# Patient Record
Sex: MALE | Race: WHITE | NOT HISPANIC OR LATINO | Employment: OTHER | URBAN - METROPOLITAN AREA
[De-identification: names, ages, dates, MRNs, and addresses within clinical notes are randomized per-mention and may not be internally consistent; named-entity substitution may affect disease eponyms.]

---

## 2018-07-12 ENCOUNTER — RECORDS - HEALTHEAST (OUTPATIENT)
Dept: LAB | Facility: CLINIC | Age: 73
End: 2018-07-12

## 2018-07-12 LAB
ALBUMIN SERPL-MCNC: 4 G/DL (ref 3.5–5)
ALP SERPL-CCNC: 62 U/L (ref 45–120)
ALT SERPL W P-5'-P-CCNC: 26 U/L (ref 0–45)
ANION GAP SERPL CALCULATED.3IONS-SCNC: 10 MMOL/L (ref 5–18)
AST SERPL W P-5'-P-CCNC: 20 U/L (ref 0–40)
BILIRUB SERPL-MCNC: 0.5 MG/DL (ref 0–1)
BUN SERPL-MCNC: 27 MG/DL (ref 8–28)
CALCIUM SERPL-MCNC: 9.2 MG/DL (ref 8.5–10.5)
CHLORIDE BLD-SCNC: 108 MMOL/L (ref 98–107)
CHOLEST SERPL-MCNC: 205 MG/DL
CO2 SERPL-SCNC: 24 MMOL/L (ref 22–31)
CREAT SERPL-MCNC: 0.85 MG/DL (ref 0.7–1.3)
FASTING STATUS PATIENT QL REPORTED: ABNORMAL
GFR SERPL CREATININE-BSD FRML MDRD: >60 ML/MIN/1.73M2
GLUCOSE BLD-MCNC: 94 MG/DL (ref 70–125)
HDLC SERPL-MCNC: 35 MG/DL
LDLC SERPL CALC-MCNC: 150 MG/DL
POTASSIUM BLD-SCNC: 4.3 MMOL/L (ref 3.5–5)
PROT SERPL-MCNC: 6.8 G/DL (ref 6–8)
PSA SERPL-MCNC: 0.7 NG/ML (ref 0–6.5)
SODIUM SERPL-SCNC: 142 MMOL/L (ref 136–145)
TRIGL SERPL-MCNC: 99 MG/DL

## 2018-07-20 ENCOUNTER — RECORDS - HEALTHEAST (OUTPATIENT)
Dept: ADMINISTRATIVE | Facility: OTHER | Age: 73
End: 2018-07-20

## 2018-07-25 ENCOUNTER — HOSPITAL ENCOUNTER (OUTPATIENT)
Dept: MRI IMAGING | Facility: CLINIC | Age: 73
Discharge: HOME | End: 2018-07-25
Attending: OPHTHALMOLOGY

## 2018-07-25 ENCOUNTER — COMMUNICATION - HEALTHEAST (OUTPATIENT)
Dept: TELEHEALTH | Facility: CLINIC | Age: 73
End: 2018-07-25

## 2018-07-25 DIAGNOSIS — H53.2 DIPLOPIA: ICD-10-CM

## 2020-01-03 ENCOUNTER — RECORDS - HEALTHEAST (OUTPATIENT)
Dept: LAB | Facility: CLINIC | Age: 75
End: 2020-01-03

## 2020-01-03 LAB
ANION GAP SERPL CALCULATED.3IONS-SCNC: 10 MMOL/L (ref 5–18)
BNP SERPL-MCNC: 33 PG/ML (ref 0–76)
BUN SERPL-MCNC: 18 MG/DL (ref 8–28)
CALCIUM SERPL-MCNC: 9.2 MG/DL (ref 8.5–10.5)
CHLORIDE BLD-SCNC: 104 MMOL/L (ref 98–107)
CO2 SERPL-SCNC: 26 MMOL/L (ref 22–31)
CREAT SERPL-MCNC: 0.85 MG/DL (ref 0.7–1.3)
GFR SERPL CREATININE-BSD FRML MDRD: >60 ML/MIN/1.73M2
GLUCOSE BLD-MCNC: 114 MG/DL (ref 70–125)
POTASSIUM BLD-SCNC: 3.6 MMOL/L (ref 3.5–5)
SODIUM SERPL-SCNC: 140 MMOL/L (ref 136–145)

## 2020-01-15 ENCOUNTER — AMBULATORY - HEALTHEAST (OUTPATIENT)
Dept: CARDIOLOGY | Facility: CLINIC | Age: 75
End: 2020-01-15

## 2020-01-15 ENCOUNTER — RECORDS - HEALTHEAST (OUTPATIENT)
Dept: ADMINISTRATIVE | Facility: OTHER | Age: 75
End: 2020-01-15

## 2020-01-15 ENCOUNTER — OFFICE VISIT - HEALTHEAST (OUTPATIENT)
Dept: CARDIOLOGY | Facility: CLINIC | Age: 75
End: 2020-01-15

## 2020-01-15 DIAGNOSIS — R60.9 EDEMA, UNSPECIFIED TYPE: ICD-10-CM

## 2020-01-15 DIAGNOSIS — I10 ESSENTIAL HYPERTENSION: ICD-10-CM

## 2020-01-15 DIAGNOSIS — I35.0 NONRHEUMATIC AORTIC VALVE STENOSIS: ICD-10-CM

## 2020-01-15 RX ORDER — LISINOPRIL 20 MG/1
2 TABLET ORAL DAILY
Status: SHIPPED | COMMUNITY
Start: 2019-11-12 | End: 2022-02-10

## 2020-01-15 RX ORDER — IBUPROFEN 200 MG
600 TABLET ORAL EVERY 6 HOURS PRN
Status: SHIPPED | COMMUNITY
Start: 2020-01-15 | End: 2021-12-08

## 2020-01-15 ASSESSMENT — MIFFLIN-ST. JEOR: SCORE: 1745.51

## 2020-01-16 ENCOUNTER — AMBULATORY - HEALTHEAST (OUTPATIENT)
Dept: CARDIOLOGY | Facility: CLINIC | Age: 75
End: 2020-01-16

## 2020-01-27 ENCOUNTER — COMMUNICATION - HEALTHEAST (OUTPATIENT)
Dept: CARDIOLOGY | Facility: CLINIC | Age: 75
End: 2020-01-27

## 2020-06-25 ENCOUNTER — RECORDS - HEALTHEAST (OUTPATIENT)
Dept: LAB | Facility: CLINIC | Age: 75
End: 2020-06-25

## 2020-06-25 LAB
ALBUMIN SERPL-MCNC: 4.3 G/DL (ref 3.5–5)
ALP SERPL-CCNC: 67 U/L (ref 45–120)
ALT SERPL W P-5'-P-CCNC: 31 U/L (ref 0–45)
ANION GAP SERPL CALCULATED.3IONS-SCNC: 12 MMOL/L (ref 5–18)
AST SERPL W P-5'-P-CCNC: 22 U/L (ref 0–40)
BILIRUB SERPL-MCNC: 0.5 MG/DL (ref 0–1)
BUN SERPL-MCNC: 19 MG/DL (ref 8–28)
CALCIUM SERPL-MCNC: 9.4 MG/DL (ref 8.5–10.5)
CHLORIDE BLD-SCNC: 102 MMOL/L (ref 98–107)
CHOLEST SERPL-MCNC: 251 MG/DL
CO2 SERPL-SCNC: 25 MMOL/L (ref 22–31)
CREAT SERPL-MCNC: 0.97 MG/DL (ref 0.7–1.3)
FASTING STATUS PATIENT QL REPORTED: ABNORMAL
GFR SERPL CREATININE-BSD FRML MDRD: >60 ML/MIN/1.73M2
GLUCOSE BLD-MCNC: 87 MG/DL (ref 70–125)
HDLC SERPL-MCNC: 33 MG/DL
LDLC SERPL CALC-MCNC: 159 MG/DL
POTASSIUM BLD-SCNC: 4.2 MMOL/L (ref 3.5–5)
PROT SERPL-MCNC: 7.3 G/DL (ref 6–8)
PSA SERPL-MCNC: 0.8 NG/ML (ref 0–6.5)
SODIUM SERPL-SCNC: 139 MMOL/L (ref 136–145)
TRIGL SERPL-MCNC: 293 MG/DL

## 2020-08-28 ENCOUNTER — RECORDS - HEALTHEAST (OUTPATIENT)
Dept: LAB | Facility: CLINIC | Age: 75
End: 2020-08-28

## 2020-08-28 LAB — VIT B12 SERPL-MCNC: 372 PG/ML (ref 213–816)

## 2021-04-05 ENCOUNTER — RECORDS - HEALTHEAST (OUTPATIENT)
Dept: LAB | Facility: CLINIC | Age: 76
End: 2021-04-05

## 2021-04-05 LAB
ALBUMIN SERPL-MCNC: 3.9 G/DL (ref 3.5–5)
ALP SERPL-CCNC: 66 U/L (ref 45–120)
ALT SERPL W P-5'-P-CCNC: 30 U/L (ref 0–45)
ANION GAP SERPL CALCULATED.3IONS-SCNC: 11 MMOL/L (ref 5–18)
AST SERPL W P-5'-P-CCNC: 20 U/L (ref 0–40)
BILIRUB SERPL-MCNC: 0.4 MG/DL (ref 0–1)
BUN SERPL-MCNC: 19 MG/DL (ref 8–28)
CALCIUM SERPL-MCNC: 8.9 MG/DL (ref 8.5–10.5)
CHLORIDE BLD-SCNC: 104 MMOL/L (ref 98–107)
CHOLEST SERPL-MCNC: 202 MG/DL
CO2 SERPL-SCNC: 25 MMOL/L (ref 22–31)
CREAT SERPL-MCNC: 0.82 MG/DL (ref 0.7–1.3)
FASTING STATUS PATIENT QL REPORTED: NO
GFR SERPL CREATININE-BSD FRML MDRD: >60 ML/MIN/1.73M2
GLUCOSE BLD-MCNC: 91 MG/DL (ref 70–125)
HDLC SERPL-MCNC: 29 MG/DL
LDLC SERPL CALC-MCNC: 132 MG/DL
POTASSIUM BLD-SCNC: 4.5 MMOL/L (ref 3.5–5)
PROT SERPL-MCNC: 7 G/DL (ref 6–8)
SODIUM SERPL-SCNC: 140 MMOL/L (ref 136–145)
TRIGL SERPL-MCNC: 207 MG/DL

## 2021-05-02 ENCOUNTER — HEALTH MAINTENANCE LETTER (OUTPATIENT)
Age: 76
End: 2021-05-02

## 2021-06-02 ENCOUNTER — RECORDS - HEALTHEAST (OUTPATIENT)
Dept: ADMINISTRATIVE | Facility: CLINIC | Age: 76
End: 2021-06-02

## 2021-06-04 VITALS
HEART RATE: 60 BPM | RESPIRATION RATE: 16 BRPM | WEIGHT: 226 LBS | SYSTOLIC BLOOD PRESSURE: 146 MMHG | HEIGHT: 69 IN | BODY MASS INDEX: 33.47 KG/M2 | DIASTOLIC BLOOD PRESSURE: 76 MMHG

## 2021-06-05 NOTE — TELEPHONE ENCOUNTER
----- Message from Katie Elizabeth MD sent at 1/27/2020  7:41 AM CST -----  Teodoro Maldonado,  Reviewed echocardiogram report.  Suggest mild to perhaps mild-moderate aortic stenosis.  Will require continued intermittent monitoring.  Would simply have patient follow-up with myself in approximately 2 months in part to reassess lower extremity edema. Thanks!  ----- Message -----  From: Emily Magana  Sent: 1/16/2020   3:20 PM CST  To: Katie Elizabeth MD

## 2021-06-05 NOTE — TELEPHONE ENCOUNTER
Attempted calling patient - no answer and no VM. Will attempt at a later time.  Message sent to scheduling to call patient for 2 month f/u.

## 2021-06-10 ENCOUNTER — RECORDS - HEALTHEAST (OUTPATIENT)
Dept: LAB | Facility: CLINIC | Age: 76
End: 2021-06-10

## 2021-06-11 LAB — VZV IGG SER QL IA: POSITIVE

## 2021-06-28 NOTE — PROGRESS NOTES
Progress Notes by Katie Elizabeth MD at 1/15/2020  1:50 PM     Author: Katie Elizabeth MD Service: -- Author Type: Physician    Filed: 1/15/2020  2:12 PM Encounter Date: 1/15/2020 Status: Signed    : Katie Elizabeth MD (Physician)                   Thank you Dr. Flores for asking the [facility] team to participate in the care of your patient, Nick Zaldivar.     Impression and Plan     1.  Systolic murmur.  Systolic murmur suggest aortic stenosis.  This may be significant.  Specifically, murmur is mid to late peaking.  Carotid pulses do seem somewhat delayed.  He had an echocardiogram performed just yesterday at outside facility though results are not available.    Plan to obtain echocardiogram results from yesterday, 14 January 2020.    2.  Lower extremity edema.  This has improved with discontinuation of amlodipine as well as increase in furosemide.  He still has some residual lower extremity edema.  On interview, I get the sense that he could work better at limiting his sodium.  At this time, simply recommended that he continue with the current medical regimen as it now stands and focus on trying to maintain sodium diet of less than 2000 mg.  I also recommended some compressive stockings as well.    Ultimately, further recommendations pending echocardiographic findings.         History of Present Illness    Once again I would like to thank you again for asking me to participate in the care of your patient, Nick Zaldivar.  As you know, but to reiterate for my own records, Nick Zaldivar is a 74 y.o. male with who had noticed some increasing lower extremity edema.  He had followed up with primary provider and had discontinuation of amlodipine and increase in his furosemide.  This he states has helped over the last week after making this change.  Still with some lower extremity edema, however.  He denies chest pain or significant worsening of shortness of breath.  He  "has had a weight gain recently of approximately 12 pounds though this he states may not just be from his swelling.  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  His daughter gave him approximately 100 pounds of candy bars and M&Ms and similar candy items.  He states that he has had some indiscretion in this regard.    Further review of systems is otherwise negative/noncontributory (medical record and 13 point review of systems reviewed as well and pertinent positives noted).         Cardiac Diagnostics     Twelve-lead ECG (personally reviewed) 2020: Sinus rhythm with PACs.  First-degree block.  Heart rate in the 50s.         Physical Examination       /76 (Patient Site: Left Arm, Patient Position: Sitting, Cuff Size: Adult Regular)   Pulse 60   Resp 16   Ht 5' 9\" (1.753 m)   Wt (!) 226 lb (102.5 kg)   BMI 33.37 kg/m          Wt Readings from Last 3 Encounters:   01/15/20 (!) 226 lb (102.5 kg)       The patient is alert and oriented times three. Sclerae are anicteric. Mucosal membranes are moist. Jugular venous pressure is normal. No significant adenopathy/thyromegally appreciated. Lungs are clear with good expansion. On cardiovascular exam, the patient has a regular S1 and S2.  There is a 3/6 systolic murmur heard at right sternal border.  Murmur radiates toward the carotids.  Carotid pulses do seem somewhat delayed.  Abdomen is soft and non-tender. Extremities reveal no clubbing, cyanosis, or edema.       Family History/Social History/Risk Factors   Patient does not smoke.  Father had a history of CVA.  Mother had a history of coronary disease and had bypass surgery.  She  at age 89.  He is retired.  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  In his spare time he enjoys going to the [employer].       Medications  Allergies   Current Outpatient Medications   Medication Sig Dispense " Refill   ? aspirin 81 MG EC tablet Take 81 mg by mouth daily.     ? ibuprofen (ADVIL,MOTRIN) 200 MG tablet Take 600 mg by mouth every 6 (six) hours as needed for pain.     ? lisinopril (PRINIVIL,ZESTRIL) 20 MG tablet Take 2 tablets by mouth daily.       No current facility-administered medications for this visit.       No Known Allergies       Lab Results   Lab Results   Component Value Date     01/03/2020    K 3.6 01/03/2020     01/03/2020    CO2 26 01/03/2020    BUN 18 01/03/2020    CREATININE 0.85 01/03/2020    CALCIUM 9.2 01/03/2020     Lab Results   Component Value Date    CHOL 205 (H) 07/12/2018    TRIG 99 07/12/2018    HDL 35 (L) 07/12/2018    LDLCALC 150 (H) 07/12/2018     Lab Results   Component Value Date    BNP 33 01/03/2020           Medical History  Surgical History   Hypertension  Lower extremity edema    History of pilonidal cyst surgery

## 2021-09-20 ENCOUNTER — TRANSCRIBE ORDERS (OUTPATIENT)
Dept: OTHER | Age: 76
End: 2021-09-20

## 2021-09-20 DIAGNOSIS — I21.3 ST ELEVATION MYOCARDIAL INFARCTION (STEMI), UNSPECIFIED ARTERY (H): Primary | ICD-10-CM

## 2021-09-22 ENCOUNTER — TRANSCRIBE ORDERS (OUTPATIENT)
Dept: OTHER | Age: 76
End: 2021-09-22

## 2021-10-04 ENCOUNTER — TRANSCRIBE ORDERS (OUTPATIENT)
Dept: OTHER | Age: 76
End: 2021-10-04

## 2021-10-05 ENCOUNTER — HOSPITAL ENCOUNTER (OUTPATIENT)
Dept: CARDIAC REHAB | Facility: CLINIC | Age: 76
End: 2021-10-05
Attending: INTERNAL MEDICINE
Payer: MEDICARE

## 2021-10-05 PROCEDURE — 93797 PHYS/QHP OP CAR RHAB WO ECG: CPT

## 2021-10-05 PROCEDURE — 93798 PHYS/QHP OP CAR RHAB W/ECG: CPT

## 2021-10-08 ENCOUNTER — HOSPITAL ENCOUNTER (OUTPATIENT)
Dept: CARDIAC REHAB | Facility: CLINIC | Age: 76
End: 2021-10-08
Attending: INTERNAL MEDICINE
Payer: MEDICARE

## 2021-10-08 PROCEDURE — 93798 PHYS/QHP OP CAR RHAB W/ECG: CPT

## 2021-10-12 ENCOUNTER — HOSPITAL ENCOUNTER (OUTPATIENT)
Dept: CARDIAC REHAB | Facility: CLINIC | Age: 76
End: 2021-10-12
Attending: INTERNAL MEDICINE
Payer: MEDICARE

## 2021-10-12 PROCEDURE — 93798 PHYS/QHP OP CAR RHAB W/ECG: CPT

## 2021-10-15 ENCOUNTER — HOSPITAL ENCOUNTER (OUTPATIENT)
Dept: CARDIAC REHAB | Facility: CLINIC | Age: 76
End: 2021-10-15
Attending: INTERNAL MEDICINE
Payer: MEDICARE

## 2021-10-15 PROCEDURE — 93798 PHYS/QHP OP CAR RHAB W/ECG: CPT

## 2021-10-19 ENCOUNTER — HOSPITAL ENCOUNTER (OUTPATIENT)
Dept: CARDIAC REHAB | Facility: CLINIC | Age: 76
End: 2021-10-19
Attending: INTERNAL MEDICINE
Payer: MEDICARE

## 2021-10-19 PROCEDURE — 93798 PHYS/QHP OP CAR RHAB W/ECG: CPT | Performed by: OCCUPATIONAL THERAPIST

## 2021-10-22 ENCOUNTER — HOSPITAL ENCOUNTER (OUTPATIENT)
Dept: CARDIAC REHAB | Facility: CLINIC | Age: 76
End: 2021-10-22
Attending: INTERNAL MEDICINE
Payer: MEDICARE

## 2021-10-22 PROCEDURE — 93798 PHYS/QHP OP CAR RHAB W/ECG: CPT

## 2021-10-26 ENCOUNTER — HOSPITAL ENCOUNTER (OUTPATIENT)
Dept: CARDIAC REHAB | Facility: CLINIC | Age: 76
End: 2021-10-26
Attending: INTERNAL MEDICINE
Payer: MEDICARE

## 2021-10-26 PROCEDURE — 93798 PHYS/QHP OP CAR RHAB W/ECG: CPT

## 2021-10-29 ENCOUNTER — HOSPITAL ENCOUNTER (OUTPATIENT)
Dept: CARDIAC REHAB | Facility: CLINIC | Age: 76
End: 2021-10-29
Attending: INTERNAL MEDICINE
Payer: MEDICARE

## 2021-10-29 PROCEDURE — 93798 PHYS/QHP OP CAR RHAB W/ECG: CPT

## 2021-11-05 ENCOUNTER — HOSPITAL ENCOUNTER (OUTPATIENT)
Dept: CARDIAC REHAB | Facility: CLINIC | Age: 76
End: 2021-11-05
Attending: INTERNAL MEDICINE
Payer: MEDICARE

## 2021-11-05 PROCEDURE — 93798 PHYS/QHP OP CAR RHAB W/ECG: CPT

## 2021-11-09 ENCOUNTER — HOSPITAL ENCOUNTER (OUTPATIENT)
Dept: CARDIAC REHAB | Facility: CLINIC | Age: 76
End: 2021-11-09
Attending: INTERNAL MEDICINE
Payer: MEDICARE

## 2021-11-09 PROCEDURE — 93798 PHYS/QHP OP CAR RHAB W/ECG: CPT

## 2021-11-12 ENCOUNTER — HOSPITAL ENCOUNTER (OUTPATIENT)
Dept: CARDIAC REHAB | Facility: CLINIC | Age: 76
End: 2021-11-12
Attending: INTERNAL MEDICINE
Payer: MEDICARE

## 2021-11-12 PROCEDURE — 93798 PHYS/QHP OP CAR RHAB W/ECG: CPT

## 2021-11-16 ENCOUNTER — HOSPITAL ENCOUNTER (OUTPATIENT)
Dept: CARDIAC REHAB | Facility: CLINIC | Age: 76
End: 2021-11-16
Attending: INTERNAL MEDICINE
Payer: MEDICARE

## 2021-11-16 PROCEDURE — 93798 PHYS/QHP OP CAR RHAB W/ECG: CPT

## 2021-11-19 ENCOUNTER — HOSPITAL ENCOUNTER (OUTPATIENT)
Dept: CARDIAC REHAB | Facility: CLINIC | Age: 76
End: 2021-11-19
Attending: INTERNAL MEDICINE
Payer: MEDICARE

## 2021-11-19 PROCEDURE — 93798 PHYS/QHP OP CAR RHAB W/ECG: CPT

## 2021-11-23 ENCOUNTER — HOSPITAL ENCOUNTER (OUTPATIENT)
Dept: CARDIAC REHAB | Facility: CLINIC | Age: 76
End: 2021-11-23
Attending: INTERNAL MEDICINE
Payer: MEDICARE

## 2021-11-23 PROCEDURE — 93798 PHYS/QHP OP CAR RHAB W/ECG: CPT

## 2021-11-26 ENCOUNTER — HOSPITAL ENCOUNTER (OUTPATIENT)
Dept: CARDIAC REHAB | Facility: CLINIC | Age: 76
End: 2021-11-26
Attending: INTERNAL MEDICINE
Payer: MEDICARE

## 2021-11-26 PROCEDURE — 93798 PHYS/QHP OP CAR RHAB W/ECG: CPT

## 2021-11-30 ENCOUNTER — HOSPITAL ENCOUNTER (OUTPATIENT)
Dept: CARDIAC REHAB | Facility: CLINIC | Age: 76
End: 2021-11-30
Attending: INTERNAL MEDICINE
Payer: MEDICARE

## 2021-11-30 PROCEDURE — 93798 PHYS/QHP OP CAR RHAB W/ECG: CPT

## 2021-12-03 ENCOUNTER — HOSPITAL ENCOUNTER (OUTPATIENT)
Dept: CARDIAC REHAB | Facility: CLINIC | Age: 76
End: 2021-12-03
Attending: INTERNAL MEDICINE
Payer: MEDICARE

## 2021-12-03 PROCEDURE — 93798 PHYS/QHP OP CAR RHAB W/ECG: CPT

## 2021-12-07 ENCOUNTER — HOSPITAL ENCOUNTER (OUTPATIENT)
Dept: CARDIAC REHAB | Facility: CLINIC | Age: 76
End: 2021-12-07
Attending: INTERNAL MEDICINE
Payer: MEDICARE

## 2021-12-07 PROCEDURE — 93798 PHYS/QHP OP CAR RHAB W/ECG: CPT

## 2021-12-08 ENCOUNTER — OFFICE VISIT (OUTPATIENT)
Dept: CARDIOLOGY | Facility: CLINIC | Age: 76
End: 2021-12-08
Payer: MEDICARE

## 2021-12-08 VITALS
RESPIRATION RATE: 24 BRPM | HEART RATE: 59 BPM | SYSTOLIC BLOOD PRESSURE: 110 MMHG | BODY MASS INDEX: 32.84 KG/M2 | WEIGHT: 221.7 LBS | DIASTOLIC BLOOD PRESSURE: 60 MMHG | HEIGHT: 69 IN

## 2021-12-08 DIAGNOSIS — I25.83 CORONARY ARTERY DISEASE DUE TO LIPID RICH PLAQUE: Primary | ICD-10-CM

## 2021-12-08 DIAGNOSIS — I25.10 CORONARY ARTERY DISEASE DUE TO LIPID RICH PLAQUE: Primary | ICD-10-CM

## 2021-12-08 DIAGNOSIS — I10 PRIMARY HYPERTENSION: ICD-10-CM

## 2021-12-08 DIAGNOSIS — E66.813 CLASS 3 SEVERE OBESITY DUE TO EXCESS CALORIES WITHOUT SERIOUS COMORBIDITY IN ADULT, UNSPECIFIED BMI (H): ICD-10-CM

## 2021-12-08 DIAGNOSIS — I06.0 RHEUMATIC AORTIC STENOSIS: ICD-10-CM

## 2021-12-08 DIAGNOSIS — E78.00 PURE HYPERCHOLESTEROLEMIA: ICD-10-CM

## 2021-12-08 DIAGNOSIS — E66.01 CLASS 3 SEVERE OBESITY DUE TO EXCESS CALORIES WITHOUT SERIOUS COMORBIDITY IN ADULT, UNSPECIFIED BMI (H): ICD-10-CM

## 2021-12-08 PROBLEM — E66.9 OBESITY: Status: ACTIVE | Noted: 2021-12-08

## 2021-12-08 PROCEDURE — 99204 OFFICE O/P NEW MOD 45 MIN: CPT | Performed by: INTERNAL MEDICINE

## 2021-12-08 RX ORDER — ACETAMINOPHEN 325 MG/1
650 TABLET ORAL PRN
Status: ON HOLD | COMMUNITY
Start: 2020-04-23 | End: 2024-01-22

## 2021-12-08 RX ORDER — EPINEPHRINE 0.3 MG/.3ML
0.3 INJECTION SUBCUTANEOUS PRN
COMMUNITY
Start: 2020-04-23 | End: 2022-02-10

## 2021-12-08 RX ORDER — ROSUVASTATIN CALCIUM 40 MG/1
1 TABLET, COATED ORAL DAILY
COMMUNITY
Start: 2021-09-18 | End: 2021-12-09

## 2021-12-08 RX ORDER — FUROSEMIDE 40 MG
40 TABLET ORAL DAILY
Status: ON HOLD | COMMUNITY
Start: 2018-11-01 | End: 2024-01-22

## 2021-12-08 RX ORDER — METOPROLOL TARTRATE 25 MG/1
25 TABLET, FILM COATED ORAL DAILY
COMMUNITY
Start: 2021-09-18 | End: 2021-12-09

## 2021-12-08 RX ORDER — NITROGLYCERIN 0.4 MG/1
TABLET SUBLINGUAL SEE ADMIN INSTRUCTIONS
COMMUNITY
Start: 2021-09-18

## 2021-12-08 ASSESSMENT — MIFFLIN-ST. JEOR: SCORE: 1726

## 2021-12-08 NOTE — PROGRESS NOTES
[facility] Heart Care Office Consult     Assessment:   (I25.10,  I25.83) Coronary artery disease due to lipid rich plaque  (primary encounter diagnosis)  Comment: Angiography September 2021 due to ST segment elevation inferior MI showed normal left main, 50% mid LAD, 50% mid circumflex, and 95% distal right coronary artery involving the PDA posterior lateral branches.  He received drug-coated stent.  Getting along well without symptoms and work on prevention.    (I10) Primary hypertension  Comment: Under good control on lisinopril and metoprolol.    (E78.00) Pure hypercholesterolemia  Comment: Total cholesterol 112 with an LDL 52 which is excellent on rosuvastatin.    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  We will check echo in March 2022, 6 months out from his echo at [facility].    (E66.01) Class 3 severe obesity due to excess calories without serious comorbidity in adult, unspecified BMI (H)  Comment: Work on weight loss.     Plan:   1.  Try to go to furosemide every other day only as needed.  2.  Increase activity at cardiac rehab to about 5 to 7 minutes.  3.  Work on weight loss.  4.  Follow-up echo for aortic stenosis in March 2022.  5.  Follow-up with me in about September 2022, a year out from his heart attack.    History of Present Illness:   Thank you for asking the [facility] Heart Care team to see Nick Zaldivar a 75 year old male  in consultation  to evaluate coronary artery disease.   Patient was in his usual state of health till around September 2021 when he presented to [facility] with chest discomfort.  He was noted have ST segment elevation involving his inferior leads and underwent subsequent angiography finding out 95% lesion involving the PDA posterior lateral branch of the right coronary artery.  He received a drug-coated stents at that time.  He is doing cardiac rehab here at [facility],  lives independently with his wife, walking on the treadmill and the basement at home.  He complains of back discomfort, as well as bilateral foot neuropathy and otherwise no syncope, dizziness, fatigue, chest discomfort, palpitations, significant shortness of breath, PND, orthopnea.    Past Medical History:   Benign essential hypertension  Pure hypercholesterolemia  Coronary artery disease  Lumbar spondylosis  Peripheral neuropathy  Rheumatic aortic stenosis    Past history is negative for cancer, tuberculosis, diabetes mellitus, cerebrovascular accident, chronic kidney disease, peptic ulcer disease, chronic obstructive pulmonary disease, or thyroid disorder.    Past Surgical History:   No past surgical history on file.    Family History:   Family history positive for coronary artery bypass grafting in his mother in her 80s with pacemaker implant.    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 He has never used smokeless tobacco. He reports that he does not use drugs.  He drinks maybe 1 [employer] cream every other month. The primary care physician is Randy Rodriguez.    Meds:   Scheduled Meds:  Current Outpatient Medications   Medication Sig Dispense Refill     acetaminophen (TYLENOL) 325 MG tablet Take 650 mg by mouth as needed       aspirin 81 MG EC tablet [ASPIRIN 81 MG EC TABLET] Take 81 mg by mouth daily.       EPINEPHrine (ANY BX GENERIC EQUIV) 0.3 MG/0.3ML injection 2-pack Inject 0.3 mg into the muscle as needed       furosemide (LASIX) 40 MG tablet Take 40 mg by mouth daily       lisinopril (PRINIVIL,ZESTRIL) 20 MG tablet [LISINOPRIL (PRINIVIL,ZESTRIL) 20 MG TABLET] Take 2 tablets by mouth daily.       metoprolol tartrate (LOPRESSOR) 25 MG tablet Take 25 mg by mouth daily       nitroGLYcerin (NITROSTAT) 0.4 MG sublingual tablet See Admin Instructions        "rosuvastatin (CRESTOR) 40 MG tablet Take 1 tablet by mouth daily       ticagrelor (BRILINTA) 90 MG tablet Take 90 mg by mouth 2 times daily         PRN Meds:.    Allergies:   Patient has no known allergies.    Objective:      Physical Exam  100.6 kg (221 lb 11.2 oz)  1.753 m (5' 9\")  Body mass index is 32.74 kg/m .  /60 (BP Location: Left arm, Patient Position: Sitting, Cuff Size: Adult Large)   Pulse 59   Resp 24   Ht 1.753 m (5' 9\")   Wt 100.6 kg (221 lb 11.2 oz)   BMI 32.74 kg/m      General Appearance:   Alert, cooperative and in no acute distress.   HEENT:  No scleral icterus; the mucous membranes were pink and moist.   Neck: JVP normal. No thyromegaly. No HJR   Chest: The spine was straight. The chest was symmetric.   Lungs:   Respirations unlabored; the lungs are clear to auscultation.   Cardiovascular:   S1 and S2 with 2/6 systolic ejection murmur, clicks or rubs. Brachial, radial, carotid and posterior tibial pulses are intact and symetrical.  No carotid bruits noted   Abdomen:  No organomegaly, masses, bruits, or tenderness. Bowels sounds are present   Extremities: No cyanosis, clubbing, or edema.   Skin: No xanthelasma.   Neurologic: Mood and affect are appropriate.         Lab Reviewed Personally by myself  Lab Results   Component Value Date     04/05/2021    CO2 25 04/05/2021    BUN 19 04/05/2021     Lab Results   Component Value Date    WBC 6.1 03/26/2020    HGB 14.7 03/26/2020    HCT 43.4 03/26/2020    MCV 90 03/26/2020     03/26/2020     Lab Results   Component Value Date    CHOL 202 04/05/2021    TRIG 207 04/05/2021    HDL 29 04/05/2021     Lab Results   Component Value Date    BNP 33 01/03/2020       ECG personally reviewed by myself shows sinus rhythm, within normal limits.     Review of Systems:     Review of Systems:   Enc Vitals  BP: 110/60  Pulse: 59  Resp: 24  Weight: 100.6 kg (221 lb 11.2 oz)  Height: 175.3 cm (5' 9\")                                          "

## 2021-12-08 NOTE — PATIENT INSTRUCTIONS
Mr. Nick Zaldivar,  It certainly was nice to meet you today.  Per our conversation you had a heart attack and got a stent and still have a heart valve issue. This is the reason I am checking the ultrasound of the heart around March.  We will call you the results of these tests.  In the interim continue the cardiac rehab and show them this letter to increase to 5-7 mets as tolerated. Consider the diuretic every other day or only when getting puffy legs.  I will plan on seeing you a year out from the heart attack.  Jas Hope

## 2021-12-08 NOTE — LETTER
12/8/2021    Randy Rodriguez MD  [facility] [address]  [address]  [place] [id number]    RE: Nick Zaldivar       Dear Colleague,     I had the pleasure of seeing Nick Zaldivar in the [facility].    [facility] Care Office Consult     Assessment:   (I25.10,  I25.83) Coronary artery disease due to lipid rich plaque  (primary encounter diagnosis)  Comment: Angiography September 2021 due to ST segment elevation inferior MI showed normal left main, 50% mid LAD, 50% mid circumflex, and 95% distal right coronary artery involving the PDA posterior lateral branches.  He received drug-coated stent.  Getting along well without symptoms and work on prevention.    (I10) Primary hypertension  Comment: Under good control on lisinopril and metoprolol.    (E78.00) Pure hypercholesterolemia  Comment: Total cholesterol 112 with an LDL 52 which is excellent on rosuvastatin.    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  We will check echo in March 2022, 6 months out from his echo at [facility].    (E66.01) Class 3 severe obesity due to excess calories without serious comorbidity in adult, unspecified BMI (H)  Comment: Work on weight loss.     Plan:   1.  Try to go to furosemide every other day only as needed.  2.  Increase activity at cardiac rehab to about 5 to 7 minutes.  3.  Work on weight loss.  4.  Follow-up echo for aortic stenosis in March 2022.  5.  Follow-up with me in about September 2022, a year out from his heart attack.    History of Present Illness:   Thank you for asking the [facility] Care team to see Nick Zaldivar a 75 year old male  in consultation  to evaluate coronary artery disease.   Patient was in his usual state of health till around September 2021 when he presented to [facility] with chest discomfort.  He was noted have ST segment elevation involving his inferior  leads and underwent subsequent angiography finding out 95% lesion involving the PDA posterior lateral branch of the right coronary artery.  He received a drug-coated stents at that time.  He is doing cardiac rehab here at [facility], lives independently with his wife, walking on the treadmill and the basement at home.  He complains of back discomfort, as well as bilateral foot neuropathy and otherwise no syncope, dizziness, fatigue, chest discomfort, palpitations, significant shortness of breath, PND, orthopnea.    Past Medical History:   Benign essential hypertension  Pure hypercholesterolemia  Coronary artery disease  Lumbar spondylosis  Peripheral neuropathy  Rheumatic aortic stenosis    Past history is negative for cancer, tuberculosis, diabetes mellitus, cerebrovascular accident, chronic kidney disease, peptic ulcer disease, chronic obstructive pulmonary disease, or thyroid disorder.    Past Surgical History:   No past surgical history on file.    Family History:   Family history positive for coronary artery bypass grafting in his mother in her 80s with pacemaker implant.    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 He has never used smokeless tobacco. He reports that he does not use drugs.  He drinks maybe 1 [employer] cream every other month. The primary care physician is Randy Rodriguez    Meds:   Scheduled Meds:  Current Outpatient Medications   Medication Sig Dispense Refill     acetaminophen (TYLENOL) 325 MG tablet Take 650 mg by mouth as needed       aspirin 81 MG EC tablet [ASPIRIN 81 MG EC TABLET] Take 81 mg by mouth daily.       EPINEPHrine (ANY BX GENERIC EQUIV) 0.3 MG/0.3ML injection 2-pack Inject 0.3 mg into the muscle as needed       furosemide (LASIX) 40 MG tablet Take 40 mg by mouth daily       lisinopril (PRINIVIL,ZESTRIL) 20 MG tablet  "[LISINOPRIL (PRINIVIL,ZESTRIL) 20 MG TABLET] Take 2 tablets by mouth daily.       metoprolol tartrate (LOPRESSOR) 25 MG tablet Take 25 mg by mouth daily       nitroGLYcerin (NITROSTAT) 0.4 MG sublingual tablet See Admin Instructions       rosuvastatin (CRESTOR) 40 MG tablet Take 1 tablet by mouth daily       ticagrelor (BRILINTA) 90 MG tablet Take 90 mg by mouth 2 times daily         PRN Meds:.    Allergies:   Patient has no known allergies.    Objective:      Physical Exam  100.6 kg (221 lb 11.2 oz)  1.753 m (5' 9\")  Body mass index is 32.74 kg/m .  /60 (BP Location: Left arm, Patient Position: Sitting, Cuff Size: Adult Large)   Pulse 59   Resp 24   Ht 1.753 m (5' 9\")   Wt 100.6 kg (221 lb 11.2 oz)   BMI 32.74 kg/m      General Appearance:   Alert, cooperative and in no acute distress.   HEENT:  No scleral icterus; the mucous membranes were pink and moist.   Neck: JVP normal. No thyromegaly. No HJR   Chest: The spine was straight. The chest was symmetric.   Lungs:   Respirations unlabored; the lungs are clear to auscultation.   Cardiovascular:   S1 and S2 with 2/6 systolic ejection murmur, clicks or rubs. Brachial, radial, carotid and posterior tibial pulses are intact and symetrical.  No carotid bruits noted   Abdomen:  No organomegaly, masses, bruits, or tenderness. Bowels sounds are present   Extremities: No cyanosis, clubbing, or edema.   Skin: No xanthelasma.   Neurologic: Mood and affect are appropriate.         Lab Reviewed Personally by myself  Lab Results   Component Value Date     04/05/2021    CO2 25 04/05/2021    BUN 19 04/05/2021     Lab Results   Component Value Date    WBC 6.1 03/26/2020    HGB 14.7 03/26/2020    HCT 43.4 03/26/2020    MCV 90 03/26/2020     03/26/2020     Lab Results   Component Value Date    CHOL 202 04/05/2021    TRIG 207 04/05/2021    HDL 29 04/05/2021     Lab Results   Component Value Date    BNP 33 01/03/2020       ECG personally reviewed by myself shows " "sinus rhythm, within normal limits.     Review of Systems:     Review of Systems:   Enc Vitals  BP: 110/60  Pulse: 59  Resp: 24  Weight: 100.6 kg (221 lb 11.2 oz)  Height: 175.3 cm (5' 9\")              "

## 2021-12-09 ENCOUNTER — MYC MEDICAL ADVICE (OUTPATIENT)
Dept: CARDIOLOGY | Facility: CLINIC | Age: 76
End: 2021-12-09
Payer: MEDICARE

## 2021-12-09 DIAGNOSIS — I25.83 CORONARY ARTERY DISEASE DUE TO LIPID RICH PLAQUE: ICD-10-CM

## 2021-12-09 DIAGNOSIS — I10 ESSENTIAL HYPERTENSION: ICD-10-CM

## 2021-12-09 DIAGNOSIS — E78.00 PURE HYPERCHOLESTEROLEMIA: Primary | ICD-10-CM

## 2021-12-09 DIAGNOSIS — I25.10 CORONARY ARTERY DISEASE DUE TO LIPID RICH PLAQUE: ICD-10-CM

## 2021-12-09 RX ORDER — ROSUVASTATIN CALCIUM 40 MG/1
40 TABLET, COATED ORAL DAILY
Qty: 90 TABLET | Refills: 3 | Status: SHIPPED | OUTPATIENT
Start: 2021-12-09 | End: 2022-12-08

## 2021-12-09 RX ORDER — METOPROLOL TARTRATE 25 MG/1
25 TABLET, FILM COATED ORAL 2 TIMES DAILY
Qty: 180 TABLET | Refills: 3 | Status: SHIPPED | OUTPATIENT
Start: 2021-12-09 | End: 2022-07-29

## 2021-12-09 NOTE — TELEPHONE ENCOUNTER
Called patient and discussed mychart request. He confirmed that his Metoprolol 25 mg is BID not daily; LBF discussed medications during visit yesterday. Pt is switching care to him. Rx's refilled to his mail order pharmacy and he was updated. He thanks BRIAN. -[facility] [place]

## 2021-12-10 ENCOUNTER — HOSPITAL ENCOUNTER (OUTPATIENT)
Dept: CARDIAC REHAB | Facility: CLINIC | Age: 76
End: 2021-12-10
Attending: INTERNAL MEDICINE
Payer: MEDICARE

## 2021-12-10 PROCEDURE — 93798 PHYS/QHP OP CAR RHAB W/ECG: CPT

## 2021-12-17 ENCOUNTER — HOSPITAL ENCOUNTER (OUTPATIENT)
Dept: CARDIAC REHAB | Facility: CLINIC | Age: 76
End: 2021-12-17
Attending: INTERNAL MEDICINE
Payer: MEDICARE

## 2021-12-17 PROCEDURE — 93798 PHYS/QHP OP CAR RHAB W/ECG: CPT

## 2021-12-21 ENCOUNTER — HOSPITAL ENCOUNTER (OUTPATIENT)
Dept: CARDIAC REHAB | Facility: CLINIC | Age: 76
End: 2021-12-21
Attending: INTERNAL MEDICINE
Payer: MEDICARE

## 2021-12-21 PROCEDURE — 93798 PHYS/QHP OP CAR RHAB W/ECG: CPT

## 2021-12-28 ENCOUNTER — HOSPITAL ENCOUNTER (OUTPATIENT)
Dept: CARDIAC REHAB | Facility: CLINIC | Age: 76
End: 2021-12-28
Attending: INTERNAL MEDICINE
Payer: MEDICARE

## 2021-12-28 PROCEDURE — 93798 PHYS/QHP OP CAR RHAB W/ECG: CPT

## 2021-12-31 ENCOUNTER — HOSPITAL ENCOUNTER (OUTPATIENT)
Dept: CARDIAC REHAB | Facility: CLINIC | Age: 76
End: 2021-12-31
Attending: INTERNAL MEDICINE
Payer: MEDICARE

## 2021-12-31 PROCEDURE — 93798 PHYS/QHP OP CAR RHAB W/ECG: CPT

## 2022-01-04 ENCOUNTER — HOSPITAL ENCOUNTER (OUTPATIENT)
Dept: CARDIAC REHAB | Facility: CLINIC | Age: 77
End: 2022-01-04
Attending: INTERNAL MEDICINE
Payer: MEDICARE

## 2022-01-04 PROCEDURE — 93798 PHYS/QHP OP CAR RHAB W/ECG: CPT

## 2022-01-07 ENCOUNTER — HOSPITAL ENCOUNTER (OUTPATIENT)
Dept: CARDIAC REHAB | Facility: CLINIC | Age: 77
End: 2022-01-07
Attending: INTERNAL MEDICINE
Payer: MEDICARE

## 2022-01-07 PROCEDURE — 93798 PHYS/QHP OP CAR RHAB W/ECG: CPT

## 2022-01-11 ENCOUNTER — HOSPITAL ENCOUNTER (OUTPATIENT)
Dept: CARDIAC REHAB | Facility: CLINIC | Age: 77
End: 2022-01-11
Attending: INTERNAL MEDICINE
Payer: MEDICARE

## 2022-01-11 PROCEDURE — 93798 PHYS/QHP OP CAR RHAB W/ECG: CPT

## 2022-01-14 ENCOUNTER — HOSPITAL ENCOUNTER (OUTPATIENT)
Dept: CARDIAC REHAB | Facility: CLINIC | Age: 77
End: 2022-01-14
Attending: INTERNAL MEDICINE
Payer: MEDICARE

## 2022-01-14 PROCEDURE — 93798 PHYS/QHP OP CAR RHAB W/ECG: CPT

## 2022-01-18 ENCOUNTER — HOSPITAL ENCOUNTER (OUTPATIENT)
Dept: CARDIAC REHAB | Facility: CLINIC | Age: 77
End: 2022-01-18
Attending: INTERNAL MEDICINE
Payer: MEDICARE

## 2022-01-18 PROCEDURE — 93798 PHYS/QHP OP CAR RHAB W/ECG: CPT

## 2022-01-21 ENCOUNTER — HOSPITAL ENCOUNTER (OUTPATIENT)
Dept: CARDIAC REHAB | Facility: CLINIC | Age: 77
End: 2022-01-21
Attending: INTERNAL MEDICINE
Payer: MEDICARE

## 2022-01-21 PROCEDURE — 93798 PHYS/QHP OP CAR RHAB W/ECG: CPT

## 2022-01-21 PROCEDURE — 93797 PHYS/QHP OP CAR RHAB WO ECG: CPT | Mod: 59

## 2022-02-04 ENCOUNTER — LAB REQUISITION (OUTPATIENT)
Dept: LAB | Facility: CLINIC | Age: 77
End: 2022-02-04
Payer: MEDICARE

## 2022-02-04 DIAGNOSIS — R42 DIZZINESS AND GIDDINESS: ICD-10-CM

## 2022-02-04 LAB
ANION GAP SERPL CALCULATED.3IONS-SCNC: 13 MMOL/L (ref 5–18)
BUN SERPL-MCNC: 19 MG/DL (ref 8–28)
CALCIUM SERPL-MCNC: 9.6 MG/DL (ref 8.5–10.5)
CHLORIDE BLD-SCNC: 102 MMOL/L (ref 98–107)
CO2 SERPL-SCNC: 23 MMOL/L (ref 22–31)
CREAT SERPL-MCNC: 1.29 MG/DL (ref 0.7–1.3)
GFR SERPL CREATININE-BSD FRML MDRD: 57 ML/MIN/1.73M2
GLUCOSE BLD-MCNC: 87 MG/DL (ref 70–125)
POTASSIUM BLD-SCNC: 5.1 MMOL/L (ref 3.5–5)
SODIUM SERPL-SCNC: 138 MMOL/L (ref 136–145)
TSH SERPL DL<=0.005 MIU/L-ACNC: 0.99 UIU/ML (ref 0.3–5)

## 2022-02-04 PROCEDURE — 80048 BASIC METABOLIC PNL TOTAL CA: CPT | Mod: ORL | Performed by: FAMILY MEDICINE

## 2022-02-04 PROCEDURE — 84443 ASSAY THYROID STIM HORMONE: CPT | Mod: ORL | Performed by: FAMILY MEDICINE

## 2022-02-08 ENCOUNTER — HOSPITAL ENCOUNTER (OUTPATIENT)
Dept: CARDIOLOGY | Facility: CLINIC | Age: 77
Discharge: HOME | End: 2022-02-08
Attending: INTERNAL MEDICINE | Admitting: INTERNAL MEDICINE
Payer: MEDICARE

## 2022-02-08 DIAGNOSIS — I06.0 RHEUMATIC AORTIC STENOSIS: ICD-10-CM

## 2022-02-08 LAB — LVEF ECHO: NORMAL

## 2022-02-08 PROCEDURE — 255N000002 HC RX 255 OP 636: Performed by: INTERNAL MEDICINE

## 2022-02-08 PROCEDURE — 93306 TTE W/DOPPLER COMPLETE: CPT | Mod: 26 | Performed by: INTERNAL MEDICINE

## 2022-02-08 RX ADMIN — PERFLUTREN 2 ML: 6.52 INJECTION, SUSPENSION INTRAVENOUS at 13:43

## 2022-02-10 ENCOUNTER — OFFICE VISIT (OUTPATIENT)
Dept: CARDIOLOGY | Facility: CLINIC | Age: 77
End: 2022-02-10
Payer: MEDICARE

## 2022-02-10 VITALS
HEIGHT: 70 IN | SYSTOLIC BLOOD PRESSURE: 120 MMHG | WEIGHT: 213.8 LBS | HEART RATE: 56 BPM | BODY MASS INDEX: 30.61 KG/M2 | DIASTOLIC BLOOD PRESSURE: 60 MMHG | RESPIRATION RATE: 16 BRPM

## 2022-02-10 DIAGNOSIS — I06.0 RHEUMATIC AORTIC STENOSIS: ICD-10-CM

## 2022-02-10 DIAGNOSIS — I25.10 CORONARY ARTERY DISEASE DUE TO LIPID RICH PLAQUE: Primary | ICD-10-CM

## 2022-02-10 DIAGNOSIS — E66.813 CLASS 3 SEVERE OBESITY DUE TO EXCESS CALORIES WITHOUT SERIOUS COMORBIDITY IN ADULT, UNSPECIFIED BMI (H): ICD-10-CM

## 2022-02-10 DIAGNOSIS — I10 PRIMARY HYPERTENSION: ICD-10-CM

## 2022-02-10 DIAGNOSIS — E87.5 HYPERKALEMIA: ICD-10-CM

## 2022-02-10 DIAGNOSIS — I25.83 CORONARY ARTERY DISEASE DUE TO LIPID RICH PLAQUE: Primary | ICD-10-CM

## 2022-02-10 DIAGNOSIS — H81.13 BENIGN PAROXYSMAL POSITIONAL VERTIGO DUE TO BILATERAL VESTIBULAR DISORDER: ICD-10-CM

## 2022-02-10 DIAGNOSIS — N18.31 STAGE 3A CHRONIC KIDNEY DISEASE (H): ICD-10-CM

## 2022-02-10 DIAGNOSIS — E66.01 CLASS 3 SEVERE OBESITY DUE TO EXCESS CALORIES WITHOUT SERIOUS COMORBIDITY IN ADULT, UNSPECIFIED BMI (H): ICD-10-CM

## 2022-02-10 DIAGNOSIS — E78.00 PURE HYPERCHOLESTEROLEMIA: ICD-10-CM

## 2022-02-10 PROCEDURE — 99214 OFFICE O/P EST MOD 30 MIN: CPT | Performed by: INTERNAL MEDICINE

## 2022-02-10 RX ORDER — LISINOPRIL 40 MG/1
40 TABLET ORAL DAILY
Status: ON HOLD | COMMUNITY
Start: 2021-12-23 | End: 2024-02-22

## 2022-02-10 ASSESSMENT — MIFFLIN-ST. JEOR: SCORE: 1701.04

## 2022-02-10 NOTE — LETTER
2/10/2022    Shane Duncan MD  [place] [address]  [place] [address]    RE: Nick Zaldivar       Dear Colleague,     I had the pleasure of seeing Nick Zaldivar in the [facility].      [facility]  Heart Care Clinic Follow-up Note    Assessment & Plan        (I25.10,  I25.83) Coronary artery disease due to lipid rich plaque  (primary encounter diagnosis)  Comment: Angiography September 2021 due to ST segment elevation inferior MI showed normal left main, 50% mid LAD, 50% mid circumflex, and 95% distal right coronary artery involving the PDA and posterior lateral branches. He received drug-coated stent.  Getting along well without symptoms, done with cardiac rehab and work on prevention.    (I10) Primary hypertension  Comment: Under good control currently on lisinopril and metoprolol, if anything may be a little bit too low given his lightheadedness.  Given this will take the liberty of cutting metoprolol down to 1200 mg twice a day.    (E78.00) Pure hypercholesterolemia  Comment: Total cholesterol 112 with an LDL of 52 which is excellent on rosuvastatin, continue this indefinitely.    (I06.0) Rheumatic aortic stenosis  Comment: We performed an echo February 2022 showing peak velocity of 2.1 m/s and mean gradient of 11 mmHg.  This is mild, essentially unchanged from echo at [facility] and recheck echo in February 2023.    (E66.01) Class 3 severe obesity due to excess calories without serious comorbidity in adult, unspecified BMI (H)  Comment: Work on weight loss.    (H81.13) Benign paroxysmal positional vertigo due to bilateral vestibular disorder  Comment: Given his vertigo and lightheadedness, will cut the metoprolol in half especially with a sinus bradycardia to only 12 mg twice a day.  In addition, might consider backing off furosemide, I asked him to do this last time I saw him.  Lastly, I suggest he be seen by his primary, I am concerned about a CNS  lesion given that this is been going on for about a week to 2 weeks.  Would hold off on exercising until this is done.    (N18.31) Stage 3a chronic kidney disease (H)  Comment: He questions me about his GFR 57, I defer to primary but suspect this could be due to low blood pressure as well as low heart rate as well as dehydration.  We will stop the metoprolol, get a recheck renal profile, if still down but certainly stop the furosemide.  Defer to primary.    (E87.5) Hyperkalemia  Comment: This could be hemolysis, although it could also be due to lisinopril which was increased from 20 to 40 mg.  Defer to primary, a little hesitant to stop furosemide just yet since this helps lower potassium.  If however potassium still up would not hesitate to discontinue lisinopril.    Plan  1.  Weight loss.  2.  Speak with primary concerning neurological issues, may need head scan.  3.  Cut metoprolol down to 12.5 mg p.o. twice daily given bradycardia as well as low blood pressure.  4.  Repeat renal profile per his primary, if GFR still down would not hesitate to discontinue furosemide.  If potassium still up do not hesitate to adjust lisinopril.  5.  Follow-up with me in September at which point time we will discontinue his ticagrelor.  We will arrange for repeat echo in February 2023.    Subjective  CC: 76-year-old white gentleman here for follow-up visit.  He tells me about 2 weeks or so ago he woke up suddenly at night sat up and had a difficult time reading the clock.  He felt lightheaded and had blurred vision and this lightheadedness persisted for the next week or so.  He states the week following that he felt like he had vertigo, it was like a magnet was pulling him to the left side when he was trying to walk down the hallway.  These are slowly getting better.  In terms of cardiovascular issues, he does have some shortness of breath and very heavy activity.  No chest pains, palpitations, PND, orthopnea, peripheral edema,  "true syncope.    Medications  Current Outpatient Medications   Medication Sig Dispense Refill     acetaminophen (TYLENOL) 325 MG tablet Take 650 mg by mouth as needed       aspirin 81 MG EC tablet [ASPIRIN 81 MG EC TABLET] Take 81 mg by mouth daily.       furosemide (LASIX) 40 MG tablet Take 40 mg by mouth daily       lisinopril (ZESTRIL) 40 MG tablet Take 40 mg by mouth daily       metoprolol tartrate (LOPRESSOR) 25 MG tablet Take 1 tablet (25 mg) by mouth 2 times daily 180 tablet 3     nitroGLYcerin (NITROSTAT) 0.4 MG sublingual tablet See Admin Instructions       rosuvastatin (CRESTOR) 40 MG tablet Take 1 tablet (40 mg) by mouth daily 90 tablet 3     ticagrelor (BRILINTA) 90 MG tablet Take 1 tablet (90 mg) by mouth 2 times daily 180 tablet 3       Objective  /60 (BP Location: Right arm, Patient Position: Sitting, Cuff Size: Adult Large)   Pulse 56   Resp 16   Ht 1.778 m (5' 10\")   Wt 97 kg (213 lb 12.8 oz)   BMI 30.68 kg/m      General Appearance:    Alert, cooperative, no distress, appears stated age   Head:    Normocephalic, without obvious abnormality, atraumatic   Throat:   Lips, mucosa, and tongue normal; teeth and gums normal   Neck:   Supple, symmetrical, trachea midline, no adenopathy;        thyroid:  No enlargement/tenderness/nodules; no carotid    bruit or JVD   Back:     Symmetric, no curvature, ROM normal, no CVA tenderness   Lungs:     Clear to auscultation bilaterally, respirations unlabored   Chest wall:    No tenderness or deformity   Heart:    Regular rate and rhythm, S1 and S2 normal, 2/6 early peaking low pitched crescendo decrescendo systolic ejection murmur, no rub   or gallop   Abdomen:     Soft, non-tender, bowel sounds active all four quadrants,     no masses, no organomegaly   Extremities:   Normal, atraumatic, no cyanosis or edema   Pulses:   2+ and symmetric all extremities   Skin:   Skin color, texture, turgor normal, no rashes or lesions     Results    Lab Results " personally reviewed   Lab Results   Component Value Date    CHOL 202 (H) 04/05/2021    CHOL 251 (H) 06/25/2020     Lab Results   Component Value Date    HDL 29 (L) 04/05/2021    HDL 33 (L) 06/25/2020     No components found for: LDLCALC  Lab Results   Component Value Date    TRIG 207 (H) 04/05/2021    TRIG 293 (H) 06/25/2020     Lab Results   Component Value Date    WBC 6.1 03/26/2020    HGB 14.7 03/26/2020    HCT 43.4 03/26/2020     03/26/2020     Lab Results   Component Value Date    BUN 19 02/04/2022     02/04/2022    CO2 23 02/04/2022       Reviewed electrocardiogram sinus bradycardia with first-degree AV block with no acute changes.  Rare PAC.

## 2022-02-10 NOTE — PROGRESS NOTES
[facility]  Heart Care Clinic Follow-up Note    Assessment & Plan        (I25.10,  I25.83) Coronary artery disease due to lipid rich plaque  (primary encounter diagnosis)  Comment: Angiography September 2021 due to ST segment elevation inferior MI showed normal left main, 50% mid LAD, 50% mid circumflex, and 95% distal right coronary artery involving the PDA and posterior lateral branches. He received drug-coated stent.  Getting along well without symptoms, done with cardiac rehab and work on prevention.    (I10) Primary hypertension  Comment: Under good control currently on lisinopril and metoprolol, if anything may be a little bit too low given his lightheadedness.  Given this will take the liberty of cutting metoprolol down to 1200 mg twice a day.    (E78.00) Pure hypercholesterolemia  Comment: Total cholesterol 112 with an LDL of 52 which is excellent on rosuvastatin, continue this indefinitely.    (I06.0) Rheumatic aortic stenosis  Comment: We performed an echo February 2022 showing peak velocity of 2.1 m/s and mean gradient of 11 mmHg.  This is mild, essentially unchanged from echo at [facility] and recheck echo in February 2023.    (E66.01) Class 3 severe obesity due to excess calories without serious comorbidity in adult, unspecified BMI (H)  Comment: Work on weight loss.    (H81.13) Benign paroxysmal positional vertigo due to bilateral vestibular disorder  Comment: Given his vertigo and lightheadedness, will cut the metoprolol in half especially with a sinus bradycardia to only 12 mg twice a day.  In addition, might consider backing off furosemide, I asked him to do this last time I saw him.  Lastly, I suggest he be seen by his primary, I am concerned about a CNS lesion given that this is been going on for about a week to 2 weeks.  Would hold off on exercising until this is done.    (N18.31) Stage 3a chronic kidney disease (H)  Comment: He questions me about his GFR 57, I defer to primary  but suspect this could be due to low blood pressure as well as low heart rate as well as dehydration.  We will stop the metoprolol, get a recheck renal profile, if still down but certainly stop the furosemide.  Defer to primary.    (E87.5) Hyperkalemia  Comment: This could be hemolysis, although it could also be due to lisinopril which was increased from 20 to 40 mg.  Defer to primary, a little hesitant to stop furosemide just yet since this helps lower potassium.  If however potassium still up would not hesitate to discontinue lisinopril.    Plan  1.  Weight loss.  2.  Speak with primary concerning neurological issues, may need head scan.  3.  Cut metoprolol down to 12.5 mg p.o. twice daily given bradycardia as well as low blood pressure.  4.  Repeat renal profile per his primary, if GFR still down would not hesitate to discontinue furosemide.  If potassium still up do not hesitate to adjust lisinopril.  5.  Follow-up with me in September at which point time we will discontinue his ticagrelor.  We will arrange for repeat echo in February 2023.    Subjective  CC: 76-year-old white gentleman here for follow-up visit.  He tells me about 2 weeks or so ago he woke up suddenly at night sat up and had a difficult time reading the clock.  He felt lightheaded and had blurred vision and this lightheadedness persisted for the next week or so.  He states the week following that he felt like he had vertigo, it was like a magnet was pulling him to the left side when he was trying to walk down the hallway.  These are slowly getting better.  In terms of cardiovascular issues, he does have some shortness of breath and very heavy activity.  No chest pains, palpitations, PND, orthopnea, peripheral edema, true syncope.    Medications  Current Outpatient Medications   Medication Sig Dispense Refill     acetaminophen (TYLENOL) 325 MG tablet Take 650 mg by mouth as needed       aspirin 81 MG EC tablet [ASPIRIN 81 MG EC TABLET] Take 81 mg  "by mouth daily.       furosemide (LASIX) 40 MG tablet Take 40 mg by mouth daily       lisinopril (ZESTRIL) 40 MG tablet Take 40 mg by mouth daily       metoprolol tartrate (LOPRESSOR) 25 MG tablet Take 1 tablet (25 mg) by mouth 2 times daily 180 tablet 3     nitroGLYcerin (NITROSTAT) 0.4 MG sublingual tablet See Admin Instructions       rosuvastatin (CRESTOR) 40 MG tablet Take 1 tablet (40 mg) by mouth daily 90 tablet 3     ticagrelor (BRILINTA) 90 MG tablet Take 1 tablet (90 mg) by mouth 2 times daily 180 tablet 3       Objective  /60 (BP Location: Right arm, Patient Position: Sitting, Cuff Size: Adult Large)   Pulse 56   Resp 16   Ht 1.778 m (5' 10\")   Wt 97 kg (213 lb 12.8 oz)   BMI 30.68 kg/m      General Appearance:    Alert, cooperative, no distress, appears stated age   Head:    Normocephalic, without obvious abnormality, atraumatic   Throat:   Lips, mucosa, and tongue normal; teeth and gums normal   Neck:   Supple, symmetrical, trachea midline, no adenopathy;        thyroid:  No enlargement/tenderness/nodules; no carotid    bruit or JVD   Back:     Symmetric, no curvature, ROM normal, no CVA tenderness   Lungs:     Clear to auscultation bilaterally, respirations unlabored   Chest wall:    No tenderness or deformity   Heart:    Regular rate and rhythm, S1 and S2 normal, 2/6 early peaking low pitched crescendo decrescendo systolic ejection murmur, no rub   or gallop   Abdomen:     Soft, non-tender, bowel sounds active all four quadrants,     no masses, no organomegaly   Extremities:   Normal, atraumatic, no cyanosis or edema   Pulses:   2+ and symmetric all extremities   Skin:   Skin color, texture, turgor normal, no rashes or lesions     Results    Lab Results personally reviewed   Lab Results   Component Value Date    CHOL 202 (H) 04/05/2021    CHOL 251 (H) 06/25/2020     Lab Results   Component Value Date    HDL 29 (L) 04/05/2021    HDL 33 (L) 06/25/2020     No components found for: LDLCALC  Lab " Results   Component Value Date    TRIG 207 (H) 04/05/2021    TRIG 293 (H) 06/25/2020     Lab Results   Component Value Date    WBC 6.1 03/26/2020    HGB 14.7 03/26/2020    HCT 43.4 03/26/2020     03/26/2020     Lab Results   Component Value Date    BUN 19 02/04/2022     02/04/2022    CO2 23 02/04/2022       Reviewed electrocardiogram sinus bradycardia with first-degree AV block with no acute changes.  Rare PAC.

## 2022-02-10 NOTE — PATIENT INSTRUCTIONS
Mr Romero ALISA Zaldivar,  I enjoyed visiting with you again today.  I am glad to hear you are doing well.  Per our conversation cut the METOPROLOL in 1/2 pill twice a day due to low blood pressure, low heart rate and dizzy.  If shortness of breath worsens let us know as can be side effect of TICAGELOR. We will stop this in September.  Given dizzy I would speak with primary and maybe get a brain scan before getting back on treadmill. I suspect due to low heart rate and blood pressure but want to be safe and check out brain.  As for kidney, stay well hydrated and I would probably ask your primary to consider changing the LASIX of LISINOPRIL.   I will plan on seeing you September.  Jas Hope

## 2022-02-22 ENCOUNTER — LAB REQUISITION (OUTPATIENT)
Dept: LAB | Facility: CLINIC | Age: 77
End: 2022-02-22
Payer: MEDICARE

## 2022-02-22 DIAGNOSIS — Z79.899 OTHER LONG TERM (CURRENT) DRUG THERAPY: ICD-10-CM

## 2022-02-22 LAB
ANION GAP SERPL CALCULATED.3IONS-SCNC: 11 MMOL/L (ref 5–18)
BUN SERPL-MCNC: 19 MG/DL (ref 8–28)
CALCIUM SERPL-MCNC: 9.6 MG/DL (ref 8.5–10.5)
CHLORIDE BLD-SCNC: 101 MMOL/L (ref 98–107)
CO2 SERPL-SCNC: 27 MMOL/L (ref 22–31)
CREAT SERPL-MCNC: 0.98 MG/DL (ref 0.7–1.3)
GFR SERPL CREATININE-BSD FRML MDRD: 79 ML/MIN/1.73M2
GLUCOSE BLD-MCNC: 93 MG/DL (ref 70–125)
POTASSIUM BLD-SCNC: 4.8 MMOL/L (ref 3.5–5)
SODIUM SERPL-SCNC: 139 MMOL/L (ref 136–145)

## 2022-02-22 PROCEDURE — 80048 BASIC METABOLIC PNL TOTAL CA: CPT | Mod: ORL | Performed by: FAMILY MEDICINE

## 2022-05-28 ENCOUNTER — HEALTH MAINTENANCE LETTER (OUTPATIENT)
Age: 77
End: 2022-05-28

## 2022-07-29 ENCOUNTER — MYC MEDICAL ADVICE (OUTPATIENT)
Dept: CARDIOLOGY | Facility: CLINIC | Age: 77
End: 2022-07-29

## 2022-07-29 DIAGNOSIS — I10 ESSENTIAL HYPERTENSION: ICD-10-CM

## 2022-07-29 RX ORDER — METOPROLOL TARTRATE 25 MG/1
12.5 TABLET, FILM COATED ORAL 2 TIMES DAILY
Qty: 90 TABLET | Refills: 3 | Status: ON HOLD | OUTPATIENT
Start: 2022-07-29 | End: 2024-02-22

## 2022-07-29 NOTE — TELEPHONE ENCOUNTER
Jas Hope MD Caswell, Mallory J, RN  Can we check with his pharmacy, what is the issue?  New dose of metoprolol should be 12.5 mg p.o. twice daily.   LF                   rx list updated and message back to patient. -[facility]

## 2022-09-21 ENCOUNTER — OFFICE VISIT (OUTPATIENT)
Dept: CARDIOLOGY | Facility: CLINIC | Age: 77
End: 2022-09-21
Attending: INTERNAL MEDICINE
Payer: MEDICARE

## 2022-09-21 VITALS
HEART RATE: 67 BPM | DIASTOLIC BLOOD PRESSURE: 50 MMHG | OXYGEN SATURATION: 96 % | BODY MASS INDEX: 30.71 KG/M2 | RESPIRATION RATE: 16 BRPM | WEIGHT: 214 LBS | SYSTOLIC BLOOD PRESSURE: 98 MMHG

## 2022-09-21 DIAGNOSIS — I25.83 CORONARY ARTERY DISEASE DUE TO LIPID RICH PLAQUE: Primary | ICD-10-CM

## 2022-09-21 DIAGNOSIS — E78.00 PURE HYPERCHOLESTEROLEMIA: ICD-10-CM

## 2022-09-21 DIAGNOSIS — E66.813 CLASS 3 SEVERE OBESITY DUE TO EXCESS CALORIES WITHOUT SERIOUS COMORBIDITY IN ADULT, UNSPECIFIED BMI (H): ICD-10-CM

## 2022-09-21 DIAGNOSIS — I06.0 RHEUMATIC AORTIC STENOSIS: ICD-10-CM

## 2022-09-21 DIAGNOSIS — I10 PRIMARY HYPERTENSION: ICD-10-CM

## 2022-09-21 DIAGNOSIS — I25.10 CORONARY ARTERY DISEASE DUE TO LIPID RICH PLAQUE: Primary | ICD-10-CM

## 2022-09-21 DIAGNOSIS — N18.31 STAGE 3A CHRONIC KIDNEY DISEASE (H): ICD-10-CM

## 2022-09-21 DIAGNOSIS — E66.01 CLASS 3 SEVERE OBESITY DUE TO EXCESS CALORIES WITHOUT SERIOUS COMORBIDITY IN ADULT, UNSPECIFIED BMI (H): ICD-10-CM

## 2022-09-21 PROCEDURE — 99214 OFFICE O/P EST MOD 30 MIN: CPT | Performed by: INTERNAL MEDICINE

## 2022-09-21 NOTE — LETTER
9/21/2022    [facility]  [address] [address]    RE: Nick Zaldivar       Dear Colleague,     I had the pleasure of seeing Nick Zaldivar in the [facility].      [facility]  Heart Care Clinic Follow-up Note    Assessment & Plan        (I25.10,  I25.83) Coronary artery disease due to lipid rich plaque  (primary encounter diagnosis)  Comment: Angiography September 2021 at [facility] due to ST segment elevation inferior MI showed normal left main, 50% mid LAD, 50% mid circumflex, and 95% distal right coronary artery involving the PDA and posterior lateral branches. He received drug-coated stent.  Getting along well without symptoms, done with cardiac rehab and discontinue ticagrelor today.    (E78.00) Pure hypercholesterolemia  Comment: Total cholesterol 112 with an LDL of 52 which is excellent on rosuvastatin which she will continue indefinitely.    (I10) Primary hypertension  Comment: Under good control on lisinopril as well as metoprolol 12.5 mg twice a day.  Plan on discontinue metoprolol in a year.    (I06.0) Rheumatic aortic stenosis  Comment: Echo February 2022 showed peak velocity 2.1 m/s and mean gradient of 11 mmHg, mild, unchanged from [facility] echo.  We will recheck echo March 2023.  (N18.31) Stage 3a chronic kidney disease (H)  Comment: GFR 57, creatinine good at 0.98 with a repeat GFR at 79.    (E66.01) Class 3 severe obesity due to excess calories without serious comorbidity in adult, unspecified BMI (H)  Comment: He will continue to work on weight loss.    Mild shortness of breath-most likely due to ticagrelor, if does not resolve with discontinuation he will let us know.    Plan  1.  Work on weight loss.  2.  Discontinue ticagrelor and then go to aspirin every day.  3.  Repeat echo for aortic stenosis around February or March 2023.  4.  Follow-up with me in September 2023 or sooner if needed.    Subjective  CC: 76-year-old  white gentleman being seen in follow-up.  Still living independently with his wife, still physically active.  Denies any syncope, presyncope, chest pains, palpitations, shortness breath, PND, orthopnea or peripheral edema.    Medications  Current Outpatient Medications   Medication Sig Dispense Refill     acetaminophen (TYLENOL) 325 MG tablet Take 650 mg by mouth as needed       aspirin 81 MG EC tablet [ASPIRIN 81 MG EC TABLET] Take 81 mg by mouth daily.       furosemide (LASIX) 40 MG tablet Take 40 mg by mouth daily       lisinopril (ZESTRIL) 40 MG tablet Take 40 mg by mouth daily       metoprolol tartrate (LOPRESSOR) 25 MG tablet Take 0.5 tablets (12.5 mg) by mouth 2 times daily 90 tablet 3     nitroGLYcerin (NITROSTAT) 0.4 MG sublingual tablet See Admin Instructions       rosuvastatin (CRESTOR) 40 MG tablet Take 1 tablet (40 mg) by mouth daily 90 tablet 3     ticagrelor (BRILINTA) 90 MG tablet Take 1 tablet (90 mg) by mouth 2 times daily 180 tablet 3       Objective  BP 98/50 (BP Location: Right arm, Patient Position: Sitting, Cuff Size: Adult Regular)   Pulse 67   Resp 16   Wt 97.1 kg (214 lb)   SpO2 96%   BMI 30.71 kg/m      General Appearance:    Alert, cooperative, no distress, appears stated age   Head:    Normocephalic, without obvious abnormality, atraumatic   Throat:   Lips, mucosa, and tongue normal; teeth and gums normal   Neck:   Supple, symmetrical, trachea midline, no adenopathy;        thyroid:  No enlargement/tenderness/nodules; no carotid    bruit or JVD   Back:     Symmetric, no curvature, ROM normal, no CVA tenderness   Lungs:     Clear to auscultation bilaterally, respirations unlabored   Chest wall:    No tenderness or deformity   Heart:    Regular rate and rhythm, S1 and S2 normal, 2/6 crescendo decrescendo systolic ejection murmur, early peaking, no  rub   or gallop   Abdomen:     Soft, non-tender, bowel sounds active all four quadrants,     no masses, no organomegaly   Extremities:    Normal, atraumatic, no cyanosis or edema   Pulses:   2+ and symmetric all extremities   Skin:   Skin color, texture, turgor normal, no rashes or lesions     Results    Lab Results personally reviewed   Lab Results   Component Value Date    CHOL 202 (H) 04/05/2021    CHOL 251 (H) 06/25/2020     Lab Results   Component Value Date    HDL 29 (L) 04/05/2021    HDL 33 (L) 06/25/2020     No components found for: LDLCALC  Lab Results   Component Value Date    TRIG 207 (H) 04/05/2021    TRIG 293 (H) 06/25/2020     Lab Results   Component Value Date    WBC 6.1 03/26/2020    HGB 14.7 03/26/2020    HCT 43.4 03/26/2020     03/26/2020     Lab Results   Component Value Date    BUN 19 02/22/2022     02/22/2022    CO2 27 02/22/2022               Thank you for allowing me to participate in the care of your patient.      Sincerely,     VERONIKA HOPE MD     [facility]  cc:   Veronika Hope MD  [address], [address]  [place] [address]

## 2022-09-21 NOTE — PROGRESS NOTES
[facility]  Heart Care Clinic Follow-up Note    Assessment & Plan        (I25.10,  I25.83) Coronary artery disease due to lipid rich plaque  (primary encounter diagnosis)  Comment: Angiography September 2021 at [facility] due to ST segment elevation inferior MI showed normal left main, 50% mid LAD, 50% mid circumflex, and 95% distal right coronary artery involving the PDA and posterior lateral branches. He received drug-coated stent.  Getting along well without symptoms, done with cardiac rehab and discontinue ticagrelor today.    (E78.00) Pure hypercholesterolemia  Comment: Total cholesterol 112 with an LDL of 52 which is excellent on rosuvastatin which she will continue indefinitely.    (I10) Primary hypertension  Comment: Under good control on lisinopril as well as metoprolol 12.5 mg twice a day.  Plan on discontinue metoprolol in a year.    (I06.0) Rheumatic aortic stenosis  Comment: Echo February 2022 showed peak velocity 2.1 m/s and mean gradient of 11 mmHg, mild, unchanged from [facility] echo.  We will recheck echo March 2023.  (N18.31) Stage 3a chronic kidney disease (H)  Comment: GFR 57, creatinine good at 0.98 with a repeat GFR at 79.    (E66.01) Class 3 severe obesity due to excess calories without serious comorbidity in adult, unspecified BMI (H)  Comment: He will continue to work on weight loss.    Mild shortness of breath-most likely due to ticagrelor, if does not resolve with discontinuation he will let us know.    Plan  1.  Work on weight loss.  2.  Discontinue ticagrelor and then go to aspirin every day.  3.  Repeat echo for aortic stenosis around February or March 2023.  4.  Follow-up with me in September 2023 or sooner if needed.    Subjective  CC: 76-year-old white gentleman being seen in follow-up.  Still living independently with his wife, still physically active.  Denies any syncope, presyncope, chest pains, palpitations, shortness breath, PND, orthopnea or peripheral  edema.    Medications  Current Outpatient Medications   Medication Sig Dispense Refill     acetaminophen (TYLENOL) 325 MG tablet Take 650 mg by mouth as needed       aspirin 81 MG EC tablet [ASPIRIN 81 MG EC TABLET] Take 81 mg by mouth daily.       furosemide (LASIX) 40 MG tablet Take 40 mg by mouth daily       lisinopril (ZESTRIL) 40 MG tablet Take 40 mg by mouth daily       metoprolol tartrate (LOPRESSOR) 25 MG tablet Take 0.5 tablets (12.5 mg) by mouth 2 times daily 90 tablet 3     nitroGLYcerin (NITROSTAT) 0.4 MG sublingual tablet See Admin Instructions       rosuvastatin (CRESTOR) 40 MG tablet Take 1 tablet (40 mg) by mouth daily 90 tablet 3     ticagrelor (BRILINTA) 90 MG tablet Take 1 tablet (90 mg) by mouth 2 times daily 180 tablet 3       Objective  BP 98/50 (BP Location: Right arm, Patient Position: Sitting, Cuff Size: Adult Regular)   Pulse 67   Resp 16   Wt 97.1 kg (214 lb)   SpO2 96%   BMI 30.71 kg/m      General Appearance:    Alert, cooperative, no distress, appears stated age   Head:    Normocephalic, without obvious abnormality, atraumatic   Throat:   Lips, mucosa, and tongue normal; teeth and gums normal   Neck:   Supple, symmetrical, trachea midline, no adenopathy;        thyroid:  No enlargement/tenderness/nodules; no carotid    bruit or JVD   Back:     Symmetric, no curvature, ROM normal, no CVA tenderness   Lungs:     Clear to auscultation bilaterally, respirations unlabored   Chest wall:    No tenderness or deformity   Heart:    Regular rate and rhythm, S1 and S2 normal, 2/6 crescendo decrescendo systolic ejection murmur, early peaking, no  rub   or gallop   Abdomen:     Soft, non-tender, bowel sounds active all four quadrants,     no masses, no organomegaly   Extremities:   Normal, atraumatic, no cyanosis or edema   Pulses:   2+ and symmetric all extremities   Skin:   Skin color, texture, turgor normal, no rashes or lesions     Results    Lab Results personally reviewed   Lab Results    Component Value Date    CHOL 202 (H) 04/05/2021    CHOL 251 (H) 06/25/2020     Lab Results   Component Value Date    HDL 29 (L) 04/05/2021    HDL 33 (L) 06/25/2020     No components found for: LDLCALC  Lab Results   Component Value Date    TRIG 207 (H) 04/05/2021    TRIG 293 (H) 06/25/2020     Lab Results   Component Value Date    WBC 6.1 03/26/2020    HGB 14.7 03/26/2020    HCT 43.4 03/26/2020     03/26/2020     Lab Results   Component Value Date    BUN 19 02/22/2022     02/22/2022    CO2 27 02/22/2022

## 2022-09-21 NOTE — PATIENT INSTRUCTIONS
Mr Nick Zaldivar,  I enjoyed visiting with you again today.  I am glad to hear you are doing well.  Per our conversation stop the BRILANTA and then go to the ASPIRIN every day.  I will plan on seeing you 1 year.  I will check the echo of the valve murmur in March.  If shortness of breath does not improve call me at [phone].  Jas Hope

## 2022-09-22 ENCOUNTER — OFFICE VISIT (OUTPATIENT)
Dept: CARDIOLOGY | Facility: CLINIC | Age: 77
End: 2022-09-22
Payer: MEDICARE

## 2022-09-22 DIAGNOSIS — Z00.6 EXAMINATION OF PARTICIPANT IN CLINICAL TRIAL: Primary | ICD-10-CM

## 2022-09-22 LAB — APO A-I SERPL-MCNC: <6 MG/DL

## 2022-09-22 PROCEDURE — 99207 PR NO CHARGE-RESEARCH SERVICE: CPT

## 2022-09-22 PROCEDURE — 36415 COLL VENOUS BLD VENIPUNCTURE: CPT

## 2022-09-22 PROCEDURE — 83695 ASSAY OF LIPOPROTEIN(A): CPT

## 2022-09-22 NOTE — PROGRESS NOTES
[facility]: A multicenter, Cross sectional study to characterize the Distribution of Lipoprotein(a) Levels Among Patients With Documented History of Atherosclerotic Cardiovascular Disease     [facility] Inclusion/Exclusion Criteria    Inclusion Criteria     101 Subject has provided informed consent prior to initiation of any study specific activities/procedures. Yes   102 Age 18 to 85 years. Yes   103 History of ASCVD as demonstrated by either:  MI (presumed type 1)    And/or    b) PCI (with high-risk features) with at least 1 of the following:  Age > 65 years  Diabetes mellitus  History of ischemic stroke  History of peripheral arterial disease  Residual stenosis > 50%  Multivessel PCI (ie, > 2 vessels, including branch arteries) Yes          Exclusion Criteria All must   be No   201 Subjects known to be currently receiving investigational drug in a clinical study that is anticipated to last > 1 year. No   202 Known Lp(a) value < 90 mg/dL (if measured in mass) or < 200 nmol/L (if measured in molar). No   203 Subject has a diagnosis of end-stage renal disease or requires dialysis. No   204 Poorly controlled (glycated hemoglobin [HbA1c] > 10%) diabetes mellitus (type 1 or type 2). No   205 Subject is receiving or has received lipoprotein apheresis to reduce Lp(a) within 3 months prior to enrollment. No   206 Known uncontrolled or recurrent ventricular tachycardia in the past 3 months prior to enrollment. No   207 Known malignancy (except non-melanoma skin cancers, cervical in situ carcinoma, breast ductal carcinoma in situ, or stage 1 prostate carcinoma) within the last 5 years prior to enrollment. No   208 Known history or evidence of clinically significant disease (eg, respiratory, gastrointestinal, or psychiatric disease) or unstable disorder or biomarker that, in the opinion of the investigator(s), would result in life expectancy < 5 years. No   209 Known hemorrhagic stroke. No       Participant has met all  inclusion criteria and no exclusion criteria and is ready to be enrolled in the [facility] disease prevalence study.     Dr. Hope: Agree this patient can move to Enrollment   [x] Yes   [] No    Neeru Hammer RN

## 2022-09-22 NOTE — PROGRESS NOTES
[facility]- A multicenter, Cross sectional study to characterize the Distribution of Lipoprotein(a) Levels Among Patients With Documented History of Atherosclerotic Cardiovascular Disease       Nick Zaldivar was seen in clinic today for the screening visit.    Research nurse met with subject to discuss consent and participation in the above noted study.    The study discussion included the following:     Study purpose    Qualifications for participation    Length of study participation    Study procedures    Risks and side effects    Benefits (if any)    Voluntary nature of participation    Alternatives to participation    Confidentiality of records    Financial considerations     Subject asked questions and agreed that he received answers that satisfied him.    Consent form [Version 2 - [employer] version 19 Jul 2022] signed on 22 Sep 2022 at 11.20 am. Patient verbalized understanding. A signed copy was offered to the subject & forwarded to medical records. No study procedures were done prior to obtaining informed consent.      VIJAYA obtained.    Did Subject meet all inclusion criteria? Yes  Did Subject meet any Exclusion criteria? No    Lab draw performed without issue at 11.33. Research staff will follow up with subject with results.      Neeru Hammer RN   Clinical Trials Office   [phone]      Current Outpatient Medications:      acetaminophen (TYLENOL) 325 MG tablet, Take 650 mg by mouth as needed, Disp: , Rfl:      aspirin 81 MG EC tablet, [ASPIRIN 81 MG EC TABLET] Take 81 mg by mouth daily., Disp: , Rfl:      furosemide (LASIX) 40 MG tablet, Take 40 mg by mouth daily, Disp: , Rfl:      lisinopril (ZESTRIL) 40 MG tablet, Take 40 mg by mouth daily, Disp: , Rfl:      metoprolol tartrate (LOPRESSOR) 25 MG tablet, Take 0.5 tablets (12.5 mg) by mouth 2 times daily, Disp: 90 tablet, Rfl: 3     nitroGLYcerin (NITROSTAT) 0.4 MG sublingual tablet, See Admin Instructions, Disp: , Rfl:      rosuvastatin (CRESTOR) 40  MG tablet, Take 1 tablet (40 mg) by mouth daily, Disp: 90 tablet, Rfl: 3     ticagrelor (BRILINTA) 90 MG tablet, Take 1 tablet (90 mg) by mouth 2 times daily, Disp: 180 tablet, Rfl: 3  No past medical history on file.  No data found.  Vitals: There were no vitals taken for this visit.  BMI= There is no height or weight on file to calculate BMI.  Other  male  76 year old

## 2022-09-22 NOTE — LETTER
9/22/2022    [facility]  [address] [address]    RE: Nick Zaldivar       Dear Colleague,     I had the pleasure of seeing Nick Zaldivar in the [facility].    [place]: A multicenter, Cross sectional study to characterize the Distribution of Lipoprotein(a) Levels Among Patients With Documented History of Atherosclerotic Cardiovascular Disease     [place] Inclusion/Exclusion Criteria    Inclusion Criteria     101 Subject has provided informed consent prior to initiation of any study specific activities/procedures. Yes   102 Age 18 to 85 years. Yes   103 History of ASCVD as demonstrated by either:  a) MI (presumed type 1)    And/or    b) PCI (with high-risk features) with at least 1 of the following:    Age > 65 years    Diabetes mellitus    History of ischemic stroke    History of peripheral arterial disease    Residual stenosis > 50%    Multivessel PCI (ie, > 2 vessels, including branch arteries) Yes          Exclusion Criteria All must   be No   201 Subjects known to be currently receiving investigational drug in a clinical study that is anticipated to last > 1 year. No   202 Known Lp(a) value < 90 mg/dL (if measured in mass) or < 200 nmol/L (if measured in molar). No   203 Subject has a diagnosis of end-stage renal disease or requires dialysis. No   204 Poorly controlled (glycated hemoglobin [HbA1c] > 10%) diabetes mellitus (type 1 or type 2). No   205 Subject is receiving or has received lipoprotein apheresis to reduce Lp(a) within 3 months prior to enrollment. No   206 Known uncontrolled or recurrent ventricular tachycardia in the past 3 months prior to enrollment. No   207 Known malignancy (except non-melanoma skin cancers, cervical in situ carcinoma, breast ductal carcinoma in situ, or stage 1 prostate carcinoma) within the last 5 years prior to enrollment. No   208 Known history or evidence of clinically significant disease (eg, respiratory,  gastrointestinal, or psychiatric disease) or unstable disorder or biomarker that, in the opinion of the investigator(s), would result in life expectancy < 5 years. No   209 Known hemorrhagic stroke. No       Participant has met all inclusion criteria and no exclusion criteria and is ready to be enrolled in the [facility] disease prevalence study.     Dr. Hope: Agree this patient can move to Enrollment   [] Yes   [] No    Neeru Hammer RN          [facility]- A multicenter, Cross sectional study to characterize the Distribution of Lipoprotein(a) Levels Among Patients With Documented History of Atherosclerotic Cardiovascular Disease       Nick Zaldivar was seen in clinic today for the screening visit.    Research nurse met with subject to discuss consent and participation in the above noted study.    The study discussion included the following:     Study purpose    Qualifications for participation    Length of study participation    Study procedures    Risks and side effects    Benefits (if any)    Voluntary nature of participation    Alternatives to participation    Confidentiality of records    Financial considerations     Subject asked questions and agreed that he received answers that satisfied him.    Consent form [Version 2 - [employer] version 19 Jul 2022] signed on 22 Sep 2022 at 11.20 am. Patient verbalized understanding. A signed copy was offered to the subject & forwarded to medical records. No study procedures were done prior to obtaining informed consent.      VIJAYA obtained.    Did Subject meet all inclusion criteria? Yes  Did Subject meet any Exclusion criteria? No    Lab draw performed without issue at 11.33. Research staff will follow up with subject with results.      Neeru Hammer RN   Clinical Trials Office   [phone]      Current Outpatient Medications:      acetaminophen (TYLENOL) 325 MG tablet, Take 650 mg by mouth as needed, Disp: , Rfl:      aspirin 81 MG EC tablet, [ASPIRIN 81 MG EC TABLET]  Take 81 mg by mouth daily., Disp: , Rfl:      furosemide (LASIX) 40 MG tablet, Take 40 mg by mouth daily, Disp: , Rfl:      lisinopril (ZESTRIL) 40 MG tablet, Take 40 mg by mouth daily, Disp: , Rfl:      metoprolol tartrate (LOPRESSOR) 25 MG tablet, Take 0.5 tablets (12.5 mg) by mouth 2 times daily, Disp: 90 tablet, Rfl: 3     nitroGLYcerin (NITROSTAT) 0.4 MG sublingual tablet, See Admin Instructions, Disp: , Rfl:      rosuvastatin (CRESTOR) 40 MG tablet, Take 1 tablet (40 mg) by mouth daily, Disp: 90 tablet, Rfl: 3     ticagrelor (BRILINTA) 90 MG tablet, Take 1 tablet (90 mg) by mouth 2 times daily, Disp: 180 tablet, Rfl: 3  No past medical history on file.  No data found.  Vitals: There were no vitals taken for this visit.  BMI= There is no height or weight on file to calculate BMI.  Other  male  76 year old    Thank you for allowing me to participate in the care of your patient.      Sincerely,     Neeru Hammer RN     [facility]  cc:   No referring provider defined for this encounter.

## 2022-09-23 ENCOUNTER — DOCUMENTATION ONLY (OUTPATIENT)
Dept: CARDIOLOGY | Facility: CLINIC | Age: 77
End: 2022-09-23
Payer: MEDICARE

## 2022-09-23 PROCEDURE — 99207 PR NO CHARGE-RESEARCH SERVICE: CPT

## 2022-09-23 NOTE — PROGRESS NOTES
[place]: A multicenter, Cross sectional study to characterize the Distribution of Lipoprotein(a) Levels Among Patients With Documented History of Atherosclerotic Cardiovascular Disease     Nick Zaldivar was called today to review results of Lp(a) drawn on 22 Sep 2022.    He was informed of the significance of the results as related to upcoming study and their potential participation.    He affirms understanding.    Nick Zaldivar has no further questions at this time.    Neeru Hammer RN    Recent Results (from the past 240 hour(s))   Lipoprotein (a)    Collection Time: 09/22/22 11:33 AM   Result Value Ref Range    Lipoprotein (a) <6 <30 mg/dL

## 2022-10-01 ENCOUNTER — HEALTH MAINTENANCE LETTER (OUTPATIENT)
Age: 77
End: 2022-10-01

## 2022-12-07 DIAGNOSIS — E78.00 PURE HYPERCHOLESTEROLEMIA: ICD-10-CM

## 2022-12-08 RX ORDER — ROSUVASTATIN CALCIUM 40 MG/1
TABLET, COATED ORAL
Qty: 90 TABLET | Refills: 3 | Status: SHIPPED | OUTPATIENT
Start: 2022-12-08 | End: 2023-12-14

## 2023-03-28 ENCOUNTER — HOSPITAL ENCOUNTER (OUTPATIENT)
Dept: CARDIOLOGY | Facility: CLINIC | Age: 78
Discharge: HOME | End: 2023-03-28
Attending: INTERNAL MEDICINE | Admitting: INTERNAL MEDICINE
Payer: MEDICARE

## 2023-03-28 DIAGNOSIS — I06.0 RHEUMATIC AORTIC STENOSIS: ICD-10-CM

## 2023-03-28 LAB — LVEF ECHO: NORMAL

## 2023-03-28 PROCEDURE — 93306 TTE W/DOPPLER COMPLETE: CPT | Mod: 26 | Performed by: INTERNAL MEDICINE

## 2023-03-28 PROCEDURE — 93306 TTE W/DOPPLER COMPLETE: CPT

## 2023-06-04 ENCOUNTER — HEALTH MAINTENANCE LETTER (OUTPATIENT)
Age: 78
End: 2023-06-04

## 2023-12-05 ENCOUNTER — OFFICE VISIT (OUTPATIENT)
Dept: CARDIOLOGY | Facility: CLINIC | Age: 78
End: 2023-12-05
Payer: MEDICARE

## 2023-12-05 VITALS
DIASTOLIC BLOOD PRESSURE: 65 MMHG | SYSTOLIC BLOOD PRESSURE: 130 MMHG | WEIGHT: 229 LBS | OXYGEN SATURATION: 97 % | RESPIRATION RATE: 16 BRPM | BODY MASS INDEX: 32.86 KG/M2 | HEART RATE: 60 BPM

## 2023-12-05 DIAGNOSIS — I25.83 CORONARY ARTERY DISEASE DUE TO LIPID RICH PLAQUE: Primary | ICD-10-CM

## 2023-12-05 DIAGNOSIS — I06.0 RHEUMATIC AORTIC STENOSIS: ICD-10-CM

## 2023-12-05 DIAGNOSIS — E66.09 CLASS 1 OBESITY DUE TO EXCESS CALORIES WITHOUT SERIOUS COMORBIDITY WITH BODY MASS INDEX (BMI) OF 32.0 TO 32.9 IN ADULT: ICD-10-CM

## 2023-12-05 DIAGNOSIS — E66.811 CLASS 1 OBESITY DUE TO EXCESS CALORIES WITHOUT SERIOUS COMORBIDITY WITH BODY MASS INDEX (BMI) OF 32.0 TO 32.9 IN ADULT: ICD-10-CM

## 2023-12-05 DIAGNOSIS — I25.10 CORONARY ARTERY DISEASE DUE TO LIPID RICH PLAQUE: Primary | ICD-10-CM

## 2023-12-05 DIAGNOSIS — E78.00 PURE HYPERCHOLESTEROLEMIA: ICD-10-CM

## 2023-12-05 DIAGNOSIS — H81.13 BENIGN PAROXYSMAL POSITIONAL VERTIGO DUE TO BILATERAL VESTIBULAR DISORDER: ICD-10-CM

## 2023-12-05 DIAGNOSIS — I10 PRIMARY HYPERTENSION: ICD-10-CM

## 2023-12-05 PROBLEM — N18.31 STAGE 3A CHRONIC KIDNEY DISEASE (H): Status: RESOLVED | Noted: 2022-02-10 | Resolved: 2023-12-05

## 2023-12-05 PROCEDURE — 99214 OFFICE O/P EST MOD 30 MIN: CPT | Performed by: INTERNAL MEDICINE

## 2023-12-05 NOTE — PATIENT INSTRUCTIONS
Mr Nick Zaldivar,  I enjoyed visiting with you again today.  I am concerned with the shortness of breath.  Per our conversation I will check the echo looking at the valve.  I will plan on seeing you 6 months.  Jas Hope

## 2023-12-05 NOTE — LETTER
12/5/2023    [facility]  [address] [address]    RE: Nick Zaldivar       Dear Colleague,     I had the pleasure of seeing Nick Zaldivar in the [facility].      [facility]  Heart Care Clinic Follow-up Note    Assessment & Plan        (I25.10,  I25.83) Coronary artery disease due to lipid rich plaque  (primary encounter diagnosis)  Comment: Angiography September 2021 at [facility] due to ST segment elevation inferior MI showed normal left main, 50% mid LAD, 50% mid circumflex, and 95% distal right coronary artery involving the PDA and posterior lateral branches. He received drug-coated stent.  Given increased shortness of breath we will check echo, if aortic valve does not look significantly stenotic we will then proceed with stress testing to evaluate for further ischemia.    (E78.00) Pure hypercholesterolemia  Comment: Total cholesterol 112 with an LDL of 52 which is excellent on rosuvastatin which he will continue indefinitely.     (I10) Primary hypertension  Comment: Under good control on lisinopril as well as metoprolol 12.5 mg twice a day.  Given blood pressure being a little bit elevated at times, little hesitant to hold metoprolol, continue for the time being.    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  Suspect it may be severe and may need to consider TAVR.  February 2022 showed peak velocity 2.1 m/s and mean gradient of 11 mmHg.    (E66.01) Class 3 severe obesity due to excess calories without serious comorbidity in adult, unspecified BMI (H)  Comment: He will continue to work on weight loss.    (H81.13) Benign paroxysmal positional vertigo due to bilateral vestibular disorder  Comment: Sounds orthostatic, will  check echo, if there is not severe aortic stenosis we will then get event monitor.    Plan  1.  Continue work on weight loss.  2.  Check echo, if severe aortic stenosis refer on for TAVR, otherwise we will get event monitor for 1 week as well as stress nuclear.  3.  Follow with me in 6 months or sooner if needed.    Subjective  CC: 77-year-old white gentleman here for yearly follow-up.  Still living independently with his wife, she still smokes but not around him no more than maybe 2 cigarettes a day.  He complains of shortness of breath on activity over the last several months.  He states now maybe every 3 months or so he gets a vague chest discomfort.  He tells me he now has some orthostatic lightheadedness.  There is no real effort related angina, PND, orthopnea or peripheral edema.    Medications  Current Outpatient Medications   Medication Sig Dispense Refill    acetaminophen (TYLENOL) 325 MG tablet Take 650 mg by mouth as needed      aspirin 81 MG EC tablet [ASPIRIN 81 MG EC TABLET] Take 81 mg by mouth daily.      furosemide (LASIX) 40 MG tablet Take 40 mg by mouth daily      lisinopril (ZESTRIL) 40 MG tablet Take 40 mg by mouth daily      metoprolol tartrate (LOPRESSOR) 25 MG tablet Take 0.5 tablets (12.5 mg) by mouth 2 times daily 90 tablet 3    nitroGLYcerin (NITROSTAT) 0.4 MG sublingual tablet See Admin Instructions      rosuvastatin (CRESTOR) 40 MG tablet TAKE 1 TABLET BY MOUTH  DAILY 90 tablet 3       Objective  /65 (BP Location: Left arm, Patient Position: Sitting, Cuff Size: Adult Large)   Pulse 60   Resp 16   Wt 103.9 kg (229 lb)   SpO2 97%   BMI 32.86 kg/m      General Appearance:    Alert, cooperative, no distress, appears stated age   Head:    Normocephalic, without obvious abnormality, atraumatic   Throat:   Lips, mucosa, and tongue normal; teeth and gums normal   Neck:   Supple, symmetrical, trachea midline, no adenopathy;        thyroid:  No enlargement/tenderness/nodules; no  "carotid    bruit or JVD   Back:     Symmetric, no curvature, ROM normal, no CVA tenderness   Lungs:     Clear to auscultation bilaterally, respirations unlabored   Chest wall:    No tenderness or deformity   Heart:    Regular rate and rhythm, S1 and S2 normal, 2-3/6 mid peaking systolic ejection murmur, no rub   or gallop   Abdomen:     Soft, non-tender, bowel sounds active all four quadrants,     no masses, no organomegaly   Extremities:   Normal, atraumatic, no cyanosis or edema   Pulses:   2+ and symmetric all extremities   Skin:   Skin color, texture, turgor normal, no rashes or lesions     Results    Lab Results personally reviewed   Lab Results   Component Value Date    CHOL 202 (H) 04/05/2021    CHOL 251 (H) 06/25/2020     Lab Results   Component Value Date    HDL 29 (L) 04/05/2021    HDL 33 (L) 06/25/2020     No components found for: \"LDLCALC\"  Lab Results   Component Value Date    TRIG 207 (H) 04/05/2021    TRIG 293 (H) 06/25/2020     Lab Results   Component Value Date    WBC 6.1 03/26/2020    HGB 14.7 03/26/2020    HCT 43.4 03/26/2020     03/26/2020     Lab Results   Component Value Date    BUN 19 02/22/2022     02/22/2022    CO2 27 02/22/2022             Thank you for allowing me to participate in the care of your patient.      Sincerely,     VERONIKA TATUM MD     [facility]  cc:   No referring provider defined for this encounter.      "

## 2023-12-05 NOTE — PROGRESS NOTES
[facility]  Heart Care Clinic Follow-up Note    Assessment & Plan        (I25.10,  I25.83) Coronary artery disease due to lipid rich plaque  (primary encounter diagnosis)  Comment: Angiography September 2021 at [facility] due to ST segment elevation inferior MI showed normal left main, 50% mid LAD, 50% mid circumflex, and 95% distal right coronary artery involving the PDA and posterior lateral branches. He received drug-coated stent.  Given increased shortness of breath we will check echo, if aortic valve does not look significantly stenotic we will then proceed with stress testing to evaluate for further ischemia.    (E78.00) Pure hypercholesterolemia  Comment: Total cholesterol 112 with an LDL of 52 which is excellent on rosuvastatin which he will continue indefinitely.     (I10) Primary hypertension  Comment: Under good control on lisinopril as well as metoprolol 12.5 mg twice a day.  Given blood pressure being a little bit elevated at times, little hesitant to hold metoprolol, continue for the time being.    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  Suspect it may be severe and may need to consider TAVR.  February 2022 showed peak velocity 2.1 m/s and mean gradient of 11 mmHg.    (E66.01) Class 3 severe obesity due to excess calories without serious comorbidity in adult, unspecified BMI (H)  Comment: He will continue to work on weight loss.    (H81.13) Benign paroxysmal positional vertigo due to bilateral vestibular disorder  Comment: Sounds orthostatic, will check echo, if there is not severe aortic stenosis we will then get event monitor.    Plan  1.  Continue work on weight loss.  2.  Check echo, if severe aortic stenosis refer on for TAVR, otherwise we will get event  monitor for 1 week as well as stress nuclear.  3.  Follow with me in 6 months or sooner if needed.    Subjective  CC: 77-year-old white gentleman here for yearly follow-up.  Still living independently with his wife, she still smokes but not around him no more than maybe 2 cigarettes a day.  He complains of shortness of breath on activity over the last several months.  He states now maybe every 3 months or so he gets a vague chest discomfort.  He tells me he now has some orthostatic lightheadedness.  There is no real effort related angina, PND, orthopnea or peripheral edema.    Medications  Current Outpatient Medications   Medication Sig Dispense Refill    acetaminophen (TYLENOL) 325 MG tablet Take 650 mg by mouth as needed      aspirin 81 MG EC tablet [ASPIRIN 81 MG EC TABLET] Take 81 mg by mouth daily.      furosemide (LASIX) 40 MG tablet Take 40 mg by mouth daily      lisinopril (ZESTRIL) 40 MG tablet Take 40 mg by mouth daily      metoprolol tartrate (LOPRESSOR) 25 MG tablet Take 0.5 tablets (12.5 mg) by mouth 2 times daily 90 tablet 3    nitroGLYcerin (NITROSTAT) 0.4 MG sublingual tablet See Admin Instructions      rosuvastatin (CRESTOR) 40 MG tablet TAKE 1 TABLET BY MOUTH  DAILY 90 tablet 3       Objective  /65 (BP Location: Left arm, Patient Position: Sitting, Cuff Size: Adult Large)   Pulse 60   Resp 16   Wt 103.9 kg (229 lb)   SpO2 97%   BMI 32.86 kg/m      General Appearance:    Alert, cooperative, no distress, appears stated age   Head:    Normocephalic, without obvious abnormality, atraumatic   Throat:   Lips, mucosa, and tongue normal; teeth and gums normal   Neck:   Supple, symmetrical, trachea midline, no adenopathy;        thyroid:  No enlargement/tenderness/nodules; no carotid    bruit or JVD   Back:     Symmetric, no curvature, ROM normal, no CVA tenderness   Lungs:     Clear to auscultation bilaterally, respirations unlabored   Chest wall:    No tenderness or deformity   Heart:     "Regular rate and rhythm, S1 and S2 normal, 2-3/6 mid peaking systolic ejection murmur, no rub   or gallop   Abdomen:     Soft, non-tender, bowel sounds active all four quadrants,     no masses, no organomegaly   Extremities:   Normal, atraumatic, no cyanosis or edema   Pulses:   2+ and symmetric all extremities   Skin:   Skin color, texture, turgor normal, no rashes or lesions     Results    Lab Results personally reviewed   Lab Results   Component Value Date    CHOL 202 (H) 04/05/2021    CHOL 251 (H) 06/25/2020     Lab Results   Component Value Date    HDL 29 (L) 04/05/2021    HDL 33 (L) 06/25/2020     No components found for: \"LDLCALC\"  Lab Results   Component Value Date    TRIG 207 (H) 04/05/2021    TRIG 293 (H) 06/25/2020     Lab Results   Component Value Date    WBC 6.1 03/26/2020    HGB 14.7 03/26/2020    HCT 43.4 03/26/2020     03/26/2020     Lab Results   Component Value Date    BUN 19 02/22/2022     02/22/2022    CO2 27 02/22/2022         "

## 2023-12-14 DIAGNOSIS — E78.00 PURE HYPERCHOLESTEROLEMIA: ICD-10-CM

## 2023-12-14 RX ORDER — ROSUVASTATIN CALCIUM 40 MG/1
TABLET, COATED ORAL
Qty: 90 TABLET | Refills: 3 | Status: ON HOLD | OUTPATIENT
Start: 2023-12-14 | End: 2024-03-01

## 2023-12-19 ENCOUNTER — HOSPITAL ENCOUNTER (OUTPATIENT)
Dept: CARDIOLOGY | Facility: CLINIC | Age: 78
Discharge: HOME | End: 2023-12-19
Attending: INTERNAL MEDICINE | Admitting: INTERNAL MEDICINE
Payer: MEDICARE

## 2023-12-19 DIAGNOSIS — I06.0 RHEUMATIC AORTIC STENOSIS: ICD-10-CM

## 2023-12-19 LAB — LVEF ECHO: NORMAL

## 2023-12-19 PROCEDURE — 93306 TTE W/DOPPLER COMPLETE: CPT

## 2023-12-19 PROCEDURE — 93306 TTE W/DOPPLER COMPLETE: CPT | Mod: 26 | Performed by: INTERNAL MEDICINE

## 2023-12-20 ENCOUNTER — TELEPHONE (OUTPATIENT)
Dept: CARDIOLOGY | Facility: CLINIC | Age: 78
End: 2023-12-20
Payer: MEDICARE

## 2023-12-20 DIAGNOSIS — I06.0 RHEUMATIC AORTIC STENOSIS: ICD-10-CM

## 2023-12-20 DIAGNOSIS — I10 PRIMARY HYPERTENSION: Primary | ICD-10-CM

## 2023-12-20 NOTE — TELEPHONE ENCOUNTER
----- Message from Lizbeth Blanco, NIRAJ sent at 12/19/2023  2:34 PM CST -----  Jas Hope MD [place], Lizbeth BOYD, RN  Let him know valve numbers are worse and given his sob I would like him to see valve clinic and proceed with angio and TAVR.  LF

## 2023-12-20 NOTE — TELEPHONE ENCOUNTER
Teodoro Iverson,    Please schedule in valve clinic for aortic stenosis per Dr. Hope.    Thank you,  Trang

## 2023-12-20 NOTE — TELEPHONE ENCOUNTER
Called patient and updated on results and recommendations from echo. He is agreeable to valve clinic consult. Order placed for this and routed to [place] to arrange. -[facility]

## 2024-01-05 ENCOUNTER — DOCUMENTATION ONLY (OUTPATIENT)
Dept: CARDIOLOGY | Facility: CLINIC | Age: 79
End: 2024-01-05
Payer: MEDICARE

## 2024-01-05 DIAGNOSIS — I06.0 RHEUMATIC AORTIC STENOSIS: Primary | ICD-10-CM

## 2024-01-05 NOTE — PROGRESS NOTES
"Valve Clinic Prep Worksheet    Patient Name: Nick Zaldivar  : 1945  AGE: 78 year old     Patient Address: [address] [place] [address] BMI: 33   Height (CM):    Ht Readings from Last 1 Encounters:   02/10/22 1.778 m (5' 10\")                                                      Weight (kg):   Wt Readings from Last 1 Encounters:   23 103.9 kg (229 lb)                                                          Contact info/Phone number:  [phone]                                         Referred by: Dr. Hope   Date: 23 Insurance:Payor: MEDICARE / Plan: MEDICARE / Product Type: Medicare /    Patient Care Team: Patient Care Team:  [facility], [facility] as PCP - Jas Simon MD as Assigned Heart and Vascular Provider    Past Medical History   Problem List:  2022: Benign paroxysmal positional vertigo due to bilateral   vestibular disorder  2022: Stage 3a chronic kidney disease (H)  2022: Hyperkalemia  2021: Coronary artery disease due to lipid rich plaque  2021: Primary hypertension  2021: Pure hypercholesterolemia  2021: Rheumatic aortic stenosis  2021: Obesity    Angiography 2021 at [facility] due to ST segment elevation inferior MI showed normal left main, 50% mid LAD, 50% mid circumflex, and 95% distal right coronary artery involving the PDA and posterior lateral branches. He received drug-coated stent.      Transthoracic Echocardiogram    Date (23): M mmHg Peak V: 3.16 m/sec CURT: 1.2 cm  DI: 0.38 SVI: 44.3 ml/m  EF: 60-65%  Ao max P mmHg      Comment on valves: mitral valve has no evidence of mitral stenosis, mild regurgitation. Tricuspid valve has mild regurgitation. Aortic valve has mild calcification and regurgitation with moderate stenosis.    Heart Cath Summary CTA (TAVR) VASU   No results found.  not ordered  N/A   Labs   Creat:   Creatinine   Date Value Ref Range Status   2022 0.98 0.70 - 1.30 mg/dL " "Final       GFR:   GFR Estimate   Date Value Ref Range Status   02/22/2022 79 >60 mL/min/1.73m2 Final     Comment:     Effective December 21, 2021 eGFRcr in adults is calculated using the 2021 CKD-EPI creatinine equation which includes age and gender (Isabelle keith al., [facility], [place], DOI: 10.1056/RUUDbn1486001)   02/04/2022 57 (L) >60 mL/min/1.73m2 Final     Comment:     Effective December 21, 2021 eGFRcr in adults is calculated using the 2021 CKD-EPI creatinine equation which includes age and gender (Isabelle et al., [facility], [place], DOI: 10.1056/WKWGfg5930040)   04/05/2021 >60 >60 mL/min/1.73m2 Final   06/25/2020 >60 >60 mL/min/1.73m2 Final   03/26/2020 >60 >60 mL/min/1.73m2 Final     GFR Estimate If Black   Date Value Ref Range Status   04/05/2021 >60 >60 mL/min/1.73m2 Final   06/25/2020 >60 >60 mL/min/1.73m2 Final   03/26/2020 >60 >60 mL/min/1.73m2 Final       Potassium:   Potassium   Date Value Ref Range Status   02/22/2022 4.8 3.5 - 5.0 mmol/L Final       Sodium:   Sodium   Date Value Ref Range Status   02/22/2022 139 136 - 145 mmol/L Final       WBC:   WBC   Date Value Ref Range Status   03/26/2020 6.1 4.0 - 11.0 thou/uL Final       Hgb:   Hemoglobin   Date Value Ref Range Status   03/26/2020 14.7 14.0 - 18.0 g/dL Final       HCT:   Hematocrit   Date Value Ref Range Status   03/26/2020 43.4 40.0 - 54.0 % Final       Plts:   Platelet Count   Date Value Ref Range Status   03/26/2020 220 140 - 440 thou/uL Final       Albumin:   Lab Results   Component Value Date    ALBUMIN 3.9 04/05/2021       INR: No results found for: \"INR\"     Current Medications: Allergies:    Current Outpatient Medications   Medication    acetaminophen (TYLENOL) 325 MG tablet    aspirin 81 MG EC tablet    furosemide (LASIX) 40 MG tablet    lisinopril (ZESTRIL) 40 MG tablet    metoprolol tartrate (LOPRESSOR) 25 MG tablet    nitroGLYcerin (NITROSTAT) 0.4 MG sublingual tablet    rosuvastatin (CRESTOR) 40 MG tablet     No current facility-administered medications for " this visit.      No Known Allergies

## 2024-01-10 LAB
ABO/RH(D): NORMAL
ANTIBODY SCREEN: NEGATIVE
SPECIMEN EXPIRATION DATE: NORMAL

## 2024-01-11 ENCOUNTER — LAB (OUTPATIENT)
Dept: CARDIOLOGY | Facility: CLINIC | Age: 79
End: 2024-01-11
Payer: MEDICARE

## 2024-01-11 ENCOUNTER — OFFICE VISIT (OUTPATIENT)
Dept: CARDIOLOGY | Facility: CLINIC | Age: 79
End: 2024-01-11
Payer: MEDICARE

## 2024-01-11 ENCOUNTER — TELEPHONE (OUTPATIENT)
Dept: CARDIOLOGY | Facility: CLINIC | Age: 79
End: 2024-01-11

## 2024-01-11 ENCOUNTER — ALLIED HEALTH/NURSE VISIT (OUTPATIENT)
Dept: CARDIOLOGY | Facility: CLINIC | Age: 79
End: 2024-01-11
Payer: MEDICARE

## 2024-01-11 ENCOUNTER — PREP FOR PROCEDURE (OUTPATIENT)
Dept: CARDIOLOGY | Facility: CLINIC | Age: 79
End: 2024-01-11

## 2024-01-11 VITALS
DIASTOLIC BLOOD PRESSURE: 52 MMHG | HEART RATE: 65 BPM | RESPIRATION RATE: 16 BRPM | BODY MASS INDEX: 33.29 KG/M2 | WEIGHT: 232 LBS | SYSTOLIC BLOOD PRESSURE: 114 MMHG

## 2024-01-11 DIAGNOSIS — I06.0 RHEUMATIC AORTIC STENOSIS: Primary | ICD-10-CM

## 2024-01-11 DIAGNOSIS — I06.0 RHEUMATIC AORTIC STENOSIS: ICD-10-CM

## 2024-01-11 DIAGNOSIS — I51.89 OTHER ILL-DEFINED HEART DISEASES: ICD-10-CM

## 2024-01-11 DIAGNOSIS — R06.09 DOE (DYSPNEA ON EXERTION): ICD-10-CM

## 2024-01-11 DIAGNOSIS — I20.9 ANGINA PECTORIS (H): ICD-10-CM

## 2024-01-11 LAB
ANION GAP SERPL CALCULATED.3IONS-SCNC: 8 MMOL/L (ref 7–15)
BUN SERPL-MCNC: 21.4 MG/DL (ref 8–23)
CALCIUM SERPL-MCNC: 9.2 MG/DL (ref 8.8–10.2)
CHLORIDE SERPL-SCNC: 105 MMOL/L (ref 98–107)
CREAT SERPL-MCNC: 0.95 MG/DL (ref 0.67–1.17)
DEPRECATED HCO3 PLAS-SCNC: 26 MMOL/L (ref 22–29)
EGFRCR SERPLBLD CKD-EPI 2021: 81 ML/MIN/1.73M2
ERYTHROCYTE [DISTWIDTH] IN BLOOD BY AUTOMATED COUNT: 12.3 % (ref 10–15)
GLUCOSE SERPL-MCNC: 103 MG/DL (ref 70–99)
HCT VFR BLD AUTO: 45.1 % (ref 40–53)
HGB BLD-MCNC: 15 G/DL (ref 13.3–17.7)
MCH RBC QN AUTO: 29.8 PG (ref 26.5–33)
MCHC RBC AUTO-ENTMCNC: 33.3 G/DL (ref 31.5–36.5)
MCV RBC AUTO: 90 FL (ref 78–100)
PLATELET # BLD AUTO: 282 10E3/UL (ref 150–450)
POTASSIUM SERPL-SCNC: 4 MMOL/L (ref 3.4–5.3)
RBC # BLD AUTO: 5.03 10E6/UL (ref 4.4–5.9)
SODIUM SERPL-SCNC: 139 MMOL/L (ref 135–145)
WBC # BLD AUTO: 6.7 10E3/UL (ref 4–11)

## 2024-01-11 PROCEDURE — 86900 BLOOD TYPING SEROLOGIC ABO: CPT

## 2024-01-11 PROCEDURE — 99205 OFFICE O/P NEW HI 60 MIN: CPT | Performed by: INTERNAL MEDICINE

## 2024-01-11 PROCEDURE — 80048 BASIC METABOLIC PNL TOTAL CA: CPT

## 2024-01-11 PROCEDURE — 86850 RBC ANTIBODY SCREEN: CPT

## 2024-01-11 PROCEDURE — 93000 ELECTROCARDIOGRAM COMPLETE: CPT | Performed by: INTERNAL MEDICINE

## 2024-01-11 PROCEDURE — 86901 BLOOD TYPING SEROLOGIC RH(D): CPT

## 2024-01-11 PROCEDURE — 99207 PR NO CHARGE LOS: CPT

## 2024-01-11 PROCEDURE — 85027 COMPLETE CBC AUTOMATED: CPT

## 2024-01-11 PROCEDURE — 36415 COLL VENOUS BLD VENIPUNCTURE: CPT

## 2024-01-11 RX ORDER — CARVEDILOL 3.12 MG/1
3.12 TABLET ORAL
Status: ON HOLD | COMMUNITY
Start: 2023-10-05 | End: 2024-01-22

## 2024-01-11 RX ORDER — AMLODIPINE BESYLATE 5 MG/1
1 TABLET ORAL DAILY
Status: ON HOLD | COMMUNITY
Start: 2023-10-05 | End: 2024-01-22

## 2024-01-11 RX ORDER — ASPIRIN 81 MG/1
243 TABLET, CHEWABLE ORAL ONCE
Status: CANCELLED | OUTPATIENT
Start: 2024-01-22

## 2024-01-11 RX ORDER — LIDOCAINE 40 MG/G
CREAM TOPICAL
Status: CANCELLED | OUTPATIENT
Start: 2024-01-11

## 2024-01-11 RX ORDER — SODIUM CHLORIDE 9 MG/ML
INJECTION, SOLUTION INTRAVENOUS CONTINUOUS
Status: CANCELLED | OUTPATIENT
Start: 2024-01-22

## 2024-01-11 RX ORDER — ASPIRIN 325 MG
325 TABLET ORAL ONCE
Status: CANCELLED | OUTPATIENT
Start: 2024-01-22 | End: 2024-01-11

## 2024-01-11 RX ORDER — LOSARTAN POTASSIUM 25 MG/1
1 TABLET ORAL DAILY
Status: ON HOLD | COMMUNITY
Start: 2023-10-05 | End: 2024-01-22

## 2024-01-11 RX ORDER — FENTANYL CITRATE 50 UG/ML
25 INJECTION, SOLUTION INTRAMUSCULAR; INTRAVENOUS
Status: CANCELLED | OUTPATIENT
Start: 2024-01-22

## 2024-01-11 NOTE — TELEPHONE ENCOUNTER
Pre-Procedure Angiogram Education     Nick Zaldivar  [address]  [facility] [address]  [phone] (home)     Procedure cardiologist: Dr. Ring   PCP:  [facility], [facility]  H&P completed by:  Dr. Ring 1/11/24   Admit date 1/22/24  Arrival time:  730 AM  Anticoagulation: No  CPAP: No  Previous PCI: YES- 9/17/2021 [facility]- [facility]IFER x 1 to distal RCA into PDA, balloon angioplasty of PDA branch and PLB done at same time   Bypass Grafts: No  Renal Issues: No  Diabetic?: No  Device?: No  Type:  N/A    Angiogram Teaching    Reason for Visit:  Patient seen for pre-procedure education in preparation for: coronary angiogram, right heart catheterization and possible percutaneous coronary intervention     Procedure Prep:  Primary Cardiologist note dated: Dr. Hope 12/5/23   EKG results obtained, dated: to be obtained date of procedure   T&S- completed 1/11/24- see T&S ext form in chart   BMP, CBC- to be obtained date of procedure     Patient Education  Patient states understanding of procedure and risks and agrees to proceed.    Pre-procedure instructions  Patient instructed to be NPO per anesthesia guidelines; no food after 1130 PM the night prior, clear liquids until 530 AM, nothing by mouth after 530 AM (except a few sips of water with meds if not taken already)   Patient instructed to shower the evening before or the morning of the procedure.  Leave all valuables at home (jewlery, rings, watches, large amounts of money).  Patient understands there are two visitors allowed during patients stay.   Patient instructed to arrange for transportation home following procedure from a responsible family member of friend. No driving for at least 24 hours post-procedure.  Patient instructed to have a responsible adult with them for 24 hours post-procedure.  Post-procedure follow up process.  Conscious sedation discussed.    Pre-procedure medication instructions  Patient instructed on antiplatelet  medication.  Continue medications as scheduled up until the date of procedure unless indicated below.   Patient instructed to take 325 mg of Aspirin am of procedure: Yes  Other medication: instructed to only take amlodipine, coreg, losartan, metoprolol and lisinopril a.m. of the procedure.  Instructed patient to hold all other prescription medications, OTC meds, supplements and vitamins the day of procedure.     *PATIENTS RECORDS AVAILABLE IN Psychiatric UNLESS OTHERWISE INDICATED*      Patient Active Problem List   Diagnosis    Coronary artery disease due to lipid rich plaque    Primary hypertension    Pure hypercholesterolemia    Rheumatic aortic stenosis    Obesity    Benign paroxysmal positional vertigo due to bilateral vestibular disorder    Hyperkalemia       Current Outpatient Medications   Medication Sig Dispense Refill    acetaminophen (TYLENOL) 325 MG tablet Take 650 mg by mouth as needed      amLODIPine (NORVASC) 5 MG tablet Take 1 tablet by mouth daily      aspirin 81 MG EC tablet [ASPIRIN 81 MG EC TABLET] Take 81 mg by mouth daily.      carvedilol (COREG) 3.125 MG tablet Take 3.125 mg by mouth      furosemide (LASIX) 40 MG tablet Take 40 mg by mouth daily      lisinopril (ZESTRIL) 40 MG tablet Take 40 mg by mouth daily      losartan (COZAAR) 25 MG tablet Take 1 tablet by mouth daily      metoprolol tartrate (LOPRESSOR) 25 MG tablet Take 0.5 tablets (12.5 mg) by mouth 2 times daily 90 tablet 3    nitroGLYcerin (NITROSTAT) 0.4 MG sublingual tablet See Admin Instructions      rosuvastatin (CRESTOR) 40 MG tablet TAKE 1 TABLET BY MOUTH DAILY 90 tablet 3       Allergies   Allergen Reactions    Bee Venom      Other Reaction(s): Edema       Orders placed. Patient has my contact information if they have any additional questions or concerns.     Mustapha Greene RN BSN  Structural Heart Coordinator   [facility]  [phone]

## 2024-01-11 NOTE — PROGRESS NOTES
[facility]       [facility]   [address], [place] [address]   [email or web address]   OFFICE: [phone]     IMPRESSION:  Moderate aortic valve stenosis (PV 3.2 m/s, MG 23 mmHg, and calculated valve area 1.2 cm ).   Functional Capacity:  NYHA class 2-3, CCS class 2   Coronary artery disease with history of PCI to the distal RCA  Heart failure with preserved ejection fraction  Metabolic syndrome with hypertension and hyperlipidemia  Obesity, with recent 20 to 30 pound weight gain over the past year      SUMMARY OF RECOMMENDATIONS: We had a long discussion with Mr. Zaldivar and his family regarding natural history of progressive aortic valve stenosis and therapeutic options.  Given he is in the moderate range (approaching severe) we will plan for close monitoring with repeat echocardiogram in 6 months.  In the meantime we will plan for coronary angiogram and right heart catheterization given anginal and dyspnea symptoms as above.  He will discuss weight management options and evaluation for obstructive sleep apnea with his PCP.  Will plan to see him back in our clinic in 6 months time.      Dear Dr. Hope    I had the pleasure of seeing Nick Zaldivar today at the [facility] for evaluation for Transcatheter Aortic Valve Replacement for progressive, calcific aortic valve stenosis at the request of Dr. Hope.  Briefly, patient was recently seen in cardiology clinic for evaluation of dyspnea on exertion and underwent an echocardiogram notable for progressive aortic valve stenosis.  He was referred to our clinic for further follow-up.  Today, Mr. Zaldivar reports progressive dyspnea on exertion over the past year.  He has some associated chest tightness, although he attributes this to chronic tendinitis.  He denies orthopnea or PND, but thinks his chronic lower extremity edema is worse recently.   He denies abdominal bloating or early satiety, reports 20 to 30 pound weight gain over the past year.  He has no palpitations, dizziness, lightheadedness, presyncope, syncope, stroke like symptoms, or claudication.  He endorses snoring.    ROS:  A 14 point review of symptoms was reviewed.  No prior hx of rheumatic fever or chest irradiation. There were no additional findings pertinent to this encounter.     Past Medical History: as above     Social History: Denies tobacco use, minimal alcohol use, no illicit drug use    Family History: No early family history of cardiovascular disease or sudden cardiac death.    Allergies: reviewed   Allergies   Allergen Reactions    Bee Venom      Other Reaction(s): Edema       Pertinent Medications:    Current Outpatient Medications   Medication    acetaminophen (TYLENOL) 325 MG tablet    amLODIPine (NORVASC) 5 MG tablet    aspirin 81 MG EC tablet    carvedilol (COREG) 3.125 MG tablet    furosemide (LASIX) 40 MG tablet    lisinopril (ZESTRIL) 40 MG tablet    losartan (COZAAR) 25 MG tablet    metoprolol tartrate (LOPRESSOR) 25 MG tablet    nitroGLYcerin (NITROSTAT) 0.4 MG sublingual tablet    rosuvastatin (CRESTOR) 40 MG tablet     No current facility-administered medications for this visit.       OBJECTIVE:  Physical Examination   /52 (BP Location: Left arm, Patient Position: Sitting, Cuff Size: Adult Large)   Pulse 65   Resp 16   Wt 105.2 kg (232 lb)   BMI 33.29 kg/m     Constitutional: Not in acute distress  Cognitive: Alert, oriented x 3, Appropriate speech  Chest:  Clear bilaterally   Cardiac: regular rhythm, normal S1/S2, mid peaking systolic murmur. JVP mildly elevated.  Abdoment:  Soft, non-tender  Pulses: 2+ femoral, distal pulses.  No carotid bruits  Skin and Integument: 2+ peripheral edema, extremities warm  Neuro:  Grossly normal for motor, sensory.      Studies:     Laboratory performed June 2023 and reviewed by me personally showed: Serum creatinine of  0.99.    Electrocardiogram performed today and reviewed by me personally showed: Sinus with first-degree AV block, PACs    TTE performed December 2023 and reviewed by me personally showed: Normal LV size and function with estimated EF of 60 to 65%, normal RV size and function, mild AI, moderate aortic valve stenosis with PV 3.2 m/s, MG 23 mmHg, and calculated valve area 1.2 cm .  DI 0.38.  SVI 44.3 mL/m .    Coronary angiogram pending.    I sincerely appreciate the opportunity to participate in this patient's care.  Please do not hesitate to contact me if any questions or concerns arise.     Harman Ring MD  Interventional Cardiology

## 2024-01-11 NOTE — LETTER
Dear Hai,   You are scheduled for a Coronary Angiogram with Possible Percutaneous Coronary Intervention with Dr. Ring. The purpose of this procedure is to assess your coronary arteries and identify if there are any blockages in those arteries. During the procedure a small puncture will be made in an artery in your wrist or leg, the doctor will thread a catheter up to your heart and use specialized x-ray equipment to take pictures of the coronary arteries in your heart. You do not need dental clearance prior to this procedure.      Your procedure (angiogram) will be completed at [facility] [address] [place] [address].     Check-in at the [facility] hospital desk. From there you will be directed to [facility] for your procedure.  There is  parking available Monday-Friday from 8am to 6pm at the main entrance.      Your check-in time is 730 AM on Monday 1/22/24. Plan on spending a good portion of the day at the hospital.  You will require a  for this appointment.     In preparation for this procedure:   -Nothing to eat after 1130 PM the night prior. You may have clear liquids freely until 530 AM. After 530 AM you should have nothing by mouth (with the exception of a small sip of water to take your medication if you have not done so already).  -You may bathe or shower as you normally would, either the night before or the morning of the procedure. Please do not apply any creams, lotions, deodorant, or powders to your chest or torso.??   -Please leave your valuables at home (no jewelry, large amounts of cash or credit card).   -Please do not consume caffeine or tobacco for 12 hours prior to your procedure.   -You are allowed up to 2 adult visitors during your stay      Please follow the medication instructions below. It is okay to take medications with a small amount of water on the day of the procedure before coming to the hospital.                         Medication  Instructions:   It is ok for you to take the following medications before your angiogram if you typically take them in the morning: amlodipine (norvasc), coreg (carvedilol), losartan, metoprolol, lisinopril.     Please also take FOUR baby aspirin (81 mg) OR one full strength aspirin (325 mg) just on the morning of your procedure. You will resume your normal dose of one 81 mg aspirin daily after your procedure.     If a stent (hollow tube to help open your artery up) is placed during your procedure you will be started on an antiplatelet medication. This will be prescribed to you to take for 6 months- 1 year following the placement of your stent. It is VERY important you do NOT interrupt this medication unless your cardiologist tells you it is okay. If you are started on this medication it will be clearly communicated to you and written on your discharge instructions.     Other Instructions:   -No driving for 24 hours after your procedure. If you are discharged home on the same day, you will require a responsible family member or friend to drive you home and a responsible adult to stay with you for 24 hours after your procedure.    -Depending on the results of your procedure, your doctor may decide to keep you overnight in the hospital. This is sometimes necessary for monitoring your heart after the procedure.   -If you wear CPAP, please bring it with you to the hospital.   -Be sure to discuss your activity/work restrictions with your doctor before you are discharged from the hospital. Usually, patients have activity restrictions for approximately 3-5days and can resume normal activities following this.     If you have any questions or concerns prior to your angiogram please feel free to reach out to me at the number listed below. I am available during clinic hours (8am-430 PM) Monday- Friday.       Thank you,  Mustapha Greene RN BSN, CCRN- Structural Heart Coordinator   [facility]   Transcatheter Cardiac Valve  Team  [phone]

## 2024-01-11 NOTE — H&P (VIEW-ONLY)
[facility]       [facility] HE[facility]ARE   [address], [place] [address]   [email or web address]   OFFICE: [phone]     IMPRESSION:  Moderate aortic valve stenosis (PV 3.2 m/s, MG 23 mmHg, and calculated valve area 1.2 cm ).   Functional Capacity:  NYHA class 2-3, CCS class 2   Coronary artery disease with history of PCI to the distal RCA  Heart failure with preserved ejection fraction  Metabolic syndrome with hypertension and hyperlipidemia  Obesity, with recent 20 to 30 pound weight gain over the past year      SUMMARY OF RECOMMENDATIONS: We had a long discussion with Mr. Zaldivar and his family regarding natural history of progressive aortic valve stenosis and therapeutic options.  Given he is in the moderate range (approaching severe) we will plan for close monitoring with repeat echocardiogram in 6 months.  In the meantime we will plan for coronary angiogram and right heart catheterization given anginal and dyspnea symptoms as above.  He will discuss weight management options and evaluation for obstructive sleep apnea with his PCP.  Will plan to see him back in our clinic in 6 months time.      Dear Dr. Hope    I had the pleasure of seeing Nick Zaldivar today at the [facility] for evaluation for Transcatheter Aortic Valve Replacement for progressive, calcific aortic valve stenosis at the request of Dr. Hope.  Briefly, patient was recently seen in cardiology clinic for evaluation of dyspnea on exertion and underwent an echocardiogram notable for progressive aortic valve stenosis.  He was referred to our clinic for further follow-up.  Today, Mr. Zaldivar reports progressive dyspnea on exertion over the past year.  He has some associated chest tightness, although he attributes this to chronic tendinitis.  He denies orthopnea or PND, but thinks his chronic lower extremity edema is worse recently.   He denies abdominal bloating or early satiety, reports 20 to 30 pound weight gain over the past year.  He has no palpitations, dizziness, lightheadedness, presyncope, syncope, stroke like symptoms, or claudication.  He endorses snoring.    ROS:  A 14 point review of symptoms was reviewed.  No prior hx of rheumatic fever or chest irradiation. There were no additional findings pertinent to this encounter.     Past Medical History: as above     Social History: Denies tobacco use, minimal alcohol use, no illicit drug use    Family History: No early family history of cardiovascular disease or sudden cardiac death.    Allergies: reviewed   Allergies   Allergen Reactions    Bee Venom      Other Reaction(s): Edema       Pertinent Medications:    Current Outpatient Medications   Medication    acetaminophen (TYLENOL) 325 MG tablet    amLODIPine (NORVASC) 5 MG tablet    aspirin 81 MG EC tablet    carvedilol (COREG) 3.125 MG tablet    furosemide (LASIX) 40 MG tablet    lisinopril (ZESTRIL) 40 MG tablet    losartan (COZAAR) 25 MG tablet    metoprolol tartrate (LOPRESSOR) 25 MG tablet    nitroGLYcerin (NITROSTAT) 0.4 MG sublingual tablet    rosuvastatin (CRESTOR) 40 MG tablet     No current facility-administered medications for this visit.       OBJECTIVE:  Physical Examination   /52 (BP Location: Left arm, Patient Position: Sitting, Cuff Size: Adult Large)   Pulse 65   Resp 16   Wt 105.2 kg (232 lb)   BMI 33.29 kg/m     Constitutional: Not in acute distress  Cognitive: Alert, oriented x 3, Appropriate speech  Chest:  Clear bilaterally   Cardiac: regular rhythm, normal S1/S2, mid peaking systolic murmur. JVP mildly elevated.  Abdoment:  Soft, non-tender  Pulses: 2+ femoral, distal pulses.  No carotid bruits  Skin and Integument: 2+ peripheral edema, extremities warm  Neuro:  Grossly normal for motor, sensory.      Studies:     Laboratory performed June 2023 and reviewed by me personally showed: Serum creatinine of  0.99.    Electrocardiogram performed today and reviewed by me personally showed: Sinus with first-degree AV block, PACs    TTE performed December 2023 and reviewed by me personally showed: Normal LV size and function with estimated EF of 60 to 65%, normal RV size and function, mild AI, moderate aortic valve stenosis with PV 3.2 m/s, MG 23 mmHg, and calculated valve area 1.2 cm .  DI 0.38.  SVI 44.3 mL/m .    Coronary angiogram pending.    I sincerely appreciate the opportunity to participate in this patient's care.  Please do not hesitate to contact me if any questions or concerns arise.     Harman Ring MD  Interventional Cardiology

## 2024-01-11 NOTE — PROGRESS NOTES
Valve Clinic - 1/11/24   Referring provider: Dr. Hope     See valve clinic consult note from Dr. Ring      Summary of visit: Patient seen by his primary cardiologist 12/5/23 with plans to review an updated echocardiogram given increased SOB and known hx of CAD, s/p PCI and known rheumatic aortic valve disease. Patient has been having episodes of light headedness and increased/progressive SOB. Discussion of symptoms and management of disease were discussed with Dr. Ring.     Social: Patient presented to clinic today with his daughter who will be helping bring him back and forth to his angiogram appointment. Daughter lives with parents.       Plan: Patient will be set up for a coronary angiogram +/- PCI with right heart cath. Patient is scheduled for 1/22 (see separate note with angiogram teach info). Patient will also be scheduled for a repeat echocardiogram in about 6 months to monitor progression of his aortic stenosis.     Reviewed aortic stenosis education information with patient and family including s/s to watch for at home that may indicate he is experiencing progression of stenosis. No further questions at this time. Patient has my direct contact information and was encouraged to call with questions or concerns.       Mustapha Greene RN BSN  [facility]  [facility]  Phone: [phone]  Fax: [phone]

## 2024-01-11 NOTE — LETTER
1/11/2024    [facility] [[email or web address]]  [address] [address]    RE: Nick CUELLAR [place]       Dear Colleague,     I had the pleasure of seeing Nick Zaldivar in the [facility].  [facility]   [address], [place] [address]   [email or web address]   OFFICE: [phone]     IMPRESSION:  Moderate aortic valve stenosis (PV 3.2 m/s, MG 23 mmHg, and calculated valve area 1.2 cm ).   Functional Capacity:  NYHA class 2-3, CCS class 2   Coronary artery disease with history of PCI to the distal RCA  Heart failure with preserved ejection fraction  Metabolic syndrome with hypertension and hyperlipidemia  Obesity, with recent 20 to 30 pound weight gain over the past year      SUMMARY OF RECOMMENDATIONS: We had a long discussion with Mr. Zaldivar and his family regarding natural history of progressive aortic valve stenosis and therapeutic options.  Given he is in the moderate range (approaching severe) we will plan for close monitoring with repeat echocardiogram in 6 months.  In the meantime we will plan for coronary angiogram and right heart catheterization given anginal and dyspnea symptoms as above.  He will discuss weight management options and evaluation for obstructive sleep apnea with his PCP.  Will plan to see him back in our clinic in 6 months time.      Dear Dr. Hope    I had the pleasure of seeing Nick Zaldivar today at the [facility] for evaluation for Transcatheter Aortic Valve Replacement for progressive, calcific aortic valve stenosis at the request of Dr. Hope.  Briefly, patient was recently seen in cardiology clinic for evaluation of dyspnea on exertion and underwent an echocardiogram notable for progressive aortic valve stenosis.  He was referred to our clinic for further follow-up.  Today, Mr. Zaldivar reports progressive dyspnea  on exertion over the past year.  He has some associated chest tightness, although he attributes this to chronic tendinitis.  He denies orthopnea or PND, but thinks his chronic lower extremity edema is worse recently.  He denies abdominal bloating or early satiety, reports 20 to 30 pound weight gain over the past year.  He has no palpitations, dizziness, lightheadedness, presyncope, syncope, stroke like symptoms, or claudication.  He endorses snoring.    ROS:  A 14 point review of symptoms was reviewed.  No prior hx of rheumatic fever or chest irradiation. There were no additional findings pertinent to this encounter.     Past Medical History: as above     Social History: Denies tobacco use, minimal alcohol use, no illicit drug use    Family History: No early family history of cardiovascular disease or sudden cardiac death.    Allergies: reviewed   Allergies   Allergen Reactions    Bee Venom      Other Reaction(s): Edema       Pertinent Medications:    Current Outpatient Medications   Medication    acetaminophen (TYLENOL) 325 MG tablet    amLODIPine (NORVASC) 5 MG tablet    aspirin 81 MG EC tablet    carvedilol (COREG) 3.125 MG tablet    furosemide (LASIX) 40 MG tablet    lisinopril (ZESTRIL) 40 MG tablet    losartan (COZAAR) 25 MG tablet    metoprolol tartrate (LOPRESSOR) 25 MG tablet    nitroGLYcerin (NITROSTAT) 0.4 MG sublingual tablet    rosuvastatin (CRESTOR) 40 MG tablet     No current facility-administered medications for this visit.       OBJECTIVE:  Physical Examination   /52 (BP Location: Left arm, Patient Position: Sitting, Cuff Size: Adult Large)   Pulse 65   Resp 16   Wt 105.2 kg (232 lb)   BMI 33.29 kg/m     Constitutional: Not in acute distress  Cognitive: Alert, oriented x 3, Appropriate speech  Chest:  Clear bilaterally   Cardiac: regular rhythm, normal S1/S2, mid peaking systolic murmur. JVP mildly elevated.  Abdoment:  Soft, non-tender  Pulses: 2+ femoral, distal pulses.  No carotid  bruits  Skin and Integument: 2+ peripheral edema, extremities warm  Neuro:  Grossly normal for motor, sensory.      Studies:     Laboratory performed June 2023 and reviewed by me personally showed: Serum creatinine of 0.99.    Electrocardiogram performed today and reviewed by me personally showed: Sinus with first-degree AV block, PACs    TTE performed December 2023 and reviewed by me personally showed: Normal LV size and function with estimated EF of 60 to 65%, normal RV size and function, mild AI, moderate aortic valve stenosis with PV 3.2 m/s, MG 23 mmHg, and calculated valve area 1.2 cm .  DI 0.38.  SVI 44.3 mL/m .    Coronary angiogram pending.    I sincerely appreciate the opportunity to participate in this patient's care.  Please do not hesitate to contact me if any questions or concerns arise.     Harman Ring MD  Interventional Cardiology       [facility]  cc:   Jas Hope MD  [address], [address]  [place] [address]

## 2024-01-15 LAB
ATRIAL RATE - MUSE: 65 BPM
DIASTOLIC BLOOD PRESSURE - MUSE: NORMAL MMHG
INTERPRETATION ECG - MUSE: NORMAL
P AXIS - MUSE: 61 DEGREES
PR INTERVAL - MUSE: 218 MS
QRS DURATION - MUSE: 78 MS
QT - MUSE: 418 MS
QTC - MUSE: 434 MS
R AXIS - MUSE: 2 DEGREES
SYSTOLIC BLOOD PRESSURE - MUSE: NORMAL MMHG
T AXIS - MUSE: 24 DEGREES
VENTRICULAR RATE- MUSE: 65 BPM

## 2024-01-22 ENCOUNTER — HOSPITAL ENCOUNTER (OUTPATIENT)
Facility: HOSPITAL | Age: 79
Discharge: HOME | End: 2024-01-22
Attending: INTERNAL MEDICINE | Admitting: INTERNAL MEDICINE
Payer: MEDICARE

## 2024-01-22 ENCOUNTER — APPOINTMENT (OUTPATIENT)
Dept: CT IMAGING | Facility: HOSPITAL | Age: 79
End: 2024-01-22
Attending: THORACIC SURGERY (CARDIOTHORACIC VASCULAR SURGERY)
Payer: MEDICARE

## 2024-01-22 ENCOUNTER — APPOINTMENT (OUTPATIENT)
Dept: ULTRASOUND IMAGING | Facility: HOSPITAL | Age: 79
End: 2024-01-22
Attending: THORACIC SURGERY (CARDIOTHORACIC VASCULAR SURGERY)
Payer: MEDICARE

## 2024-01-22 ENCOUNTER — TELEPHONE (OUTPATIENT)
Dept: CARDIOLOGY | Facility: CLINIC | Age: 79
End: 2024-01-22

## 2024-01-22 VITALS
HEIGHT: 70 IN | TEMPERATURE: 98.3 F | HEART RATE: 52 BPM | DIASTOLIC BLOOD PRESSURE: 70 MMHG | SYSTOLIC BLOOD PRESSURE: 145 MMHG | OXYGEN SATURATION: 96 % | RESPIRATION RATE: 16 BRPM | WEIGHT: 232 LBS | BODY MASS INDEX: 33.21 KG/M2

## 2024-01-22 DIAGNOSIS — I25.10 CORONARY ARTERY DISEASE DUE TO LIPID RICH PLAQUE: Primary | ICD-10-CM

## 2024-01-22 DIAGNOSIS — I25.83 CORONARY ARTERY DISEASE DUE TO LIPID RICH PLAQUE: Primary | ICD-10-CM

## 2024-01-22 DIAGNOSIS — E87.79 OTHER HYPERVOLEMIA: ICD-10-CM

## 2024-01-22 DIAGNOSIS — R06.09 DOE (DYSPNEA ON EXERTION): ICD-10-CM

## 2024-01-22 DIAGNOSIS — I51.89 OTHER ILL-DEFINED HEART DISEASES: ICD-10-CM

## 2024-01-22 DIAGNOSIS — I06.0 RHEUMATIC AORTIC STENOSIS: ICD-10-CM

## 2024-01-22 DIAGNOSIS — I20.9 ANGINA PECTORIS (H): ICD-10-CM

## 2024-01-22 PROBLEM — Z98.890 STATUS POST CORONARY ANGIOGRAM: Status: ACTIVE | Noted: 2024-01-22

## 2024-01-22 LAB
ACT BLD: 190 SECONDS (ref 74–150)
ACT BLD: 201 SECONDS (ref 74–150)
ACT BLD: 282 SECONDS (ref 74–150)
ANION GAP SERPL CALCULATED.3IONS-SCNC: 9 MMOL/L (ref 7–15)
ATRIAL RATE - MUSE: 67 BPM
BASE EXCESS BLDV CALC-SCNC: 1.5 MMOL/L (ref -3–3)
BASE EXCESS BLDV CALC-SCNC: 2.8 MMOL/L (ref -3–3)
BUN SERPL-MCNC: 20.1 MG/DL (ref 8–23)
CALCIUM SERPL-MCNC: 8.8 MG/DL (ref 8.8–10.2)
CHLORIDE SERPL-SCNC: 108 MMOL/L (ref 98–107)
CREAT SERPL-MCNC: 0.96 MG/DL (ref 0.67–1.17)
DEPRECATED HCO3 PLAS-SCNC: 26 MMOL/L (ref 22–29)
DIASTOLIC BLOOD PRESSURE - MUSE: NORMAL MMHG
EGFRCR SERPLBLD CKD-EPI 2021: 80 ML/MIN/1.73M2
ERYTHROCYTE [DISTWIDTH] IN BLOOD BY AUTOMATED COUNT: 12.1 % (ref 10–15)
GLUCOSE SERPL-MCNC: 101 MG/DL (ref 70–99)
HCO3 BLDV-SCNC: 25 MMOL/L (ref 21–28)
HCO3 BLDV-SCNC: 26 MMOL/L (ref 21–28)
HCT VFR BLD AUTO: 42.2 % (ref 40–53)
HGB BLD-MCNC: 13.9 G/DL (ref 13.3–17.7)
INTERPRETATION ECG - MUSE: NORMAL
MCH RBC QN AUTO: 29.8 PG (ref 26.5–33)
MCHC RBC AUTO-ENTMCNC: 32.9 G/DL (ref 31.5–36.5)
MCV RBC AUTO: 91 FL (ref 78–100)
OXYHGB MFR BLDV: 68 % (ref 70–75)
OXYHGB MFR BLDV: 91 % (ref 70–75)
P AXIS - MUSE: 37 DEGREES
PCO2 BLDV: 43 MM HG (ref 40–50)
PCO2 BLDV: 48 MM HG (ref 40–50)
PH BLDV: 7.38 [PH] (ref 7.32–7.43)
PH BLDV: 7.39 [PH] (ref 7.32–7.43)
PLATELET # BLD AUTO: 242 10E3/UL (ref 150–450)
PO2 BLDV: 35 MM HG (ref 25–47)
PO2 BLDV: 60 MM HG (ref 25–47)
POTASSIUM SERPL-SCNC: 4 MMOL/L (ref 3.4–5.3)
PR INTERVAL - MUSE: 220 MS
QRS DURATION - MUSE: 70 MS
QT - MUSE: 420 MS
QTC - MUSE: 443 MS
R AXIS - MUSE: 4 DEGREES
RBC # BLD AUTO: 4.66 10E6/UL (ref 4.4–5.9)
SAO2 % BLDV: 69 % (ref 70–75)
SAO2 % BLDV: 92 % (ref 70–75)
SODIUM SERPL-SCNC: 143 MMOL/L (ref 135–145)
SYSTOLIC BLOOD PRESSURE - MUSE: NORMAL MMHG
T AXIS - MUSE: 14 DEGREES
VENTRICULAR RATE- MUSE: 67 BPM
WBC # BLD AUTO: 6.5 10E3/UL (ref 4–11)

## 2024-01-22 PROCEDURE — 85014 HEMATOCRIT: CPT | Performed by: INTERNAL MEDICINE

## 2024-01-22 PROCEDURE — 93456 R HRT CORONARY ARTERY ANGIO: CPT | Mod: 26 | Performed by: INTERNAL MEDICINE

## 2024-01-22 PROCEDURE — C1887 CATHETER, GUIDING: HCPCS | Performed by: INTERNAL MEDICINE

## 2024-01-22 PROCEDURE — 255N000002 HC RX 255 OP 636: Performed by: INTERNAL MEDICINE

## 2024-01-22 PROCEDURE — 250N000009 HC RX 250: Performed by: INTERNAL MEDICINE

## 2024-01-22 PROCEDURE — 93880 EXTRACRANIAL BILAT STUDY: CPT

## 2024-01-22 PROCEDURE — 80048 BASIC METABOLIC PNL TOTAL CA: CPT | Performed by: INTERNAL MEDICINE

## 2024-01-22 PROCEDURE — 85347 COAGULATION TIME ACTIVATED: CPT

## 2024-01-22 PROCEDURE — 99152 MOD SED SAME PHYS/QHP 5/>YRS: CPT | Performed by: INTERNAL MEDICINE

## 2024-01-22 PROCEDURE — C1769 GUIDE WIRE: HCPCS | Performed by: INTERNAL MEDICINE

## 2024-01-22 PROCEDURE — 82805 BLOOD GASES W/O2 SATURATION: CPT

## 2024-01-22 PROCEDURE — 71250 CT THORAX DX C-: CPT | Mod: MG

## 2024-01-22 PROCEDURE — 36415 COLL VENOUS BLD VENIPUNCTURE: CPT | Performed by: INTERNAL MEDICINE

## 2024-01-22 PROCEDURE — 999N000054 HC STATISTIC EKG NON-CHARGEABLE

## 2024-01-22 PROCEDURE — 93456 R HRT CORONARY ARTERY ANGIO: CPT | Performed by: INTERNAL MEDICINE

## 2024-01-22 PROCEDURE — 250N000013 HC RX MED GY IP 250 OP 250 PS 637: Performed by: NURSE PRACTITIONER

## 2024-01-22 PROCEDURE — 258N000003 HC RX IP 258 OP 636: Performed by: INTERNAL MEDICINE

## 2024-01-22 PROCEDURE — 272N000001 HC OR GENERAL SUPPLY STERILE: Performed by: INTERNAL MEDICINE

## 2024-01-22 PROCEDURE — 250N000011 HC RX IP 250 OP 636: Performed by: INTERNAL MEDICINE

## 2024-01-22 PROCEDURE — C1894 INTRO/SHEATH, NON-LASER: HCPCS | Performed by: INTERNAL MEDICINE

## 2024-01-22 PROCEDURE — 93005 ELECTROCARDIOGRAM TRACING: CPT

## 2024-01-22 PROCEDURE — 93010 ELECTROCARDIOGRAM REPORT: CPT | Mod: HOP | Performed by: INTERNAL MEDICINE

## 2024-01-22 RX ORDER — FENTANYL CITRATE 50 UG/ML
25 INJECTION, SOLUTION INTRAMUSCULAR; INTRAVENOUS
Status: DISCONTINUED | OUTPATIENT
Start: 2024-01-22 | End: 2024-01-22 | Stop reason: HOSPADM

## 2024-01-22 RX ORDER — LIDOCAINE 40 MG/G
CREAM TOPICAL
Status: DISCONTINUED | OUTPATIENT
Start: 2024-01-22 | End: 2024-01-22 | Stop reason: HOSPADM

## 2024-01-22 RX ORDER — OXYCODONE HYDROCHLORIDE 5 MG/1
10 TABLET ORAL EVERY 4 HOURS PRN
Status: DISCONTINUED | OUTPATIENT
Start: 2024-01-22 | End: 2024-01-22 | Stop reason: HOSPADM

## 2024-01-22 RX ORDER — NALOXONE HYDROCHLORIDE 0.4 MG/ML
0.2 INJECTION, SOLUTION INTRAMUSCULAR; INTRAVENOUS; SUBCUTANEOUS
Status: DISCONTINUED | OUTPATIENT
Start: 2024-01-22 | End: 2024-01-22 | Stop reason: HOSPADM

## 2024-01-22 RX ORDER — IODIXANOL 320 MG/ML
INJECTION, SOLUTION INTRAVASCULAR
Status: DISCONTINUED | OUTPATIENT
Start: 2024-01-22 | End: 2024-01-22 | Stop reason: HOSPADM

## 2024-01-22 RX ORDER — ASPIRIN 325 MG
325 TABLET ORAL ONCE
Status: COMPLETED | OUTPATIENT
Start: 2024-01-22 | End: 2024-01-22

## 2024-01-22 RX ORDER — HEPARIN SODIUM 1000 [USP'U]/ML
INJECTION, SOLUTION INTRAVENOUS; SUBCUTANEOUS
Status: DISCONTINUED | OUTPATIENT
Start: 2024-01-22 | End: 2024-01-22 | Stop reason: HOSPADM

## 2024-01-22 RX ORDER — ASPIRIN 81 MG/1
243 TABLET, CHEWABLE ORAL ONCE
Status: COMPLETED | OUTPATIENT
Start: 2024-01-22 | End: 2024-01-22

## 2024-01-22 RX ORDER — DIAZEPAM 5 MG
5 TABLET ORAL ONCE
Status: COMPLETED | OUTPATIENT
Start: 2024-01-22 | End: 2024-01-22

## 2024-01-22 RX ORDER — SODIUM CHLORIDE 9 MG/ML
INJECTION, SOLUTION INTRAVENOUS CONTINUOUS
Status: DISCONTINUED | OUTPATIENT
Start: 2024-01-22 | End: 2024-01-22 | Stop reason: HOSPADM

## 2024-01-22 RX ORDER — FUROSEMIDE 20 MG
40 TABLET ORAL 2 TIMES DAILY
Qty: 60 TABLET | Refills: 11 | Status: ON HOLD | OUTPATIENT
Start: 2024-01-22 | End: 2024-03-01

## 2024-01-22 RX ORDER — FUROSEMIDE 20 MG
40 TABLET ORAL
Status: DISCONTINUED | OUTPATIENT
Start: 2024-01-22 | End: 2024-01-22

## 2024-01-22 RX ORDER — FENTANYL CITRATE 50 UG/ML
INJECTION, SOLUTION INTRAMUSCULAR; INTRAVENOUS
Status: DISCONTINUED | OUTPATIENT
Start: 2024-01-22 | End: 2024-01-22 | Stop reason: HOSPADM

## 2024-01-22 RX ORDER — FLUMAZENIL 0.1 MG/ML
0.2 INJECTION, SOLUTION INTRAVENOUS
Status: DISCONTINUED | OUTPATIENT
Start: 2024-01-22 | End: 2024-01-22 | Stop reason: HOSPADM

## 2024-01-22 RX ORDER — NALOXONE HYDROCHLORIDE 0.4 MG/ML
0.4 INJECTION, SOLUTION INTRAMUSCULAR; INTRAVENOUS; SUBCUTANEOUS
Status: DISCONTINUED | OUTPATIENT
Start: 2024-01-22 | End: 2024-01-22 | Stop reason: HOSPADM

## 2024-01-22 RX ORDER — ACETAMINOPHEN 325 MG/1
650 TABLET ORAL EVERY 4 HOURS PRN
Status: DISCONTINUED | OUTPATIENT
Start: 2024-01-22 | End: 2024-01-22 | Stop reason: HOSPADM

## 2024-01-22 RX ORDER — OXYCODONE HYDROCHLORIDE 5 MG/1
5 TABLET ORAL EVERY 4 HOURS PRN
Status: DISCONTINUED | OUTPATIENT
Start: 2024-01-22 | End: 2024-01-22 | Stop reason: HOSPADM

## 2024-01-22 RX ORDER — ISOSORBIDE MONONITRATE 30 MG/1
30 TABLET, EXTENDED RELEASE ORAL DAILY
Qty: 30 TABLET | Refills: 3 | Status: ON HOLD | OUTPATIENT
Start: 2024-01-22 | End: 2024-02-22

## 2024-01-22 RX ORDER — ATROPINE SULFATE 0.1 MG/ML
0.5 INJECTION INTRAVENOUS
Status: DISCONTINUED | OUTPATIENT
Start: 2024-01-22 | End: 2024-01-22 | Stop reason: HOSPADM

## 2024-01-22 RX ADMIN — SODIUM CHLORIDE: 9 INJECTION, SOLUTION INTRAVENOUS at 08:42

## 2024-01-22 RX ADMIN — DIAZEPAM 5 MG: 5 TABLET ORAL at 09:46

## 2024-01-22 ASSESSMENT — ACTIVITIES OF DAILY LIVING (ADL)
ADLS_ACUITY_SCORE: 35

## 2024-01-22 NOTE — PROGRESS NOTES
D/I/A: Patient is tolerating liquids and foods, ambulating, urinating, puncture sites are stable (no bleeding or hematoma) and patient has a .   A+O x 4 and making needs known.  CCL access sites C/D/I with no bleeding or hematoma.  CMS intact.  VSSA.   IV access removed.    Education completed and outlined in AVS.  Medication changes reviewed with patient. Questions answered prior to discharge. Belongings returned to patient at discharge  P: Discharged to self care.  Patient to follow up as per discharge instruction

## 2024-01-22 NOTE — Clinical Note
LCA Cine(s)  injected and visualized utilizing power injector system. Patient: Catina Abbasi    YOB: 1963      Patient Active Problem List    Diagnosis Date Noted    Type 2 diabetes mellitus with complication ([address] 54/34/5917     Priority: High     Overview Note:           Coronary artery disease involving native coronary artery of native heart with angina pectoris ([address] 02/15/2016     Priority: High     Overview Note:          Overview:   Overview:          S/P CABG x 4 02/05/2016     Priority: High    Essential hypertension 10/21/2015     Priority: High    Mixed hyperlipidemia 02/15/2016     Priority: Medium    Morbid obesity due to excess calories ([address] 02/15/2016     Priority: Low    Long term (current) use of antithrombotics/antiplatelets 46/29/8548    Pure hypercholesterolemia, unspecified 03/15/2019    NAFLD (nonalcoholic fatty liver disease) 07/12/2017     Overview Note:     MRI 5/2/17    Overview:   Overview:   MRI 5/2/17       Past Medical History:   Diagnosis Date    Coronary artery disease involving native coronary artery of native heart with angina pectoris ([address] 2/15/2016    Diabetes mellitus ([address]     Hypertension     Mixed hyperlipidemia 2/15/2016    Morbid obesity due to excess calories ([address] 2/15/2016    NAFLD (nonalcoholic fatty liver disease) 7/12/2017    S/P CABG x 4 2/5/2016     Past Surgical History:   Procedure Laterality Date    ARTERIAL BYPASS SURGRY  01/12/2016     Social History     Socioeconomic History    Marital status: Single     Spouse name: Not on file    Number of children: Not on file    Years of education: Not on file    Highest education level: Not on file   Occupational History    Not on file   Social Needs    Financial resource strain: Not on file    Food insecurity     Worry: Not on file     Inability: Not on file    Transportation needs     Medical: Not on file     Non-medical: Not on file   Tobacco Use    Smoking status: Former Smoker     Packs/day: 3.00 Years: 5.00     Pack years: 15.00     Types: Cigarettes     Last attempt to quit: 4/15/1986     Years since quittin.6    Smokeless tobacco: Never Used   Substance and Sexual Activity    Alcohol use:  Yes     Alcohol/week: 0.0 standard drinks    Drug use: Not on file    Sexual activity: Yes     Partners: Female   Lifestyle    Physical activity     Days per week: Not on file     Minutes per session: Not on file    Stress: Not on file   Relationships    Social connections     Talks on phone: Not on file     Gets together: Not on file     Attends [employer] service: Not on file     Active member of club or organization: Not on file     Attends meetings of clubs or organizations: Not on file     Relationship status: Not on file    Intimate partner violence     Fear of current or ex partner: Not on file     Emotionally abused: Not on file     Physically abused: Not on file     Forced sexual activity: Not on file   Other Topics Concern    Not on file   Social History Narrative    Not on file     Family History   Problem Relation Age of Onset    Heart Disease Mother     Cancer Father         Pancreatic     Heart Disease Maternal Grandmother     Heart Disease Paternal Grandfather      Current Outpatient Medications on File Prior to Visit   Medication Sig Dispense Refill    clopidogrel (PLAVIX) 75 MG tablet take 1 tablet by mouth once daily 90 tablet 1    lisinopril-hydroCHLOROthiazide (PRINZIDE;ZESTORETIC) 10-12.5 MG per tablet Take 1 tablet by mouth daily 90 tablet 1    atorvastatin (LIPITOR) 40 MG tablet Take 1 tablet by mouth daily 90 tablet 1    empagliflozin (JARDIANCE) 10 MG tablet Take 1 tablet by mouth daily 30 tablet 3    insulin lispro protamine & lispro (HUMALOG MIX 75/25 KWIKPEN) (75-25) 100 UNIT per ML SUPN injection pen 20 units before breakfast, 25 units before dinner (Patient taking differently: 25 units before breakfast, 25 units before dinner) 5 pen 3     MG capsule take 1 capsule by mouth once daily if needed for constipation 90 capsule 1    Semaglutide, 1 MG/DOSE, (OZEMPIC, 1 MG/DOSE,) 2 MG/1.5ML SOPN Inject 1 mg into the skin once a week 4 pen 3    metFORMIN (GLUCOPHAGE XR) 500 MG extended release tablet Take 2 tablets by mouth 2 times daily 360 tablet 1    ondansetron (ZOFRAN ODT) 4 MG disintegrating tablet Take 1 tablet by mouth every 8 hours as needed for Nausea 20 tablet 0    aspirin EC 81 MG EC tablet Take 1 tablet by mouth daily 90 tablet 1    Cyanocobalamin (VITAMIN B 12 PO) Take by mouth      Ascorbic Acid (VITAMIN C) 250 MG tablet Take 250 mg by mouth daily      VITAMIN D PO Take by mouth      GARLIC OIL PO Take by mouth      APPLE CIDER VINEGAR PO Take by mouth      Omega-3 Fatty Acids (FISH OIL PO) Take by mouth      Multiple Vitamin (MULTI-DAY PO) Take by mouth      NONFORMULARY Beet powder, celery powder, green powder      metoprolol succinate (TOPROL XL) 50 MG extended release tablet Take 1 tablet by mouth 2 times daily 180 tablet 1    Handicap Placard MISC by Does not apply route Exp: 1 each 0    Continuous Blood Gluc  (DEXCOM G6 ) RAY 1 Device by Does not apply route continuous 1 Device 0    Continuous Blood Gluc Sensor (DEXCOM G6 SENSOR) MISC 1 Device by Does not apply route continuous 3 each 11    Continuous Blood Gluc Transmit (DEXCOM G6 TRANSMITTER) MISC 1 Device by Does not apply route continuous 1 each 3    dapagliflozin (FARXIGA) 10 MG tablet Take 1 tablet by mouth daily 30 tablet 03    blood glucose monitor strips 1 strip by Other route 4 times daily (before meals and nightly) 150 strip 3    blood glucose monitor kit and supplies 1 kit by Other route 4 times daily (before meals and nightly) 1 kit 0    Lancets MISC 1 each by Does not apply route 4 times daily (before meals and nightly) 150 each 3    Insulin Pen Needle (NOVOFINE) 32G X 6 MM MISC 1 Device by Does not apply route 2 times daily (with meals) 300 each 3     No current facility-administered medications on file prior to visit. No Known Allergies    Chief Complaint   Patient presents with    Diabetes     TELEHEALTH EVALUATION -- Audio/Visual (During JFPJR-94 public health emergency)    Due to COVID 19 outbreak, patient's office visit was converted to a virtual visit. Patient was contacted and agreed to proceed with a virtual visit via [employer]. me  The risks and benefits of converting to a virtual visit were discussed in light of the current infectious disease epidemic. Patient also understood that insurance coverage and co-pays are up to their individual insurance plans. Pursuant to the emergency declaration under the [facility] [address] waiver authority and the [employer] Act, this Virtual  Visit was conducted, with patient's consent, to reduce the patient's risk of exposure to COVID-19 and provide continuity of care for an established patient. Services were provided through a video discussion virtually to substitute for in-person clinic visit. HPI:  Treatment Adherence:   Medication compliance:  Most of the time  Diet compliance: tries to make smart choices  Current exercise: walking 2-3 times a week    Diabetes Mellitus Type 2: Current symptoms/problems include increased thirst.    Home blood sugar records:  Fasting this morning 151  Any episodes of hypoglycemia? no  Tobacco history: He  reports that he quit smoking about 34 years ago. His smoking use included cigarettes. He has a 15.00 pack-year smoking history. He has never used smokeless tobacco.   Daily Aspirin? Yes  Have you had yourannual diabetic retinal (eye) exam? yes    Hypertension:  Home blood pressure monitoring: No.  Heis adherent to a low sodium diet. Patient denies chest pain and shortness of breath.   Antihypertensive medication side effects: no medication side effects noted and nomedication side effects noted. Use of agents associated with hypertension: none. Hyperlipidemia:  No new myalgias or GI upset on atorvastatin (Lipitor). Lab Results   Component Value Date    LABA1C 11.8 (H) 09/18/2020    LABA1C 14 06/04/2020    LABA1C 14.0 02/24/2020     Lab Results   Component Value Date    LABMICR <1.20 06/04/2020    CREATININE 0.91 09/18/2020     Lab Results   Component Value Date    ALT 29 09/18/2020    AST 26 09/18/2020     Lab Results   Component Value Date    CHOL 118 02/27/2019    TRIG 195 (A) 02/27/2019    HDL 32 (A) 02/27/2019    LDLCALC 47 02/27/2019        Diabetes and Hypertension Visit Information    BP Readings from Last 3 Encounters:   09/01/20 112/79   07/10/20 102/64   06/04/20 112/70          Have you seen any other physician or provider since your last visit? yes  Have you had any other diagnostic tests since your last visit? yes  Have you been seen in the emergency room and/or had an admission to a hospital since we last saw you? yes    Patient Care Team:  Idris Angel MD as PCP - General (Family Medicine)  Idris Angel MD as PCP - [facility] Empaneled Provider  Ravin Pappas MD (Urology)  Tiesha Lerma MD as Consulting Physician (Endocrinology)  Monroe Campbell MD as Consulting Physician (Cardiology)  Katerin Trevino, PhD (Psychology)           Health Maintenance   Topic Date Due    HIV screen  08/31/1977    Hepatitis B vaccine (1 of 3 - Risk 3-dose series) 08/31/1981    Shingles Vaccine (1 of 2) 08/31/2012    Diabetic retinal exam  12/01/2016    Lipid screen  02/27/2020    A1C test (Diabetic or Prediabetic)  12/18/2020    Diabetic foot exam  02/24/2021    Diabetic microalbuminuria test  06/04/2021    Annual Wellness Visit (AWV)  06/05/2021    Potassium monitoring  09/18/2021    Creatinine monitoring  09/18/2021    Colon cancer screen colonoscopy  10/27/2026    DTaP/Tdap/Td vaccine (3 - Td) 05/14/2029    Flu vaccine  Completed    Hepatitis C screen  Completed    Hepatitis A vaccine  Aged Out    Hib vaccine  Aged Out    Meningococcal (ACWY) vaccine  Aged Out    Pneumococcal 0-64 years Vaccine  Aged Out       CAD/CABG  S/P CABG  + CP, no SOB, no orthopnea/PND/LE edema  Following with cards - switching to Dr. Abi Lucas at [address] insurance - will need new cardiologist      NAFLD  Liver enzymes were up blood work 2/24/17  He is working on losing weight and changing his diet  MRI 5/2/17: FOCAL AREA OF FATTY SPARING IN THE MEDIAL LEFT HEPATIC LOBE NEAR THE FALCIFORM LIGAMENT CONSISTENT WITH A HEPATIC PSEUDO-LESION. OTHERWISE NEGATIVE MRI OF THE ABDOMEN. Adjustment disorder with depressed mood  Lost his mother   Had not had insurance for a while and had to stop all his meds  When he stopped his insulin - he lost a lot of weight   Now does not want to get back on insulin - but sugars are VERY high  Still has good days and bad days  Would like to try something not one with ED    2-3 weeks prior to covid sharp increase in cases pt was very sick and curious if he had covid     Review of Systems   Constitutional: Negative for activity change, appetite change and chills. HENT: Negative for congestion, dental problem and drooling. Eyes: Negative for pain, discharge and itching. Respiratory: Negative for apnea, choking and chest tightness. Physical Exam    Nursing note reviewed. Constitutional:       General: no acute distress. Appearance: Normal appearance. normal weight. not ill-appearing, toxic-appearing or diaphoretic. HENT:      Head: Normocephalic and atraumatic. Right Ear: External ear normal.      Left Ear: External ear normal.      Nose: Nose normal.   Eyes:      General: No scleral icterus. Right eye: No discharge. Left eye: No discharge. Extraocular Movements: Extraocular movements intact. Conjunctiva/sclera: Conjunctivae normal.   Neck:      Musculoskeletal: Normal range of motion.    Pulmonary: Date:   12/8/2021     Scheduling Instructions:      CDC does not recommend using antibody testing to diagnose acute infection. It is recommended to use a direct viral detection test to diagnose acute infection. (COVID-19 [facility] [id number])            Antibody tests are not 100% accurate, and some false-positive results or false-negative results may occur. A positive result may not ensure immunity from reinfection. Per CDC, positive or negative results do not confirm whether a patient is able to spread the virus that causes COVID-19     Order Specific Question:   Symptomatic for COVID-19 as defined by CDC? Answer:   Yes     Order Specific Question:   Date of Symptom Onset     Answer:   4/1/2020     Order Specific Question:   Hospitalized for COVID-19? Answer:   No     Order Specific Question:   Admitted to ICU for COVID-19? Answer:   No     Order Specific Question:   Employed in healthcare setting? Answer:   No     Order Specific Question:   Resident in a congregate (group) care setting? Answer:   No     Order Specific Question:   Pregnant: Answer:   No     Order Specific Question:   Previously tested for COVID-19? Answer:   No      I personally reviewed all recent labs and imaging pertaining to conditions mentioned above in assessment/plan in [facility] and care everywhere, discussed results with patient    Diabetes Counseling   Patient was counseled regarding disease risks and adopting healthy behaviors. Patient was provided education materials to assist with self management. Patient was provided log (or received log during previous visit) to record blood pressure, food intake and/or blood sugar. Patient was instructed to keep log up-to-date and to always bring log to all office visits. Reviewed the following:  - Morbidity from diabetes involves both macrovascular (atherosclerosis) and microvascular disease (retinopathy, nephropathy, and neuropathy).      - Monitoring recommendations for patients with diabetes were discussed  1. Smoking cessation counseling (Every visit)   2. Blood pressure (Every visit) Goal <140/80   3. Dilated eye examination (Annually, more than annually if significant retinopathy)   4. Foot examination (Annually, every visit if peripheral vascular disease or neuropathy)   5. Laboratory studies:    - Fasting serum lipid profile (Annually)  cholesterol (goal LDL less than 100  mg/dL    - A1C (Every three to six months) Goal <7 percent   - Urinary albumin-to-creatinine ratio (Annually, protein excretion and serum  creatinine should also be monitored if persistent albuminuria is present)    - Serum creatinine, initially as indicated  6. ASA (75 to 162 mg/day) in patients with or at high risk for cardiovascular disease  7. Vaccinations:   - Pneumococcus, One time Patients over age 72 need a second dose if vaccine  was received ? 5 years previously and age was <65 at time of vaccination    - Influenza, Annually    - Hepatitis B, They should have had the three dose series   8. Education, self management review Annually      Return in about 6 months (around 6/8/2021) for AWV.

## 2024-01-22 NOTE — TELEPHONE ENCOUNTER
----- Message from Dionne Greene RN sent at 1/22/2024 11:04 AM CST -----  Regarding: surgery consult per Dr. Jhonathan Valerio Ladies,  Just wanted to let you know this patient is over in [facility] post-cath and Dr. Ring would like patient to be scheduled for a CT surgery consultation for 3 vessel CAD. He also mentioned he'd like us to discuss at our upcoming valve conference with the surgeons which I will take care of.     Thanks,  Mustapha Greene RN BSN  Structural Heart Coordinator   [facility]  [phone]

## 2024-01-22 NOTE — PLAN OF CARE
Goal Outcome Evaluation:         Pt admitted for Right Heart Cath, CA/P.PCI for evaluation of his aortic stenosis. Pt prepped and ready for procedure.      Neris Mace RN

## 2024-01-22 NOTE — DISCHARGE INSTRUCTIONS

## 2024-01-22 NOTE — INTERVAL H&P NOTE
"I have reviewed the surgical (or preoperative) H&P that is linked to this encounter, and examined the patient. There are no significant changes    Clinical Conditions Present on Arrival:  Clinically Significant Risk Factors Present on Admission                 # Drug Induced Platelet Defect: home medication list includes an antiplatelet medication  # Obesity: Estimated body mass index is 33.29 kg/m  as calculated from the following:    Height as of this encounter: 1.778 m (5' 10\").    Weight as of this encounter: 105.2 kg (232 lb).       "

## 2024-01-22 NOTE — PROGRESS NOTES
Message received from provider Dr. Ring. Patient has 3 vessel CAD, provider recommends patient be evaluated by CT surgery group and be discussed at upcoming valve conference. Patient added to valve conference list for next week. Msg sent to CT surgery scheduler/RN to notify of incoming consultation and arrange follow up appt with one of the surgeons.     Dionne Greene RN on 1/22/2024 at 11:04 AM

## 2024-01-22 NOTE — PRE-PROCEDURE
GENERAL PRE-PROCEDURE:   Procedure:  Coronary angiogram, possible percutaneous coronary intervention, right heart catheterization  Date/Time:  1/22/2024 8:28 AM    Written consent obtained?: Yes    Risks and benefits: Risks, benefits and alternatives were discussed    Consent given by:  Patient  Patient states understanding of procedure being performed: Yes    Patient's understanding of procedure matches consent: Yes    Procedure consent matches procedure scheduled: Yes    Expected level of sedation:  Moderate  Appropriately NPO:  Yes  ASA Class:  3 (Rheumatic aortic stenosis, HTN, hypercholesterolemia, class I obesity; 33.29 kg/m , vertigo)  Mallampati  :  Grade 3- soft palate visible, posterior pharyngeal wall not visible  Lungs:  Lungs clear with good breath sounds bilaterally  Heart:  Systolic murmur and other (comment)  Heart exam comment:  Distant S1, S2  History & Physical reviewed:  History and physical reviewed and updates made (see comment)  H&P Comments:  Clinically Significant Risk Factors Present on Admission    Cardiovascular : Rheumatic aortic stenosis, HTN, hypercholesterolemia, class I obesity; 33.29 kg/m     Fluid & Electrolyte Disorders : Not present on admission    Gastroenterology : Not present on admission    Hematology/Oncology : Not present on admission    Nephrology : Not present on admission    Neurology : Vertigo      Pulmonology : Not present on admission    Systemic : Not present on admission  Statement of review:  I have reviewed the lab findings, diagnostic data, medications, and the plan for sedation      Sedation and Procedure Attestation:    I attest that Nick Zaldivar underwent a pre-sedation assessment and is an appropriate candidate to undergo the planned procedure and planned sedation. Risks, benefits, and alternatives were discussed. The patient/representative was given an opportunity to ask questions and seems to understand. Informed consent was obtained for the procedure,  including sedation.    The patient was re-evaluated immediately prior to sedation administration.    Harman Ring MD  Interventional Cardiology

## 2024-01-24 ENCOUNTER — PREP FOR PROCEDURE (OUTPATIENT)
Dept: ADMINISTRATIVE | Facility: CLINIC | Age: 79
End: 2024-01-24

## 2024-01-24 ENCOUNTER — OFFICE VISIT (OUTPATIENT)
Dept: CARDIOLOGY | Facility: CLINIC | Age: 79
End: 2024-01-24
Attending: NURSE PRACTITIONER
Payer: MEDICARE

## 2024-01-24 VITALS
RESPIRATION RATE: 16 BRPM | WEIGHT: 235 LBS | SYSTOLIC BLOOD PRESSURE: 124 MMHG | DIASTOLIC BLOOD PRESSURE: 52 MMHG | HEART RATE: 72 BPM | HEIGHT: 70 IN | BODY MASS INDEX: 33.64 KG/M2

## 2024-01-24 DIAGNOSIS — I25.10 CORONARY ARTERY DISEASE DUE TO LIPID RICH PLAQUE: Primary | ICD-10-CM

## 2024-01-24 DIAGNOSIS — I25.83 CORONARY ARTERY DISEASE DUE TO LIPID RICH PLAQUE: Primary | ICD-10-CM

## 2024-01-24 DIAGNOSIS — I25.83 CORONARY ARTERY DISEASE DUE TO LIPID RICH PLAQUE: ICD-10-CM

## 2024-01-24 DIAGNOSIS — I25.10 CORONARY ARTERY DISEASE DUE TO LIPID RICH PLAQUE: ICD-10-CM

## 2024-01-24 PROCEDURE — 99203 OFFICE O/P NEW LOW 30 MIN: CPT | Performed by: THORACIC SURGERY (CARDIOTHORACIC VASCULAR SURGERY)

## 2024-01-24 NOTE — PATIENT INSTRUCTIONS
You were seen today in the [facility]    Surgery will be scheduled on Wednesday, February 14 and Pre-admission testing will be on Tuesday, February 6 at 8:00 AM.    Please call NIRAJ Lopez surgery coordinator with any questions.  Thank you.    Jessica Koroma RNCC  Cardiovascular Surgery  Phone [phone]  Fax [phone]

## 2024-01-24 NOTE — LETTER
2024    [facility]  [address] [facility] [address]    RE: Nick Carrascoer       Dear Colleague,     I had the pleasure of seeing Nick Zaldivar in the [facility].  ASKED BY REFERRING PHYSICIAN: Dr. Ring to evaluate this patient for coronary artery bypass grafting and aortic valve replacement.    CHIEF COMPLAINT: Dyspnea on exertion.    HPI: Nick is a 78 year old male who presents with dyspnea on exertion.  He has gained 20 to 30 pounds over the course of the last year.  He has moderate aortic stenosis with a mean gradient of 23 mmHg and a calculated valve area of 1.2 cm squared.  He has heart failure with preserved LV ejection fraction. He was found to have triple vessel coronary artery disease.    PAST MEDICAL HISTORY:  Past Medical History:   Diagnosis Date    Aortic stenosis     Coronary artery disease     Hypertension        PAST SURGICAL HISTORY:  Past Surgical History:   Procedure Laterality Date    CV CORONARY ANGIOGRAM N/A 2024    Procedure: Coronary Angiogram;  Surgeon: Harman Ring MD;  Location: [facility] CATH LAB CV    CV RIGHT HEART CATH MEASUREMENTS RECORDED N/A 2024    Procedure: Right Heart Catheterization;  Surgeon: Harman Ring MD;  Location: [facility] CATH LAB CV       FAMILY HISTORY:   No family history on file.    SOCIAL HISTORY:  Social History     Socioeconomic History    Marital status:      Spouse name: Not on file    Number of children: Not on file    Years of education: Not on file    Highest education level: Not on file   Occupational History    Not on file   Tobacco Use    Smoking status: Former     Types: Cigarettes     Quit date: 1978     Years since quittin.0    Smokeless tobacco: Never   Substance and Sexual Activity    Alcohol use: Not Currently    Drug use: Never    Sexual activity: Not on file   Other Topics Concern    Not on file   Social History Narrative    Not on file     Social  Determinants of Health     Financial Resource Strain: Not on file   Food Insecurity: Not on file   Transportation Needs: Not on file   Physical Activity: Not on file   Stress: Not on file   Social Connections: Not on file   Interpersonal Safety: Not on file   Housing Stability: Not on file        ALLERGIES:   Allergies   Allergen Reactions    Bee Venom      Other Reaction(s): Edema       CURRENT MEDICATIONS:   Prescription Medications as of 1/24/2024         Rx Number Disp Refills Start End Last Dispensed Date Next Fill Date Owning Pharmacy    aspirin 81 MG EC tablet  -- -- 1/15/2020 --       Sig: [ASPIRIN 81 MG EC TABLET] Take 81 mg by mouth daily.    Class: Historical    Route: Oral    furosemide (LASIX) 20 MG tablet  60 tablet 11 1/22/2024 --   [facility] [address] [place] [address]    Sig: Take 2 tablets (40 mg) by mouth 2 times daily    Class: E-Prescribe    Notes to Pharmacy: Take 40mg by mouth every morning and 40mg by mouth every afternoon at 2:00PM    Route: Oral    isosorbide mononitrate (IMDUR) 30 MG 24 hr tablet  30 tablet 3 1/22/2024 --   [facility] [address] [place] [address]    Sig: Take 1 tablet (30 mg) by mouth daily    Class: E-Prescribe    Route: Oral    lisinopril (ZESTRIL) 40 MG tablet  -- -- 12/23/2021 --   [id number] [place] [id number] [facility]  AT [facility]    Sig: Take 40 mg by mouth daily    Class: Historical    Route: Oral    metoprolol tartrate (LOPRESSOR) 25 MG tablet  90 tablet 3 7/29/2022 --   [facility] - [place] [address] [address] [facility]    Sig: Take 0.5 tablets (12.5 mg) by mouth 2 times daily    Class: E-Prescribe    Route: Oral    nitroGLYcerin (NITROSTAT) 0.4 MG sublingual tablet  -- -- 9/18/2021 --   [facility] - [place] [place] [facility] [address] [facility] [address] [facility]    Sig: See Admin Instructions    Class: Historical    rosuvastatin (CRESTOR) 40 MG tablet   "90 tablet 3 12/14/2023 --   [facility] - [place], [place] - [address]    Sig: TAKE 1 TABLET BY MOUTH DAILY    Class: E-Prescribe    Notes to Pharmacy: Please send a replace/new response with 90-Day Supply if appropriate to maximize member benefit. Requesting 1 year supply.            REVIEW OF SYSTEMS:   Gen: denies frequent headaches, double/blurry vision, insomnia, fatigue, unexplained weight loss/gain. No previous anesthesia reactions.  CV: CARRILLO, denies chest pain,denies palpitations, peripheral edema.   Pulm: denies shortness of breath, asthma or wheezing  GI/: denies liver or kidney problems, blood in stool or BRBPR, difficulty urinating  Endo: denies thyroid problems or Diabetes  Heme/Onc: denies bleeding or clotting disorders, no family problems with bleeding/clotting diorders  MS: no weakness, tremors or gait instability  Neuro: denies depression, memory problems, no dysesthesias, no previous strokes, no migraines, no dysphagia  Skin: No petechiae, purpura or rash.    PHYSICAL EXAMINATION:   /52 (BP Location: Left arm, Patient Position: Sitting, Cuff Size: Adult Large)   Pulse 72   Resp 16   Ht 1.778 m (5' 10\")   Wt 106.6 kg (235 lb)   BMI 33.72 kg/m    General: alert and oriented x 3, pleasant, no acute distress  CV: S1 S2, grade II/VI systolic ejection fraction, no rubs or gallops, regular rate and rhythm, no peripheral edema, no carotid or abdominal bruits, pulses in upper and lower extremities palpable  Pulm: bilateral breath sounds, clear to auscultation, easy work of breathing  GI: (+) bowel sounds, soft non-tender and non-distended  : voiding without problems  MS: moves all extremities x 4,  5+/5+ equal strength bilaterally  Neuro: pupils equal round and reactive to light, cranial nerves, II-XII grossly intact, no gross neurologic deficits noted    LABS:  BMP RESULTS:  Lab Results   Component Value Date     01/22/2024    POTASSIUM 4.0 01/22/2024    POTASSIUM 4.8 " "02/22/2022    CHLORIDE 108 (H) 01/22/2024    CHLORIDE 101 02/22/2022    CO2 26 01/22/2024    CO2 27 02/22/2022    ANIONGAP 9 01/22/2024    ANIONGAP 11 02/22/2022     (H) 01/22/2024    GLC 93 02/22/2022    BUN 20.1 01/22/2024    BUN 19 02/22/2022    CR 0.96 01/22/2024    GFRESTIMATED 80 01/22/2024    GFRESTIMATED >60 04/05/2021    GFRESTBLACK >60 04/05/2021    BUBBA 8.8 01/22/2024        CBC RESULTS:  Lab Results   Component Value Date    WBC 6.5 01/22/2024    RBC 4.66 01/22/2024    HGB 13.9 01/22/2024    HCT 42.2 01/22/2024    MCV 91 01/22/2024    MCH 29.8 01/22/2024    MCHC 32.9 01/22/2024    RDW 12.1 01/22/2024     01/22/2024       No results found for: \"INR\"  No results found for: \"PTT\"  No results found for: \"UA\"  No results found for: \"A1C\"    PROCEDURES/IMAGING:  Chest X-Ray: Heart size is WNL. Some atelectasis left lung base  Angio: Triple vessel coronary artery disease  Echo: Moderate AS  CT: Ascending aorta is 4.2 cm. Scattered aortic atherosclerosis  MRI: Not done  Carotid US: Mild plaque bilaterally     ASSESSMENT/PLAN:   Nick Santosis a 78 year old male who presents with dyspnea on exertion and was found to have moderate aortic stenosis and triple vessel coronary artery disease.  We plan to perform a multi-vessel coronary artery bypass grafting procedure and will also replace his aortic valve.  For conduit we will use the left internal mamary artery and reversed saphenous vein graft.  The patient and his daughter understand that the risks for these procedures include: bleeding, infection, stroke, sternal dehiscence, myocardial infarction, arrhythmias, the need for a pacemaker, prolonged ventilation, pneumonia, liver/renal failure, aortic dissection, and an operative mortality of 2 to 4 percent.  He accepts these risks and surgery will be scheduled in early February.    1. Complete outpatient work-up  2. AVR and CABG x 3 February 8, 2024.       Approximately 30 minutes were spent with the patient " in clinic at this visit.    CC  Patient Care Team:  Clinic, [facility] as PCP - General  Jas Hope MD as Assigned Heart and Vascular Provider  SARAHI TELLEZ    Thank you for allowing me to participate in the care of your patient.      Sincerely,     Carmelina Flanagan MD   [facility]  cc:   Sarahi Tellez, CNP  [address] [place] [address]

## 2024-01-24 NOTE — PROGRESS NOTES
ASKED BY REFERRING PHYSICIAN: Dr. Ring to evaluate this patient for coronary artery bypass grafting and aortic valve replacement.    CHIEF COMPLAINT: Dyspnea on exertion.    HPI: Nick is a 78 year old male who presents with dyspnea on exertion.  He has gained 20 to 30 pounds over the course of the last year.  He has moderate aortic stenosis with a mean gradient of 23 mmHg and a calculated valve area of 1.2 cm squared.  He has heart failure with preserved LV ejection fraction. He was found to have triple vessel coronary artery disease.    PAST MEDICAL HISTORY:  Past Medical History:   Diagnosis Date    Aortic stenosis     Coronary artery disease     Hypertension        PAST SURGICAL HISTORY:  Past Surgical History:   Procedure Laterality Date    CV CORONARY ANGIOGRAM N/A 2024    Procedure: Coronary Angiogram;  Surgeon: Harman Ring MD;  Location: [facility] CATH LAB CV    CV RIGHT HEART CATH MEASUREMENTS RECORDED N/A 2024    Procedure: Right Heart Catheterization;  Surgeon: Harman Ring MD;  Location: [facility] CATH LAB CV       FAMILY HISTORY:   No family history on file.    SOCIAL HISTORY:  Social History     Socioeconomic History    Marital status:      Spouse name: Not on file    Number of children: Not on file    Years of education: Not on file    Highest education level: Not on file   Occupational History    Not on file   Tobacco Use    Smoking status: Former     Types: Cigarettes     Quit date: 1978     Years since quittin.0    Smokeless tobacco: Never   Substance and Sexual Activity    Alcohol use: Not Currently    Drug use: Never    Sexual activity: Not on file   Other Topics Concern    Not on file   Social History Narrative    Not on file     Social Determinants of Health     Financial Resource Strain: Not on file   Food Insecurity: Not on file   Transportation Needs: Not on file   Physical Activity: Not on file   Stress: Not on file   Social Connections: Not on file    Interpersonal Safety: Not on file   Housing Stability: Not on file        ALLERGIES:   Allergies   Allergen Reactions    Bee Venom      Other Reaction(s): Edema       CURRENT MEDICATIONS:   Prescription Medications as of 1/24/2024         Rx Number Disp Refills Start End Last Dispensed Date Next Fill Date Owning Pharmacy    aspirin 81 MG EC tablet  -- -- 1/15/2020 --       Sig: [ASPIRIN 81 MG EC TABLET] Take 81 mg by mouth daily.    Class: Historical    Route: Oral    furosemide (LASIX) 20 MG tablet  60 tablet 11 1/22/2024 --   [facility] [address] [place] [address]    Sig: Take 2 tablets (40 mg) by mouth 2 times daily    Class: E-Prescribe    Notes to Pharmacy: Take 40mg by mouth every morning and 40mg by mouth every afternoon at 2:00PM    Route: Oral    isosorbide mononitrate (IMDUR) 30 MG 24 hr tablet  30 tablet 3 1/22/2024 --   [facility] [address] [place] [address]    Sig: Take 1 tablet (30 mg) by mouth daily    Class: E-Prescribe    Route: Oral    lisinopril (ZESTRIL) 40 MG tablet  -- -- 12/23/2021 --   [id number] [place] [address] [address] AT [facility]    Sig: Take 40 mg by mouth daily    Class: Historical    Route: Oral    metoprolol tartrate (LOPRESSOR) 25 MG tablet  90 tablet 3 7/29/2022 --   [facility] - [place], [place] - [address]    Sig: Take 0.5 tablets (12.5 mg) by mouth 2 times daily    Class: E-Prescribe    Route: Oral    nitroGLYcerin (NITROSTAT) 0.4 MG sublingual tablet  -- -- 9/18/2021 --   [facility] - [place], [place] - [address]    Sig: See Admin Instructions    Class: Historical    rosuvastatin (CRESTOR) 40 MG tablet  90 tablet 3 12/14/2023 --   [facility] - [address]    Sig: TAKE 1 TABLET BY MOUTH DAILY    Class: E-Prescribe    Notes to Pharmacy: Please send a replace/new response with 90-Day  "Supply if appropriate to maximize member benefit. Requesting 1 year supply.            REVIEW OF SYSTEMS:   Gen: denies frequent headaches, double/blurry vision, insomnia, fatigue, unexplained weight loss/gain. No previous anesthesia reactions.  CV: CARRILLO, denies chest pain,denies palpitations, peripheral edema.   Pulm: denies shortness of breath, asthma or wheezing  GI/: denies liver or kidney problems, blood in stool or BRBPR, difficulty urinating  Endo: denies thyroid problems or Diabetes  Heme/Onc: denies bleeding or clotting disorders, no family problems with bleeding/clotting diorders  MS: no weakness, tremors or gait instability  Neuro: denies depression, memory problems, no dysesthesias, no previous strokes, no migraines, no dysphagia  Skin: No petechiae, purpura or rash.    PHYSICAL EXAMINATION:   /52 (BP Location: Left arm, Patient Position: Sitting, Cuff Size: Adult Large)   Pulse 72   Resp 16   Ht 1.778 m (5' 10\")   Wt 106.6 kg (235 lb)   BMI 33.72 kg/m    General: alert and oriented x 3, pleasant, no acute distress  CV: S1 S2, grade II/VI systolic ejection fraction, no rubs or gallops, regular rate and rhythm, no peripheral edema, no carotid or abdominal bruits, pulses in upper and lower extremities palpable  Pulm: bilateral breath sounds, clear to auscultation, easy work of breathing  GI: (+) bowel sounds, soft non-tender and non-distended  : voiding without problems  MS: moves all extremities x 4,  5+/5+ equal strength bilaterally  Neuro: pupils equal round and reactive to light, cranial nerves, II-XII grossly intact, no gross neurologic deficits noted    LABS:  BMP RESULTS:  Lab Results   Component Value Date     01/22/2024    POTASSIUM 4.0 01/22/2024    POTASSIUM 4.8 02/22/2022    CHLORIDE 108 (H) 01/22/2024    CHLORIDE 101 02/22/2022    CO2 26 01/22/2024    CO2 27 02/22/2022    ANIONGAP 9 01/22/2024    ANIONGAP 11 02/22/2022     (H) 01/22/2024    GLC 93 02/22/2022    BUN " "20.1 01/22/2024    BUN 19 02/22/2022    CR 0.96 01/22/2024    GFRESTIMATED 80 01/22/2024    GFRESTIMATED >60 04/05/2021    GFRESTBLACK >60 04/05/2021    BUBBA 8.8 01/22/2024        CBC RESULTS:  Lab Results   Component Value Date    WBC 6.5 01/22/2024    RBC 4.66 01/22/2024    HGB 13.9 01/22/2024    HCT 42.2 01/22/2024    MCV 91 01/22/2024    MCH 29.8 01/22/2024    MCHC 32.9 01/22/2024    RDW 12.1 01/22/2024     01/22/2024       No results found for: \"INR\"  No results found for: \"PTT\"  No results found for: \"UA\"  No results found for: \"A1C\"    PROCEDURES/IMAGING:  Chest X-Ray: Heart size is WNL. Some atelectasis left lung base  Angio: Triple vessel coronary artery disease  Echo: Moderate AS  CT: Ascending aorta is 4.2 cm. Scattered aortic atherosclerosis  MRI: Not done  Carotid US: Mild plaque bilaterally     ASSESSMENT/PLAN:   Nick Santosis a 78 year old male who presents with dyspnea on exertion and was found to have moderate aortic stenosis and triple vessel coronary artery disease.  We plan to perform a multi-vessel coronary artery bypass grafting procedure and will also replace his aortic valve.  For conduit we will use the left internal mamary artery and reversed saphenous vein graft.  The patient and his daughter understand that the risks for these procedures include: bleeding, infection, stroke, sternal dehiscence, myocardial infarction, arrhythmias, the need for a pacemaker, prolonged ventilation, pneumonia, liver/renal failure, aortic dissection, and an operative mortality of 2 to 4 percent.  He accepts these risks and surgery will be scheduled in early February.    1. Complete outpatient work-up  2. AVR and CABG x 3 February 8, 2024.       Approximately 30 minutes were spent with the patient in clinic at this visit.    CC  Patient Care Team:  [facility], [facility] as PCP - Jas Simon MD as Assigned Heart and Vascular Provider  SARAHI TELLEZ"

## 2024-01-25 DIAGNOSIS — I06.0 RHEUMATIC AORTIC STENOSIS: ICD-10-CM

## 2024-01-25 DIAGNOSIS — I99.8 OTHER DISORDER OF CIRCULATORY SYSTEM: ICD-10-CM

## 2024-01-25 DIAGNOSIS — R79.89 OTHER SPECIFIED ABNORMAL FINDINGS OF BLOOD CHEMISTRY: ICD-10-CM

## 2024-01-25 DIAGNOSIS — I25.10 CORONARY ARTERY DISEASE DUE TO LIPID RICH PLAQUE: Primary | ICD-10-CM

## 2024-01-25 DIAGNOSIS — I25.83 CORONARY ARTERY DISEASE DUE TO LIPID RICH PLAQUE: Primary | ICD-10-CM

## 2024-01-25 RX ORDER — CHLORHEXIDINE GLUCONATE ORAL RINSE 1.2 MG/ML
10 SOLUTION DENTAL ONCE
Status: CANCELLED | OUTPATIENT
Start: 2024-02-14 | End: 2024-01-25

## 2024-01-25 RX ORDER — ASPIRIN 81 MG/1
81 TABLET, CHEWABLE ORAL
Status: CANCELLED | OUTPATIENT
Start: 2024-02-14

## 2024-01-25 RX ORDER — ASPIRIN 81 MG/1
162 TABLET, CHEWABLE ORAL
Status: CANCELLED | OUTPATIENT
Start: 2024-02-14

## 2024-01-25 RX ORDER — PHENYLEPHRINE HCL IN 0.9% NACL 50MG/250ML
.1-6 PLASTIC BAG, INJECTION (ML) INTRAVENOUS CONTINUOUS
Status: CANCELLED | OUTPATIENT
Start: 2024-02-14

## 2024-01-25 RX ORDER — DEXMEDETOMIDINE HYDROCHLORIDE 4 UG/ML
.2-1.2 INJECTION, SOLUTION INTRAVENOUS CONTINUOUS
Status: CANCELLED | OUTPATIENT
Start: 2024-02-14

## 2024-01-25 RX ORDER — SODIUM CHLORIDE, SODIUM GLUCONATE, SODIUM ACETATE, POTASSIUM CHLORIDE AND MAGNESIUM CHLORIDE 526; 502; 368; 37; 30 MG/100ML; MG/100ML; MG/100ML; MG/100ML; MG/100ML
1000 INJECTION, SOLUTION INTRAVENOUS
Status: CANCELLED | OUTPATIENT
Start: 2024-02-14 | End: 2024-02-14

## 2024-01-25 RX ORDER — CEFAZOLIN SODIUM/WATER 2 G/20 ML
2 SYRINGE (ML) INTRAVENOUS
Status: CANCELLED | OUTPATIENT
Start: 2024-02-14

## 2024-01-25 RX ORDER — LIDOCAINE 40 MG/G
CREAM TOPICAL
Status: CANCELLED | OUTPATIENT
Start: 2024-01-25

## 2024-01-25 RX ORDER — CEFAZOLIN SODIUM/WATER 2 G/20 ML
2 SYRINGE (ML) INTRAVENOUS SEE ADMIN INSTRUCTIONS
Status: CANCELLED | OUTPATIENT
Start: 2024-02-14

## 2024-01-29 ENCOUNTER — DOCUMENTATION ONLY (OUTPATIENT)
Dept: OTHER | Facility: CLINIC | Age: 79
End: 2024-01-29
Payer: MEDICARE

## 2024-02-06 ENCOUNTER — HOSPITAL ENCOUNTER (OUTPATIENT)
Dept: SURGERY | Facility: HOSPITAL | Age: 79
Discharge: HOME | End: 2024-02-06
Attending: THORACIC SURGERY (CARDIOTHORACIC VASCULAR SURGERY) | Admitting: THORACIC SURGERY (CARDIOTHORACIC VASCULAR SURGERY)
Payer: MEDICARE

## 2024-02-06 ENCOUNTER — ANESTHESIA EVENT (OUTPATIENT)
Dept: SURGERY | Facility: HOSPITAL | Age: 79
DRG: 236 | End: 2024-02-06
Payer: MEDICARE

## 2024-02-06 VITALS
SYSTOLIC BLOOD PRESSURE: 116 MMHG | OXYGEN SATURATION: 96 % | HEIGHT: 70 IN | RESPIRATION RATE: 16 BRPM | HEART RATE: 55 BPM | TEMPERATURE: 97.5 F | DIASTOLIC BLOOD PRESSURE: 60 MMHG | WEIGHT: 232.2 LBS | BODY MASS INDEX: 33.24 KG/M2

## 2024-02-06 DIAGNOSIS — I25.83 CORONARY ARTERY DISEASE DUE TO LIPID RICH PLAQUE: ICD-10-CM

## 2024-02-06 DIAGNOSIS — I25.10 CORONARY ARTERY DISEASE DUE TO LIPID RICH PLAQUE: ICD-10-CM

## 2024-02-06 DIAGNOSIS — I99.8 OTHER DISORDER OF CIRCULATORY SYSTEM: ICD-10-CM

## 2024-02-06 DIAGNOSIS — R79.89 OTHER SPECIFIED ABNORMAL FINDINGS OF BLOOD CHEMISTRY: ICD-10-CM

## 2024-02-06 DIAGNOSIS — I06.0 RHEUMATIC AORTIC STENOSIS: ICD-10-CM

## 2024-02-06 LAB
ABO/RH(D): NORMAL
ALBUMIN UR-MCNC: NEGATIVE MG/DL
ANTIBODY SCREEN: NEGATIVE
APPEARANCE UR: CLEAR
APTT PPP: 31 SECONDS (ref 22–38)
BILIRUB UR QL STRIP: NEGATIVE
COLOR UR AUTO: YELLOW
GLUCOSE UR STRIP-MCNC: NEGATIVE MG/DL
HBA1C MFR BLD: 5.9 %
HGB UR QL STRIP: NEGATIVE
HYALINE CASTS: 5 /LPF
INR PPP: 1.03 (ref 0.85–1.15)
KETONES UR STRIP-MCNC: NEGATIVE MG/DL
LEUKOCYTE ESTERASE UR QL STRIP: NEGATIVE
MAGNESIUM SERPL-MCNC: 2.3 MG/DL (ref 1.7–2.3)
MUCOUS THREADS #/AREA URNS LPF: PRESENT /LPF
NITRATE UR QL: NEGATIVE
PH UR STRIP: 6 [PH] (ref 5–7)
PREALB SERPL-MCNC: 22.6 MG/DL (ref 20–40)
RBC URINE: <1 /HPF
SP GR UR STRIP: 1.01 (ref 1–1.03)
SPECIMEN EXPIRATION DATE: NORMAL
UROBILINOGEN UR STRIP-MCNC: <2 MG/DL
WBC URINE: <1 /HPF

## 2024-02-06 PROCEDURE — 81001 URINALYSIS AUTO W/SCOPE: CPT | Performed by: THORACIC SURGERY (CARDIOTHORACIC VASCULAR SURGERY)

## 2024-02-06 PROCEDURE — 86900 BLOOD TYPING SEROLOGIC ABO: CPT | Performed by: THORACIC SURGERY (CARDIOTHORACIC VASCULAR SURGERY)

## 2024-02-06 PROCEDURE — 83036 HEMOGLOBIN GLYCOSYLATED A1C: CPT | Performed by: THORACIC SURGERY (CARDIOTHORACIC VASCULAR SURGERY)

## 2024-02-06 PROCEDURE — 84134 ASSAY OF PREALBUMIN: CPT | Performed by: THORACIC SURGERY (CARDIOTHORACIC VASCULAR SURGERY)

## 2024-02-06 PROCEDURE — 83735 ASSAY OF MAGNESIUM: CPT | Performed by: THORACIC SURGERY (CARDIOTHORACIC VASCULAR SURGERY)

## 2024-02-06 PROCEDURE — 85730 THROMBOPLASTIN TIME PARTIAL: CPT | Performed by: THORACIC SURGERY (CARDIOTHORACIC VASCULAR SURGERY)

## 2024-02-06 PROCEDURE — 36415 COLL VENOUS BLD VENIPUNCTURE: CPT | Performed by: THORACIC SURGERY (CARDIOTHORACIC VASCULAR SURGERY)

## 2024-02-06 PROCEDURE — 85610 PROTHROMBIN TIME: CPT | Performed by: THORACIC SURGERY (CARDIOTHORACIC VASCULAR SURGERY)

## 2024-02-06 RX ORDER — IBUPROFEN 400 MG/1
400 TABLET, FILM COATED ORAL
Status: ON HOLD | COMMUNITY
End: 2024-03-01

## 2024-02-06 RX ORDER — MULTIPLE VITAMINS W/ MINERALS TAB 9MG-400MCG
1 TAB ORAL DAILY
COMMUNITY

## 2024-02-06 NOTE — ANESTHESIA PREPROCEDURE EVALUATION
Anesthesia Pre-Procedure Evaluation    Patient: Nick Zaldivar   MRN: [id number] : 1945        Procedure : Procedure(s):  CORONARY ARTERY BYPASS GRAFT, WITH AORTIC VALVE REPLACEMENT, WITH INTERNAL MAMMARY ARTERY HARVEST, ENDOSCOPIC VESSEL PROCUREMENT,  ANESTHESIA TRANSESOPHAGEAL ECHOCARDIOGRAM          Past Medical History:   Diagnosis Date    Aortic stenosis     Coronary artery disease     Hypertension       Past Surgical History:   Procedure Laterality Date    CV CORONARY ANGIOGRAM N/A 2024    Procedure: Coronary Angiogram;  Surgeon: Harman Ring MD;  Location: [facility] CATH LAB CV    CV RIGHT HEART CATH MEASUREMENTS RECORDED N/A 2024    Procedure: Right Heart Catheterization;  Surgeon: Harman Ring MD;  Location: [facility] CATH LAB CV      Allergies   Allergen Reactions    Bee Venom      Other Reaction(s): Edema      Social History     Tobacco Use    Smoking status: Former     Types: Cigarettes     Quit date: 1978     Years since quittin.1    Smokeless tobacco: Never   Substance Use Topics    Alcohol use: Not Currently      Wt Readings from Last 1 Encounters:   24 105.3 kg (232 lb 3.2 oz)        Anesthesia Evaluation   Pt has had prior anesthetic.     No history of anesthetic complications       ROS/MED HX  ENT/Pulmonary:       Neurologic:       Cardiovascular:     (+)  hypertension- -  CAD -  - -                                      METS/Exercise Tolerance:     Hematologic:       Musculoskeletal:       GI/Hepatic:       Renal/Genitourinary:       Endo:     (+)               Obesity,       Psychiatric/Substance Use:       Infectious Disease:       Malignancy:       Other:            Physical Exam    Airway        Mallampati: II    Neck ROM: full     Respiratory Devices and Support         Dental       (+) Minor Abnormalities - some fillings, tiny chips      Cardiovascular   cardiovascular exam normal          Pulmonary   pulmonary exam normal                OUTSIDE  "LABS:  CBC:   Lab Results   Component Value Date    WBC 6.5 01/22/2024    WBC 6.7 01/11/2024    HGB 13.9 01/22/2024    HGB 15.0 01/11/2024    HCT 42.2 01/22/2024    HCT 45.1 01/11/2024     01/22/2024     01/11/2024     BMP:   Lab Results   Component Value Date     01/22/2024     01/11/2024    POTASSIUM 4.0 01/22/2024    POTASSIUM 4.0 01/11/2024    CHLORIDE 108 (H) 01/22/2024    CHLORIDE 105 01/11/2024    CO2 26 01/22/2024    CO2 26 01/11/2024    BUN 20.1 01/22/2024    BUN 21.4 01/11/2024    CR 0.96 01/22/2024    CR 0.95 01/11/2024     (H) 01/22/2024     (H) 01/11/2024     COAGS:   Lab Results   Component Value Date    PTT 31 02/06/2024    INR 1.03 02/06/2024     POC: No results found for: \"BGM\", \"HCG\", \"HCGS\"  HEPATIC:   Lab Results   Component Value Date    ALBUMIN 3.9 04/05/2021    PROTTOTAL 7.0 04/05/2021    ALT 30 04/05/2021    AST 20 04/05/2021    ALKPHOS 66 04/05/2021    BILITOTAL 0.4 04/05/2021     OTHER:   Lab Results   Component Value Date    LACT 1.2 03/26/2020    A1C 5.9 (H) 02/06/2024    BUBBA 8.8 01/22/2024    MAG 2.3 02/06/2024    TSH 0.99 02/04/2022    CRP 3.6 (H) 03/26/2020       Anesthesia Plan    ASA Status:  3       Anesthesia Type: General.     - Airway: ETT              Consents    Anesthesia Plan(s) and associated risks, benefits, and realistic alternatives discussed. Questions answered and patient/representative(s) expressed understanding.     - Discussed: Risks, Benefits and Alternatives for the PROCEDURE were discussed     - Discussed with:  Patient      - Extended Intubation/Ventilatory Support Discussed: No.      - Patient is DNR/DNI Status: No     Use of blood products discussed: No .     Postoperative Care    Pain management: Multi-modal analgesia.   PONV prophylaxis: Ondansetron (or other 5HT-3), Dexamethasone or Solumedrol     Comments:               Natasha Watters MD    I have reviewed the pertinent notes and labs in the chart from the past 30 " "days and (re)examined the patient.  Any updates or changes from those notes are reflected in this note.              # Obesity: Estimated body mass index is 33.32 kg/m  as calculated from the following:    Height as of 2/6/24: 1.778 m (5' 10\").    Weight as of 2/6/24: 105.3 kg (232 lb 3.2 oz).      "

## 2024-02-06 NOTE — OR NURSING
Left Teams message for  regarding  reddened areas bilat groins. Pt states areas have gotten much better. I just wanted to know if any practitioner needed to investigate further. Pt and his daughter needed to get back home before hearing back. I told them we will proceed as planned unless they hear differently on surgery sched for 2/14/24.

## 2024-02-06 NOTE — PHARMACY-ADMISSION MEDICATION HISTORY
Pharmacist Admission Medication History    Admission medication history is complete. The information provided in this note is only as accurate as the sources available at the time of the update.    Information Source(s): Patient and Family member via in-person    Pertinent Information: Completed med rec with help of list Hai provided. Aspirin was not on the list but patient takes daily in the morning. Furosemide recently increased to 40 mg BID .     Changes made to PTA medication list:  Added: Multivitamin,  Ibuprofen  Deleted: None  Changed: None    Medication Affordability:       Allergies reviewed with patient and updates made in EHR: yes    Medication History Completed By: BASSAM ANNE RPH 2/6/2024 8:34 AM    PTA Med List   Medication Sig Last Dose    aspirin 81 MG EC tablet [ASPIRIN 81 MG EC TABLET] Take 81 mg by mouth daily. 2/6/2024 at am    furosemide (LASIX) 20 MG tablet Take 2 tablets (40 mg) by mouth 2 times daily 2/6/2024 at am    ibuprofen (ADVIL/MOTRIN) 400 MG tablet Take 400 mg by mouth once as needed for moderate pain Past Month at prn    isosorbide mononitrate (IMDUR) 30 MG 24 hr tablet Take 1 tablet (30 mg) by mouth daily 2/6/2024 at am    lisinopril (ZESTRIL) 40 MG tablet Take 40 mg by mouth daily 2/6/2024 at am    metoprolol tartrate (LOPRESSOR) 25 MG tablet Take 0.5 tablets (12.5 mg) by mouth 2 times daily 2/6/2024 at am    multivitamin w/minerals (THERA-VIT-M) tablet Take 1 tablet by mouth daily 2/6/2024 at am    rosuvastatin (CRESTOR) 40 MG tablet TAKE 1 TABLET BY MOUTH DAILY 2/5/2024 at pm

## 2024-02-14 ENCOUNTER — HOSPITAL ENCOUNTER (INPATIENT)
Facility: HOSPITAL | Age: 79
LOS: 8 days | Discharge: NURSING, REHAB OR LONG-TERM CARE | DRG: 236 | End: 2024-02-22
Attending: THORACIC SURGERY (CARDIOTHORACIC VASCULAR SURGERY) | Admitting: THORACIC SURGERY (CARDIOTHORACIC VASCULAR SURGERY)
Payer: MEDICARE

## 2024-02-14 ENCOUNTER — APPOINTMENT (OUTPATIENT)
Dept: RADIOLOGY | Facility: HOSPITAL | Age: 79
DRG: 236 | End: 2024-02-14
Attending: NURSE PRACTITIONER
Payer: MEDICARE

## 2024-02-14 ENCOUNTER — ANESTHESIA (OUTPATIENT)
Dept: SURGERY | Facility: HOSPITAL | Age: 79
DRG: 236 | End: 2024-02-14
Payer: MEDICARE

## 2024-02-14 DIAGNOSIS — I06.0 RHEUMATIC AORTIC STENOSIS: ICD-10-CM

## 2024-02-14 DIAGNOSIS — Z95.2 S/P AVR (AORTIC VALVE REPLACEMENT): ICD-10-CM

## 2024-02-14 DIAGNOSIS — I25.10 CORONARY ARTERY DISEASE DUE TO LIPID RICH PLAQUE: ICD-10-CM

## 2024-02-14 DIAGNOSIS — Z95.1 S/P CABG (CORONARY ARTERY BYPASS GRAFT): Primary | ICD-10-CM

## 2024-02-14 DIAGNOSIS — I25.83 CORONARY ARTERY DISEASE DUE TO LIPID RICH PLAQUE: ICD-10-CM

## 2024-02-14 LAB
ALBUMIN SERPL BCG-MCNC: 3 G/DL (ref 3.5–5.2)
ALP SERPL-CCNC: 43 U/L (ref 40–150)
ALT SERPL W P-5'-P-CCNC: 30 U/L (ref 0–70)
ANION GAP SERPL CALCULATED.3IONS-SCNC: 8 MMOL/L (ref 7–15)
APTT PPP: 63 SECONDS (ref 22–38)
AST SERPL W P-5'-P-CCNC: 46 U/L (ref 0–45)
BASE EXCESS BLDA CALC-SCNC: -0.2 MMOL/L (ref -3–3)
BASE EXCESS BLDA CALC-SCNC: -0.3 MMOL/L (ref -3–3)
BASE EXCESS BLDA CALC-SCNC: -1.1 MMOL/L (ref -3–3)
BASE EXCESS BLDA CALC-SCNC: -1.3 MMOL/L (ref -3–3)
BASE EXCESS BLDA CALC-SCNC: -3.1 MMOL/L (ref -3–3)
BASE EXCESS BLDA CALC-SCNC: -9.1 MMOL/L (ref -3–3)
BASE EXCESS BLDV CALC-SCNC: 1.7 MMOL/L (ref -3–3)
BILIRUB SERPL-MCNC: 0.4 MG/DL
BLD PROD TYP BPU: NORMAL
BLD PROD TYP BPU: NORMAL
BLOOD COMPONENT TYPE: NORMAL
BLOOD COMPONENT TYPE: NORMAL
BUN SERPL-MCNC: 17.6 MG/DL (ref 8–23)
CA-I BLD-MCNC: 4 MG/DL (ref 4.4–5.2)
CA-I BLD-MCNC: 4.1 MG/DL (ref 4.4–5.2)
CA-I BLD-MCNC: 4.2 MG/DL (ref 4.4–5.2)
CA-I BLD-MCNC: 4.4 MG/DL (ref 4.4–5.2)
CA-I BLD-MCNC: 4.5 MG/DL (ref 4.4–5.2)
CA-I BLD-MCNC: 4.7 MG/DL (ref 4.4–5.2)
CA-I BLD-MCNC: 5 MG/DL (ref 4.4–5.2)
CALCIUM SERPL-MCNC: 7.5 MG/DL (ref 8.8–10.2)
CHLORIDE SERPL-SCNC: 115 MMOL/L (ref 98–107)
CODING SYSTEM: NORMAL
CODING SYSTEM: NORMAL
COHGB MFR BLD: 100 % (ref 95–96)
COHGB MFR BLD: 92.6 % (ref 95–96)
CPB POCT: NO
CREAT SERPL-MCNC: 0.95 MG/DL (ref 0.67–1.17)
CROSSMATCH: NORMAL
CROSSMATCH: NORMAL
DEPRECATED HCO3 PLAS-SCNC: 21 MMOL/L (ref 22–29)
EGFRCR SERPLBLD CKD-EPI 2021: 81 ML/MIN/1.73M2
ERYTHROCYTE [DISTWIDTH] IN BLOOD BY AUTOMATED COUNT: 12.3 % (ref 10–15)
ERYTHROCYTE [DISTWIDTH] IN BLOOD BY AUTOMATED COUNT: 12.3 % (ref 10–15)
FIBRINOGEN PPP-MCNC: 231 MG/DL (ref 170–490)
GLUCOSE BLD-MCNC: 141 MG/DL (ref 70–99)
GLUCOSE BLD-MCNC: 150 MG/DL (ref 70–99)
GLUCOSE BLD-MCNC: 96 MG/DL (ref 70–99)
GLUCOSE BLDC GLUCOMTR-MCNC: 106 MG/DL (ref 70–99)
GLUCOSE BLDC GLUCOMTR-MCNC: 112 MG/DL (ref 70–99)
GLUCOSE BLDC GLUCOMTR-MCNC: 116 MG/DL (ref 70–99)
GLUCOSE BLDC GLUCOMTR-MCNC: 124 MG/DL (ref 70–99)
GLUCOSE BLDC GLUCOMTR-MCNC: 128 MG/DL (ref 70–99)
GLUCOSE BLDC GLUCOMTR-MCNC: 129 MG/DL (ref 70–99)
GLUCOSE BLDC GLUCOMTR-MCNC: 129 MG/DL (ref 70–99)
GLUCOSE BLDC GLUCOMTR-MCNC: 130 MG/DL (ref 70–99)
GLUCOSE SERPL-MCNC: 126 MG/DL (ref 70–99)
HCO3 BLD-SCNC: 16 MMOL/L (ref 21–28)
HCO3 BLD-SCNC: 24 MMOL/L (ref 21–28)
HCO3 BLDA-SCNC: 21 MMOL/L (ref 21–28)
HCO3 BLDA-SCNC: 22 MMOL/L (ref 21–28)
HCO3 BLDA-SCNC: 23 MMOL/L (ref 21–28)
HCO3 BLDA-SCNC: 24 MMOL/L (ref 21–28)
HCO3 BLDA-SCNC: 24 MMOL/L (ref 21–28)
HCO3 BLDV-SCNC: 25 MMOL/L (ref 21–28)
HCT VFR BLD AUTO: 27 % (ref 40–53)
HCT VFR BLD AUTO: 35.8 % (ref 40–53)
HCT VFR BLD CALC: 31 % (ref 40–54)
HGB BLD-MCNC: 10.1 G/DL (ref 13.3–17.7)
HGB BLD-MCNC: 10.2 G/DL (ref 13.3–17.7)
HGB BLD-MCNC: 10.5 G/DL (ref 13.3–17.7)
HGB BLD-MCNC: 11.7 G/DL (ref 13.3–17.7)
HGB BLD-MCNC: 13.9 G/DL (ref 13.3–17.7)
HGB BLD-MCNC: 8.7 G/DL (ref 13.3–17.7)
HGB BLD-MCNC: 9.2 G/DL (ref 13.3–17.7)
HGB BLD-MCNC: 9.3 G/DL (ref 13.3–17.7)
INR PPP: 1.43 (ref 0.85–1.15)
ISSUE DATE AND TIME: NORMAL
ISSUE DATE AND TIME: NORMAL
LACTATE BLD-SCNC: 0.8 MMOL/L (ref 0.7–2)
LACTATE BLD-SCNC: 0.8 MMOL/L (ref 0.7–2)
LACTATE BLD-SCNC: 1 MMOL/L (ref 0.7–2)
LACTATE BLD-SCNC: 1.3 MMOL/L (ref 0.7–2)
LACTATE BLD-SCNC: 1.9 MMOL/L (ref 0.7–2)
LACTATE SERPL-SCNC: 1.2 MMOL/L (ref 0.7–2)
MAGNESIUM SERPL-MCNC: 3.1 MG/DL (ref 1.7–2.3)
MCH RBC QN AUTO: 29.5 PG (ref 26.5–33)
MCH RBC QN AUTO: 29.5 PG (ref 26.5–33)
MCHC RBC AUTO-ENTMCNC: 32.2 G/DL (ref 31.5–36.5)
MCHC RBC AUTO-ENTMCNC: 32.7 G/DL (ref 31.5–36.5)
MCV RBC AUTO: 90 FL (ref 78–100)
MCV RBC AUTO: 92 FL (ref 78–100)
O2/TOTAL GAS SETTING VFR VENT: 30 %
O2/TOTAL GAS SETTING VFR VENT: 80 %
OXYHGB MFR BLDA: 98 % (ref 92–100)
OXYHGB MFR BLDA: 99 % (ref 92–100)
OXYHGB MFR BLDV: 84 % (ref 70–75)
PCO2 BLD: 27 MM HG (ref 35–45)
PCO2 BLD: 43 MM HG (ref 35–45)
PCO2 BLDA: 37 MM HG (ref 35–45)
PCO2 BLDA: 40 MM HG (ref 35–45)
PCO2 BLDA: 43 MM HG (ref 35–45)
PCO2 BLDA: 46 MM HG (ref 35–45)
PCO2 BLDA: 47 MM HG (ref 35–45)
PCO2 BLDV: 54 MM HG (ref 40–50)
PEEP: 5 CM H2O
PEEP: 5 CM H2O
PH BLD: 7.37 [PH] (ref 7.35–7.45)
PH BLD: 7.38 [PH] (ref 7.35–7.45)
PH BLDA: 7.33 [PH] (ref 7.35–7.45)
PH BLDA: 7.34 [PH] (ref 7.35–7.45)
PH BLDA: 7.34 [PH] (ref 7.35–7.45)
PH BLDA: 7.36 [PH] (ref 7.35–7.45)
PH BLDA: 7.4 [PH] (ref 7.35–7.45)
PH BLDV: 7.32 [PH] (ref 7.32–7.43)
PHOSPHATE SERPL-MCNC: 2.9 MG/DL (ref 2.5–4.5)
PLATELET # BLD AUTO: 152 10E3/UL (ref 150–450)
PLATELET # BLD AUTO: 174 10E3/UL (ref 150–450)
PO2 BLD: 222 MM HG (ref 80–105)
PO2 BLD: 64 MM HG (ref 80–105)
PO2 BLDA: 126 MM HG (ref 80–105)
PO2 BLDA: 182 MM HG (ref 80–105)
PO2 BLDA: 374 MM HG (ref 80–105)
PO2 BLDA: 379 MM HG (ref 80–105)
PO2 BLDA: 385 MM HG (ref 80–105)
PO2 BLDV: 52 MM HG (ref 25–47)
POTASSIUM BLD-SCNC: 3.8 MMOL/L (ref 3.4–5.3)
POTASSIUM BLD-SCNC: 3.9 MMOL/L (ref 3.4–5.3)
POTASSIUM BLD-SCNC: 4.1 MMOL/L (ref 3.4–5.3)
POTASSIUM BLD-SCNC: 4.8 MMOL/L (ref 3.4–5.3)
POTASSIUM BLD-SCNC: 4.8 MMOL/L (ref 3.4–5.3)
POTASSIUM BLD-SCNC: 4.9 MMOL/L (ref 3.4–5.3)
POTASSIUM SERPL-SCNC: 4.4 MMOL/L (ref 3.4–5.3)
POTASSIUM SERPL-SCNC: 4.4 MMOL/L (ref 3.4–5.3)
PROT SERPL-MCNC: 4.7 G/DL (ref 6.4–8.3)
RBC # BLD AUTO: 2.95 10E6/UL (ref 4.4–5.9)
RBC # BLD AUTO: 3.96 10E6/UL (ref 4.4–5.9)
SAO2 % BLDA: 100 % (ref 92–100)
SAO2 % BLDA: 100 % (ref 95–96)
SAO2 % BLDA: 91 % (ref 92–100)
SAO2 % BLDA: 99 % (ref 95–96)
SAO2 % BLDA: >99 % (ref 92–100)
SAO2 % BLDV: 85 % (ref 70–75)
SODIUM BLD-SCNC: 138 MMOL/L (ref 135–145)
SODIUM BLD-SCNC: 140 MMOL/L (ref 135–145)
SODIUM BLD-SCNC: 141 MMOL/L (ref 135–145)
SODIUM BLD-SCNC: 141 MMOL/L (ref 135–145)
SODIUM BLD-SCNC: 143 MMOL/L (ref 135–145)
SODIUM BLD-SCNC: 144 MMOL/L (ref 135–145)
SODIUM SERPL-SCNC: 144 MMOL/L (ref 135–145)
UNIT ABO/RH: NORMAL
UNIT ABO/RH: NORMAL
UNIT NUMBER: NORMAL
UNIT NUMBER: NORMAL
UNIT STATUS: NORMAL
UNIT STATUS: NORMAL
UNIT TYPE ISBT: 5100
UNIT TYPE ISBT: 5100
WBC # BLD AUTO: 15.8 10E3/UL (ref 4–11)
WBC # BLD AUTO: 19.9 10E3/UL (ref 4–11)

## 2024-02-14 PROCEDURE — B24BZZ4 ULTRASONOGRAPHY OF HEART WITH AORTA, TRANSESOPHAGEAL: ICD-10-PCS | Performed by: THORACIC SURGERY (CARDIOTHORACIC VASCULAR SURGERY)

## 2024-02-14 PROCEDURE — 258N000003 HC RX IP 258 OP 636: Performed by: THORACIC SURGERY (CARDIOTHORACIC VASCULAR SURGERY)

## 2024-02-14 PROCEDURE — 272N000202 HC AEROBIKA WITH MANOMETER

## 2024-02-14 PROCEDURE — 272N000001 HC OR GENERAL SUPPLY STERILE: Performed by: THORACIC SURGERY (CARDIOTHORACIC VASCULAR SURGERY)

## 2024-02-14 PROCEDURE — 410N000004: Performed by: THORACIC SURGERY (CARDIOTHORACIC VASCULAR SURGERY)

## 2024-02-14 PROCEDURE — 200N000001 HC R&B ICU

## 2024-02-14 PROCEDURE — 258N000003 HC RX IP 258 OP 636: Performed by: NURSE ANESTHETIST, CERTIFIED REGISTERED

## 2024-02-14 PROCEDURE — 250N000009 HC RX 250: Performed by: NURSE ANESTHETIST, CERTIFIED REGISTERED

## 2024-02-14 PROCEDURE — 250N000009 HC RX 250: Performed by: THORACIC SURGERY (CARDIOTHORACIC VASCULAR SURGERY)

## 2024-02-14 PROCEDURE — P9047 ALBUMIN (HUMAN), 25%, 50ML: HCPCS | Performed by: THORACIC SURGERY (CARDIOTHORACIC VASCULAR SURGERY)

## 2024-02-14 PROCEDURE — 94002 VENT MGMT INPAT INIT DAY: CPT

## 2024-02-14 PROCEDURE — 250N000011 HC RX IP 250 OP 636: Performed by: NURSE ANESTHETIST, CERTIFIED REGISTERED

## 2024-02-14 PROCEDURE — P9016 RBC LEUKOCYTES REDUCED: HCPCS | Performed by: THORACIC SURGERY (CARDIOTHORACIC VASCULAR SURGERY)

## 2024-02-14 PROCEDURE — 999N000065 XR CHEST PORT 1 VIEW

## 2024-02-14 PROCEDURE — 85730 THROMBOPLASTIN TIME PARTIAL: CPT | Performed by: SURGERY

## 2024-02-14 PROCEDURE — 85027 COMPLETE CBC AUTOMATED: CPT | Performed by: NURSE PRACTITIONER

## 2024-02-14 PROCEDURE — 06BQ4ZZ EXCISION OF LEFT SAPHENOUS VEIN, PERCUTANEOUS ENDOSCOPIC APPROACH: ICD-10-PCS | Performed by: THORACIC SURGERY (CARDIOTHORACIC VASCULAR SURGERY)

## 2024-02-14 PROCEDURE — 84100 ASSAY OF PHOSPHORUS: CPT | Performed by: NURSE PRACTITIONER

## 2024-02-14 PROCEDURE — 370N000017 HC ANESTHESIA TECHNICAL FEE, PER MIN: Performed by: THORACIC SURGERY (CARDIOTHORACIC VASCULAR SURGERY)

## 2024-02-14 PROCEDURE — 82330 ASSAY OF CALCIUM: CPT | Performed by: THORACIC SURGERY (CARDIOTHORACIC VASCULAR SURGERY)

## 2024-02-14 PROCEDURE — 250N000013 HC RX MED GY IP 250 OP 250 PS 637: Performed by: THORACIC SURGERY (CARDIOTHORACIC VASCULAR SURGERY)

## 2024-02-14 PROCEDURE — 410N000003 HC PER-PERFUSION 1ST 30 MIN: Performed by: THORACIC SURGERY (CARDIOTHORACIC VASCULAR SURGERY)

## 2024-02-14 PROCEDURE — 250N000013 HC RX MED GY IP 250 OP 250 PS 637: Performed by: NURSE PRACTITIONER

## 2024-02-14 PROCEDURE — 5A1223Z PERFORMANCE OF CARDIAC PACING, CONTINUOUS: ICD-10-PCS | Performed by: THORACIC SURGERY (CARDIOTHORACIC VASCULAR SURGERY)

## 2024-02-14 PROCEDURE — 99291 CRITICAL CARE FIRST HOUR: CPT | Mod: FT | Performed by: NURSE PRACTITIONER

## 2024-02-14 PROCEDURE — C1898 LEAD, PMKR, OTHER THAN TRANS: HCPCS | Performed by: THORACIC SURGERY (CARDIOTHORACIC VASCULAR SURGERY)

## 2024-02-14 PROCEDURE — 82330 ASSAY OF CALCIUM: CPT

## 2024-02-14 PROCEDURE — 999N000141 HC STATISTIC PRE-PROCEDURE NURSING ASSESSMENT: Performed by: THORACIC SURGERY (CARDIOTHORACIC VASCULAR SURGERY)

## 2024-02-14 PROCEDURE — 258N000003 HC RX IP 258 OP 636: Performed by: ANESTHESIOLOGY

## 2024-02-14 PROCEDURE — 84132 ASSAY OF SERUM POTASSIUM: CPT | Performed by: NURSE PRACTITIONER

## 2024-02-14 PROCEDURE — 83605 ASSAY OF LACTIC ACID: CPT | Performed by: SURGERY

## 2024-02-14 PROCEDURE — 83735 ASSAY OF MAGNESIUM: CPT | Performed by: NURSE PRACTITIONER

## 2024-02-14 PROCEDURE — 33512 CABG VEIN THREE: CPT | Performed by: THORACIC SURGERY (CARDIOTHORACIC VASCULAR SURGERY)

## 2024-02-14 PROCEDURE — 86923 COMPATIBILITY TEST ELECTRIC: CPT | Performed by: THORACIC SURGERY (CARDIOTHORACIC VASCULAR SURGERY)

## 2024-02-14 PROCEDURE — 999N000009 HC STATISTIC AIRWAY CARE

## 2024-02-14 PROCEDURE — 250N000025 HC SEVOFLURANE, PER MIN: Performed by: THORACIC SURGERY (CARDIOTHORACIC VASCULAR SURGERY)

## 2024-02-14 PROCEDURE — 250N000009 HC RX 250: Performed by: NURSE PRACTITIONER

## 2024-02-14 PROCEDURE — 82805 BLOOD GASES W/O2 SATURATION: CPT | Performed by: NURSE PRACTITIONER

## 2024-02-14 PROCEDURE — 999N000157 HC STATISTIC RCP TIME EA 10 MIN

## 2024-02-14 PROCEDURE — 85610 PROTHROMBIN TIME: CPT | Performed by: SURGERY

## 2024-02-14 PROCEDURE — C1760 CLOSURE DEV, VASC: HCPCS | Performed by: THORACIC SURGERY (CARDIOTHORACIC VASCULAR SURGERY)

## 2024-02-14 PROCEDURE — 85027 COMPLETE CBC AUTOMATED: CPT | Performed by: NURSE ANESTHETIST, CERTIFIED REGISTERED

## 2024-02-14 PROCEDURE — 250N000012 HC RX MED GY IP 250 OP 636 PS 637: Mod: JZ | Performed by: THORACIC SURGERY (CARDIOTHORACIC VASCULAR SURGERY)

## 2024-02-14 PROCEDURE — 06BP4ZZ EXCISION OF RIGHT SAPHENOUS VEIN, PERCUTANEOUS ENDOSCOPIC APPROACH: ICD-10-PCS | Performed by: THORACIC SURGERY (CARDIOTHORACIC VASCULAR SURGERY)

## 2024-02-14 PROCEDURE — 82330 ASSAY OF CALCIUM: CPT | Performed by: SURGERY

## 2024-02-14 PROCEDURE — 272N000004 HC RX 272: Performed by: THORACIC SURGERY (CARDIOTHORACIC VASCULAR SURGERY)

## 2024-02-14 PROCEDURE — 250N000011 HC RX IP 250 OP 636: Performed by: THORACIC SURGERY (CARDIOTHORACIC VASCULAR SURGERY)

## 2024-02-14 PROCEDURE — 250N000011 HC RX IP 250 OP 636: Performed by: ANESTHESIOLOGY

## 2024-02-14 PROCEDURE — 999N000259 HC STATISTIC EXTUBATION

## 2024-02-14 PROCEDURE — 999N000253 HC STATISTIC WEANING TRIALS

## 2024-02-14 PROCEDURE — 82805 BLOOD GASES W/O2 SATURATION: CPT | Performed by: SURGERY

## 2024-02-14 PROCEDURE — 82803 BLOOD GASES ANY COMBINATION: CPT

## 2024-02-14 PROCEDURE — 999N000055 HC STATISTIC END TITIAL CO2 MONITORING

## 2024-02-14 PROCEDURE — 021209W BYPASS CORONARY ARTERY, THREE ARTERIES FROM AORTA WITH AUTOLOGOUS VENOUS TISSUE, OPEN APPROACH: ICD-10-PCS | Performed by: THORACIC SURGERY (CARDIOTHORACIC VASCULAR SURGERY)

## 2024-02-14 PROCEDURE — 5A1221Z PERFORMANCE OF CARDIAC OUTPUT, CONTINUOUS: ICD-10-PCS | Performed by: THORACIC SURGERY (CARDIOTHORACIC VASCULAR SURGERY)

## 2024-02-14 PROCEDURE — 85384 FIBRINOGEN ACTIVITY: CPT | Performed by: SURGERY

## 2024-02-14 PROCEDURE — 82040 ASSAY OF SERUM ALBUMIN: CPT | Performed by: NURSE PRACTITIONER

## 2024-02-14 PROCEDURE — 360N000079 HC SURGERY LEVEL 6, PER MIN: Performed by: THORACIC SURGERY (CARDIOTHORACIC VASCULAR SURGERY)

## 2024-02-14 PROCEDURE — 250N000011 HC RX IP 250 OP 636: Performed by: NURSE PRACTITIONER

## 2024-02-14 PROCEDURE — 999N000156 HC STATISTIC RCP CONSULT EA 30 MIN

## 2024-02-14 RX ORDER — CEFAZOLIN SODIUM/WATER 2 G/20 ML
2 SYRINGE (ML) INTRAVENOUS
Status: DISCONTINUED | OUTPATIENT
Start: 2024-02-14 | End: 2024-02-14

## 2024-02-14 RX ORDER — METHADONE HYDROCHLORIDE 10 MG/ML
INJECTION, SOLUTION INTRAMUSCULAR; INTRAVENOUS; SUBCUTANEOUS PRN
Status: DISCONTINUED | OUTPATIENT
Start: 2024-02-14 | End: 2024-02-14

## 2024-02-14 RX ORDER — FENTANYL CITRATE 50 UG/ML
INJECTION, SOLUTION INTRAMUSCULAR; INTRAVENOUS PRN
Status: DISCONTINUED | OUTPATIENT
Start: 2024-02-14 | End: 2024-02-14

## 2024-02-14 RX ORDER — NITROGLYCERIN 10 MG/100ML
INJECTION INTRAVENOUS PRN
Status: DISCONTINUED | OUTPATIENT
Start: 2024-02-14 | End: 2024-02-14

## 2024-02-14 RX ORDER — OXYCODONE HYDROCHLORIDE 5 MG/1
10 TABLET ORAL EVERY 4 HOURS PRN
Status: DISCONTINUED | OUTPATIENT
Start: 2024-02-14 | End: 2024-02-21

## 2024-02-14 RX ORDER — ONDANSETRON 4 MG/1
4 TABLET, ORALLY DISINTEGRATING ORAL EVERY 30 MIN PRN
Status: CANCELLED | OUTPATIENT
Start: 2024-02-14

## 2024-02-14 RX ORDER — HEPARIN SODIUM 5000 [USP'U]/.5ML
5000 INJECTION, SOLUTION INTRAVENOUS; SUBCUTANEOUS EVERY 8 HOURS
Status: DISCONTINUED | OUTPATIENT
Start: 2024-02-15 | End: 2024-02-18

## 2024-02-14 RX ORDER — IPRATROPIUM BROMIDE AND ALBUTEROL SULFATE 2.5; .5 MG/3ML; MG/3ML
3 SOLUTION RESPIRATORY (INHALATION) EVERY 4 HOURS PRN
Status: DISCONTINUED | OUTPATIENT
Start: 2024-02-14 | End: 2024-02-22 | Stop reason: HOSPADM

## 2024-02-14 RX ORDER — VANCOMYCIN HYDROCHLORIDE 1 G/20ML
INJECTION, POWDER, LYOPHILIZED, FOR SOLUTION INTRAVENOUS PRN
Status: DISCONTINUED | OUTPATIENT
Start: 2024-02-14 | End: 2024-02-14

## 2024-02-14 RX ORDER — CEFAZOLIN SODIUM 2 G/100ML
2 INJECTION, SOLUTION INTRAVENOUS EVERY 8 HOURS
Status: COMPLETED | OUTPATIENT
Start: 2024-02-14 | End: 2024-02-15

## 2024-02-14 RX ORDER — LIDOCAINE HYDROCHLORIDE 10 MG/ML
INJECTION, SOLUTION INFILTRATION; PERINEURAL PRN
Status: DISCONTINUED | OUTPATIENT
Start: 2024-02-14 | End: 2024-02-14

## 2024-02-14 RX ORDER — METHADONE HYDROCHLORIDE 10 MG/ML
INJECTION, SOLUTION INTRAMUSCULAR; INTRAVENOUS; SUBCUTANEOUS
Status: DISCONTINUED
Start: 2024-02-14 | End: 2024-02-14 | Stop reason: HOSPADM

## 2024-02-14 RX ORDER — PHENYLEPHRINE HCL IN 0.9% NACL 50MG/250ML
.1-4 PLASTIC BAG, INJECTION (ML) INTRAVENOUS CONTINUOUS PRN
Status: DISCONTINUED | OUTPATIENT
Start: 2024-02-14 | End: 2024-02-16

## 2024-02-14 RX ORDER — CALCIUM GLUCONATE 20 MG/ML
2 INJECTION, SOLUTION INTRAVENOUS
Status: ACTIVE | OUTPATIENT
Start: 2024-02-14 | End: 2024-02-22

## 2024-02-14 RX ORDER — FENTANYL CITRATE-0.9 % NACL/PF 10 MCG/ML
PLASTIC BAG, INJECTION (ML) INTRAVENOUS
Status: DISCONTINUED
Start: 2024-02-14 | End: 2024-02-14 | Stop reason: HOSPADM

## 2024-02-14 RX ORDER — SODIUM CHLORIDE, SODIUM LACTATE, POTASSIUM CHLORIDE, CALCIUM CHLORIDE 600; 310; 30; 20 MG/100ML; MG/100ML; MG/100ML; MG/100ML
INJECTION, SOLUTION INTRAVENOUS CONTINUOUS
Status: CANCELLED | OUTPATIENT
Start: 2024-02-14

## 2024-02-14 RX ORDER — LIDOCAINE 4 G/G
1-2 PATCH TOPICAL EVERY 24 HOURS
Status: DISCONTINUED | OUTPATIENT
Start: 2024-02-14 | End: 2024-02-22 | Stop reason: HOSPADM

## 2024-02-14 RX ORDER — ONDANSETRON 2 MG/ML
4 INJECTION INTRAMUSCULAR; INTRAVENOUS EVERY 30 MIN PRN
Status: CANCELLED | OUTPATIENT
Start: 2024-02-14

## 2024-02-14 RX ORDER — HEPARIN SODIUM 1000 [USP'U]/ML
INJECTION, SOLUTION INTRAVENOUS; SUBCUTANEOUS PRN
Status: DISCONTINUED | OUTPATIENT
Start: 2024-02-14 | End: 2024-02-14

## 2024-02-14 RX ORDER — KETAMINE HYDROCHLORIDE 10 MG/ML
INJECTION INTRAMUSCULAR; INTRAVENOUS PRN
Status: DISCONTINUED | OUTPATIENT
Start: 2024-02-14 | End: 2024-02-14

## 2024-02-14 RX ORDER — HYDROMORPHONE HYDROCHLORIDE 1 MG/ML
0.2 INJECTION, SOLUTION INTRAMUSCULAR; INTRAVENOUS; SUBCUTANEOUS EVERY 5 MIN PRN
Status: CANCELLED | OUTPATIENT
Start: 2024-02-14

## 2024-02-14 RX ORDER — BISACODYL 10 MG
10 SUPPOSITORY, RECTAL RECTAL DAILY PRN
Status: DISCONTINUED | OUTPATIENT
Start: 2024-02-14 | End: 2024-02-22 | Stop reason: HOSPADM

## 2024-02-14 RX ORDER — OXYCODONE HYDROCHLORIDE 5 MG/1
5 TABLET ORAL EVERY 4 HOURS PRN
Status: DISCONTINUED | OUTPATIENT
Start: 2024-02-14 | End: 2024-02-21

## 2024-02-14 RX ORDER — FENTANYL CITRATE 50 UG/ML
25 INJECTION, SOLUTION INTRAMUSCULAR; INTRAVENOUS EVERY 5 MIN PRN
Status: CANCELLED | OUTPATIENT
Start: 2024-02-14

## 2024-02-14 RX ORDER — ONDANSETRON 2 MG/ML
4 INJECTION INTRAMUSCULAR; INTRAVENOUS EVERY 6 HOURS PRN
Status: DISCONTINUED | OUTPATIENT
Start: 2024-02-14 | End: 2024-02-22 | Stop reason: HOSPADM

## 2024-02-14 RX ORDER — POLYETHYLENE GLYCOL 3350 17 G/17G
17 POWDER, FOR SOLUTION ORAL DAILY
Status: DISCONTINUED | OUTPATIENT
Start: 2024-02-15 | End: 2024-02-22 | Stop reason: HOSPADM

## 2024-02-14 RX ORDER — LIDOCAINE 40 MG/G
CREAM TOPICAL
Status: DISCONTINUED | OUTPATIENT
Start: 2024-02-14 | End: 2024-02-14

## 2024-02-14 RX ORDER — PROTAMINE SULFATE 10 MG/ML
INJECTION, SOLUTION INTRAVENOUS PRN
Status: DISCONTINUED | OUTPATIENT
Start: 2024-02-14 | End: 2024-02-14

## 2024-02-14 RX ORDER — CLOTRIMAZOLE 1 %
CREAM (GRAM) TOPICAL DAILY
COMMUNITY
End: 2024-08-07

## 2024-02-14 RX ORDER — HYDROMORPHONE HYDROCHLORIDE 1 MG/ML
0.4 INJECTION, SOLUTION INTRAMUSCULAR; INTRAVENOUS; SUBCUTANEOUS EVERY 5 MIN PRN
Status: CANCELLED | OUTPATIENT
Start: 2024-02-14

## 2024-02-14 RX ORDER — NYSTATIN 100000 U/G
CREAM TOPICAL 2 TIMES DAILY
Status: DISCONTINUED | OUTPATIENT
Start: 2024-02-14 | End: 2024-02-22 | Stop reason: HOSPADM

## 2024-02-14 RX ORDER — ASPIRIN 300 MG/1
300 SUPPOSITORY RECTAL DAILY
Status: DISCONTINUED | OUTPATIENT
Start: 2024-02-14 | End: 2024-02-16 | Stop reason: ALTCHOICE

## 2024-02-14 RX ORDER — PHENYLEPHRINE HCL IN 0.9% NACL 50MG/250ML
.1-6 PLASTIC BAG, INJECTION (ML) INTRAVENOUS CONTINUOUS
Status: DISCONTINUED | OUTPATIENT
Start: 2024-02-14 | End: 2024-02-14

## 2024-02-14 RX ORDER — DEXTROSE MONOHYDRATE 25 G/50ML
25-50 INJECTION, SOLUTION INTRAVENOUS
Status: DISCONTINUED | OUTPATIENT
Start: 2024-02-14 | End: 2024-02-22 | Stop reason: HOSPADM

## 2024-02-14 RX ORDER — SODIUM CHLORIDE, SODIUM LACTATE, POTASSIUM CHLORIDE, CALCIUM CHLORIDE 600; 310; 30; 20 MG/100ML; MG/100ML; MG/100ML; MG/100ML
INJECTION, SOLUTION INTRAVENOUS CONTINUOUS
Status: DISCONTINUED | OUTPATIENT
Start: 2024-02-14 | End: 2024-02-14

## 2024-02-14 RX ORDER — ASPIRIN 81 MG/1
81 TABLET, CHEWABLE ORAL
Status: COMPLETED | OUTPATIENT
Start: 2024-02-14 | End: 2024-02-14

## 2024-02-14 RX ORDER — CEFAZOLIN SODIUM/WATER 2 G/20 ML
2 SYRINGE (ML) INTRAVENOUS SEE ADMIN INSTRUCTIONS
Status: DISCONTINUED | OUTPATIENT
Start: 2024-02-14 | End: 2024-02-14

## 2024-02-14 RX ORDER — PROPOFOL 10 MG/ML
INJECTION, EMULSION INTRAVENOUS PRN
Status: DISCONTINUED | OUTPATIENT
Start: 2024-02-14 | End: 2024-02-14

## 2024-02-14 RX ORDER — ACETAMINOPHEN 325 MG/1
650 TABLET ORAL EVERY 4 HOURS PRN
Status: DISCONTINUED | OUTPATIENT
Start: 2024-02-17 | End: 2024-02-22 | Stop reason: HOSPADM

## 2024-02-14 RX ORDER — FENTANYL CITRATE 50 UG/ML
25-100 INJECTION, SOLUTION INTRAMUSCULAR; INTRAVENOUS
Status: DISCONTINUED | OUTPATIENT
Start: 2024-02-14 | End: 2024-02-14

## 2024-02-14 RX ORDER — SODIUM CHLORIDE, SODIUM LACTATE, POTASSIUM CHLORIDE, CALCIUM CHLORIDE 600; 310; 30; 20 MG/100ML; MG/100ML; MG/100ML; MG/100ML
INJECTION, SOLUTION INTRAVENOUS CONTINUOUS PRN
Status: DISCONTINUED | OUTPATIENT
Start: 2024-02-14 | End: 2024-02-14

## 2024-02-14 RX ORDER — MAGNESIUM SULFATE 4 G/50ML
4 INJECTION INTRAVENOUS ONCE
Status: COMPLETED | OUTPATIENT
Start: 2024-02-14 | End: 2024-02-14

## 2024-02-14 RX ORDER — SODIUM CHLORIDE 9 MG/ML
INJECTION, SOLUTION INTRAVENOUS CONTINUOUS PRN
Status: DISCONTINUED | OUTPATIENT
Start: 2024-02-14 | End: 2024-02-14

## 2024-02-14 RX ORDER — CHLORHEXIDINE GLUCONATE ORAL RINSE 1.2 MG/ML
10 SOLUTION DENTAL ONCE
Status: COMPLETED | OUTPATIENT
Start: 2024-02-14 | End: 2024-02-14

## 2024-02-14 RX ORDER — DEXMEDETOMIDINE HYDROCHLORIDE 4 UG/ML
.2-.7 INJECTION, SOLUTION INTRAVENOUS CONTINUOUS
Status: DISCONTINUED | OUTPATIENT
Start: 2024-02-14 | End: 2024-02-15

## 2024-02-14 RX ORDER — FENTANYL CITRATE 50 UG/ML
50 INJECTION, SOLUTION INTRAMUSCULAR; INTRAVENOUS EVERY 5 MIN PRN
Status: CANCELLED | OUTPATIENT
Start: 2024-02-14

## 2024-02-14 RX ORDER — ROSUVASTATIN CALCIUM 40 MG/1
40 TABLET, COATED ORAL EVERY EVENING
Status: DISCONTINUED | OUTPATIENT
Start: 2024-02-14 | End: 2024-02-22 | Stop reason: HOSPADM

## 2024-02-14 RX ORDER — ASPIRIN 81 MG/1
162 TABLET, CHEWABLE ORAL DAILY
Status: DISCONTINUED | OUTPATIENT
Start: 2024-02-14 | End: 2024-02-18

## 2024-02-14 RX ORDER — ASPIRIN 81 MG/1
162 TABLET, CHEWABLE ORAL
Status: COMPLETED | OUTPATIENT
Start: 2024-02-14 | End: 2024-02-14

## 2024-02-14 RX ORDER — CHLORHEXIDINE GLUCONATE ORAL RINSE 1.2 MG/ML
15 SOLUTION DENTAL EVERY 12 HOURS
Status: DISCONTINUED | OUTPATIENT
Start: 2024-02-14 | End: 2024-02-15

## 2024-02-14 RX ORDER — AMOXICILLIN 250 MG
1 CAPSULE ORAL 2 TIMES DAILY
Status: DISCONTINUED | OUTPATIENT
Start: 2024-02-14 | End: 2024-02-22 | Stop reason: HOSPADM

## 2024-02-14 RX ORDER — ACETAMINOPHEN 325 MG/1
975 TABLET ORAL EVERY 8 HOURS
Status: COMPLETED | OUTPATIENT
Start: 2024-02-14 | End: 2024-02-17

## 2024-02-14 RX ORDER — NICOTINE POLACRILEX 4 MG
15-30 LOZENGE BUCCAL
Status: DISCONTINUED | OUTPATIENT
Start: 2024-02-14 | End: 2024-02-22 | Stop reason: HOSPADM

## 2024-02-14 RX ORDER — PANTOPRAZOLE SODIUM 40 MG/1
40 TABLET, DELAYED RELEASE ORAL DAILY
Status: DISCONTINUED | OUTPATIENT
Start: 2024-02-14 | End: 2024-02-22 | Stop reason: HOSPADM

## 2024-02-14 RX ORDER — ALBUMIN (HUMAN) 12.5 G/50ML
12.5 SOLUTION INTRAVENOUS
Status: DISCONTINUED | OUTPATIENT
Start: 2024-02-14 | End: 2024-02-15

## 2024-02-14 RX ORDER — CEFAZOLIN SODIUM/WATER 2 G/20 ML
2 SYRINGE (ML) INTRAVENOUS EVERY 8 HOURS
Qty: 60 ML | Refills: 0 | Status: DISCONTINUED | OUTPATIENT
Start: 2024-02-14 | End: 2024-02-14

## 2024-02-14 RX ORDER — CALCIUM GLUCONATE 20 MG/ML
1 INJECTION, SOLUTION INTRAVENOUS
Status: DISPENSED | OUTPATIENT
Start: 2024-02-14 | End: 2024-02-22

## 2024-02-14 RX ORDER — SODIUM CHLORIDE, SODIUM GLUCONATE, SODIUM ACETATE, POTASSIUM CHLORIDE AND MAGNESIUM CHLORIDE 526; 502; 368; 37; 30 MG/100ML; MG/100ML; MG/100ML; MG/100ML; MG/100ML
1000 INJECTION, SOLUTION INTRAVENOUS
Status: DISCONTINUED | OUTPATIENT
Start: 2024-02-14 | End: 2024-02-14

## 2024-02-14 RX ORDER — GLYCOPYRROLATE 0.2 MG/ML
INJECTION, SOLUTION INTRAMUSCULAR; INTRAVENOUS PRN
Status: DISCONTINUED | OUTPATIENT
Start: 2024-02-14 | End: 2024-02-14

## 2024-02-14 RX ORDER — DEXMEDETOMIDINE HYDROCHLORIDE 4 UG/ML
.2-1.2 INJECTION, SOLUTION INTRAVENOUS CONTINUOUS
Status: DISCONTINUED | OUTPATIENT
Start: 2024-02-14 | End: 2024-02-14

## 2024-02-14 RX ORDER — ONDANSETRON 4 MG/1
4 TABLET, ORALLY DISINTEGRATING ORAL EVERY 6 HOURS PRN
Status: DISCONTINUED | OUTPATIENT
Start: 2024-02-14 | End: 2024-02-22 | Stop reason: HOSPADM

## 2024-02-14 RX ORDER — PROCHLORPERAZINE MALEATE 5 MG
5 TABLET ORAL EVERY 6 HOURS PRN
Status: DISCONTINUED | OUTPATIENT
Start: 2024-02-14 | End: 2024-02-22 | Stop reason: HOSPADM

## 2024-02-14 RX ORDER — HYDRALAZINE HYDROCHLORIDE 20 MG/ML
10 INJECTION INTRAMUSCULAR; INTRAVENOUS EVERY 30 MIN PRN
Status: DISCONTINUED | OUTPATIENT
Start: 2024-02-14 | End: 2024-02-21

## 2024-02-14 RX ADMIN — DEXMEDETOMIDINE HYDROCHLORIDE 0.5 MCG/KG/HR: 400 INJECTION INTRAVENOUS at 07:50

## 2024-02-14 RX ADMIN — EPINEPHRINE 0.03 MCG/KG/MIN: 1 INJECTION INTRAMUSCULAR; INTRAVENOUS; SUBCUTANEOUS at 11:15

## 2024-02-14 RX ADMIN — SODIUM CHLORIDE: 9 INJECTION, SOLUTION INTRAVENOUS at 07:43

## 2024-02-14 RX ADMIN — ALBUMIN HUMAN 12.5 G: 0.25 SOLUTION INTRAVENOUS at 16:52

## 2024-02-14 RX ADMIN — Medication 2 G: at 11:39

## 2024-02-14 RX ADMIN — DESMOPRESSIN ACETATE 31.44 MCG: 4 INJECTION, SOLUTION INTRAVENOUS; SUBCUTANEOUS at 12:30

## 2024-02-14 RX ADMIN — FENTANYL CITRATE 100 MCG: 50 INJECTION INTRAMUSCULAR; INTRAVENOUS at 07:20

## 2024-02-14 RX ADMIN — MAGNESIUM SULFATE HEPTAHYDRATE 4 G: 80 INJECTION, SOLUTION INTRAVENOUS at 06:22

## 2024-02-14 RX ADMIN — SODIUM CHLORIDE 7.5 G: 900 INJECTION, SOLUTION INTRAVENOUS at 07:49

## 2024-02-14 RX ADMIN — PROPOFOL 150 MG: 10 INJECTION, EMULSION INTRAVENOUS at 07:29

## 2024-02-14 RX ADMIN — SODIUM CHLORIDE: 9 INJECTION, SOLUTION INTRAVENOUS at 12:23

## 2024-02-14 RX ADMIN — SODIUM BICARBONATE 50 MEQ: 84 INJECTION, SOLUTION INTRAVENOUS at 14:48

## 2024-02-14 RX ADMIN — ROCURONIUM BROMIDE 100 MG: 50 INJECTION, SOLUTION INTRAVENOUS at 07:29

## 2024-02-14 RX ADMIN — ALBUMIN HUMAN 12.5 G: 0.25 SOLUTION INTRAVENOUS at 14:43

## 2024-02-14 RX ADMIN — SODIUM CHLORIDE, POTASSIUM CHLORIDE, SODIUM LACTATE AND CALCIUM CHLORIDE: 600; 310; 30; 20 INJECTION, SOLUTION INTRAVENOUS at 07:05

## 2024-02-14 RX ADMIN — NYSTATIN: 100000 CREAM TOPICAL at 21:17

## 2024-02-14 RX ADMIN — PROTAMINE SULFATE 200 MG: 10 INJECTION, SOLUTION INTRAVENOUS at 11:40

## 2024-02-14 RX ADMIN — SENNOSIDES AND DOCUSATE SODIUM 1 TABLET: 8.6; 5 TABLET ORAL at 21:13

## 2024-02-14 RX ADMIN — INSULIN HUMAN 2 UNITS/HR: 1 INJECTION, SOLUTION INTRAVENOUS at 11:42

## 2024-02-14 RX ADMIN — ROCURONIUM BROMIDE 50 MG: 50 INJECTION, SOLUTION INTRAVENOUS at 11:01

## 2024-02-14 RX ADMIN — INSULIN HUMAN 0.5 UNITS/HR: 1 INJECTION, SOLUTION INTRAVENOUS at 13:07

## 2024-02-14 RX ADMIN — ROSUVASTATIN CALCIUM 40 MG: 40 TABLET, COATED ORAL at 21:13

## 2024-02-14 RX ADMIN — Medication 2 G: at 07:39

## 2024-02-14 RX ADMIN — SODIUM CHLORIDE, POTASSIUM CHLORIDE, SODIUM LACTATE AND CALCIUM CHLORIDE 250 ML: 600; 310; 30; 20 INJECTION, SOLUTION INTRAVENOUS at 06:15

## 2024-02-14 RX ADMIN — Medication 20 MG: at 07:28

## 2024-02-14 RX ADMIN — Medication 0.3 MCG/KG/MIN: at 12:04

## 2024-02-14 RX ADMIN — PANTOPRAZOLE SODIUM 40 MG: 40 TABLET, DELAYED RELEASE ORAL at 21:13

## 2024-02-14 RX ADMIN — ASPIRIN 300 MG: 300 SUPPOSITORY RECTAL at 15:35

## 2024-02-14 RX ADMIN — GLYCOPYRROLATE 0.2 MG: 0.2 INJECTION INTRAMUSCULAR; INTRAVENOUS at 08:24

## 2024-02-14 RX ADMIN — LIDOCAINE HYDROCHLORIDE 50 MG: 10 INJECTION, SOLUTION INFILTRATION; PERINEURAL at 07:28

## 2024-02-14 RX ADMIN — KETAMINE HYDROCHLORIDE 50 MG: 10 INJECTION INTRAMUSCULAR; INTRAVENOUS at 07:29

## 2024-02-14 RX ADMIN — HEPARIN SODIUM 40000 UNITS: 1000 INJECTION INTRAVENOUS; SUBCUTANEOUS at 09:11

## 2024-02-14 RX ADMIN — HYDROMORPHONE HYDROCHLORIDE 0.5 MG: 1 INJECTION, SOLUTION INTRAMUSCULAR; INTRAVENOUS; SUBCUTANEOUS at 16:13

## 2024-02-14 RX ADMIN — PHENYLEPHRINE HYDROCHLORIDE 100 MCG: 10 INJECTION INTRAVENOUS at 07:43

## 2024-02-14 RX ADMIN — CHLORHEXIDINE GLUCONATE 0.12% ORAL RINSE 10 ML: 1.2 LIQUID ORAL at 06:29

## 2024-02-14 RX ADMIN — DEXMEDETOMIDINE HYDROCHLORIDE 0.4 MCG/KG/HR: 400 INJECTION INTRAVENOUS at 13:50

## 2024-02-14 RX ADMIN — ALBUMIN HUMAN 12.5 G: 0.25 SOLUTION INTRAVENOUS at 22:56

## 2024-02-14 RX ADMIN — PHENYLEPHRINE HYDROCHLORIDE 50 MCG: 10 INJECTION INTRAVENOUS at 08:02

## 2024-02-14 RX ADMIN — ACETAMINOPHEN 975 MG: 325 TABLET ORAL at 21:13

## 2024-02-14 RX ADMIN — PHENYLEPHRINE HYDROCHLORIDE 50 MCG: 10 INJECTION INTRAVENOUS at 08:17

## 2024-02-14 RX ADMIN — LIDOCAINE 1 PATCH: 4 PATCH TOPICAL at 13:44

## 2024-02-14 RX ADMIN — Medication 0.3 MCG/KG/MIN: at 13:28

## 2024-02-14 RX ADMIN — SODIUM CHLORIDE 5 G: 900 INJECTION, SOLUTION INTRAVENOUS at 08:49

## 2024-02-14 RX ADMIN — PHENYLEPHRINE HYDROCHLORIDE 100 MCG: 10 INJECTION INTRAVENOUS at 07:30

## 2024-02-14 RX ADMIN — CALCIUM GLUCONATE 1 G: 20 INJECTION, SOLUTION INTRAVENOUS at 13:40

## 2024-02-14 RX ADMIN — SODIUM CHLORIDE: 9 INJECTION, SOLUTION INTRAVENOUS at 11:21

## 2024-02-14 RX ADMIN — NITROGLYCERIN 20 MCG: 10 INJECTION INTRAVENOUS at 07:47

## 2024-02-14 RX ADMIN — CEFAZOLIN SODIUM 2 G: 2 INJECTION, SOLUTION INTRAVENOUS at 20:02

## 2024-02-14 RX ADMIN — HYDROMORPHONE HYDROCHLORIDE 0.5 MG: 1 INJECTION, SOLUTION INTRAMUSCULAR; INTRAVENOUS; SUBCUTANEOUS at 13:34

## 2024-02-14 RX ADMIN — NITROGLYCERIN 20 MCG: 10 INJECTION INTRAVENOUS at 08:22

## 2024-02-14 RX ADMIN — DEXMEDETOMIDINE HYDROCHLORIDE 0.5 MCG/KG/HR: 400 INJECTION INTRAVENOUS at 13:27

## 2024-02-14 RX ADMIN — NITROGLYCERIN 20 MCG: 10 INJECTION INTRAVENOUS at 07:55

## 2024-02-14 RX ADMIN — SUGAMMADEX 200 MG: 100 INJECTION, SOLUTION INTRAVENOUS at 12:51

## 2024-02-14 RX ADMIN — VANCOMYCIN HYDROCHLORIDE 1500 MG: 5 INJECTION, POWDER, LYOPHILIZED, FOR SOLUTION INTRAVENOUS at 06:22

## 2024-02-14 RX ADMIN — CHLORHEXIDINE GLUCONATE 15 ML: 1.2 SOLUTION ORAL at 19:42

## 2024-02-14 ASSESSMENT — ACTIVITIES OF DAILY LIVING (ADL)
ADLS_ACUITY_SCORE: 26
ADLS_ACUITY_SCORE: 20
ADLS_ACUITY_SCORE: 28
ADLS_ACUITY_SCORE: 20
ADLS_ACUITY_SCORE: 26

## 2024-02-14 NOTE — CONSULTS
PULMONARY / CRITICAL CARE CONSULTATION NOTE      Consultation - Pulmonary/Critical Care Medicine  Nick Zaldivar,  1945, MRN [id number]  Date / Time of Admission:  2024  5:07 AM    Admitting Dx: Coronary artery disease due to lipid rich plaque [I25.10, I25.83]  S/P AVR (aortic valve replacement) [Z95.2]    PCP: [facility], [phone]  Consulting physician:  Carmelina Flanagan MD   Code status:  Full Code       Extended Emergency Contact Information  Primary Emergency Contact: Larissa Zaldivar  Home Phone: [phone]  Relation: Other  Secondary Emergency Contact: Rodrigo Zaldivar  Home Phone: [phone]  Relation: Other       ID:  Nick Zaldivar is a 78 year old male with CAD and moderate Aortic stenosis. Underwent CABG x 3 with Dr. Flanagan today.         ASSESSMENT & PLAN BY SYSTEM   Systems to Assess:   Pulmonary: Post op vent management; no underlying lung disease documented. Remote hx of smoking back in the 1970s  Wean supplemental O2 as tolerated; goal O2 sat > 92%.  HOB > 30 degrees to limit aspiration risk.    Check ABG and adjust support as indicated; avoid acidotic state.   Check CXR  Once hemodynamically stable, plan to proceed to wake, wean, and extubate with goal < 6-hours.  Goal extubation by 0  Cardiovascular: CAD s/p CABG x 3 with Dr. Flanagan. Moderate Ao Stenosis - unable to complete AVR today due to calcifications and clamp proximity.   Cardiac monitoring.   SBP goal > 90 mmHg, MAP goal > 65 mmHg.    Goal CI 2-3   Goal CVP 12-14  Vasoactive gtts per CV-surgery.   Temporary pacing wires present; will use in setting of symptomatic arrhythmia.   Currently paced @ 90  Underlying rhythm: asystole  Neurological: sedation for vent synchrony and comfort.    Neuro checks per ICU protocol.   Sedate with precedex until ready to wean and extubate.   Pain control: PRN  Goal RASS 0 to -1   GI/: no acute issues  NPO until extubated and fully awake.   Salazar catheter in  place for accurate measurement of I/O.   GI prophylaxis:  Omeprazole  Renal: no acute issues   Monitor I/O's.  Electrolyte repletion PRN.  Avoid/limit nephrotoxic agents.   IVFs: per CV-surgery  Heme/Coag: Acute blood loss anemia; coagulopathy secondary to pump run.   Monitor H/H.   Transfuse per CV-surgery.   Monitor chest tube output hourly; notify CV-surgery for excessive output or abrupt stop in output.   DVT prophylaxis:  SubQ heparin  Infectious disease:   General precautions.   Remove lines and drains once no longer required.   Endocrine: hyperglycemia - stress induced   RHI gtt per ICU protocol.   Musculoskeletal:   Bedrest; initiate mobility protocol.     Lines: A-line, Day# 1, Central line, Day# 1 or Empire Silvino, Day# 1      Code Status:  FULL CODE    Thank you for this consultation.  Please call if any questions or concerns.        This patient is considered critically ill and requires ICU level of care due to immediate post CV-surgical procedure. High risk for acute hemodynamic collapse and death.     Total Critical Care time, not including separate billable procedure time:  35 minutes.    Odessa Morales, CNP  [facility] Pulmonary/Critical Care      Chief Complaint chief complaint       HPI    Nick Zaldivar is a 78 year old male with CAD, Aortic stenosis, and hypertension. Underwent CABG x 3 with Dr. Flanagan today. Procedure reported as straight forward. No difficulty with line placement or intubation. Preprocedure echo showed normal EF and moderate aortic stenosis. No difficulty going on nor coming off pump.  EBL 500mL; received 600mL Cell Saver and a dose of DDAVP. No bleeding concerns at end of case. Post procedure echo showed preserved function and no change in valve findings. Required pacing due to no underlying heart rhythm. Came to ICU on epi and phenylephrine; on mech ventilation and sedated on precedex.   Direct sign out received at the bedside from Dr. Ardon.        Review of Systems    Review of systems not obtained due to patient factors.     Active Problem List   Patient Active Problem List    Diagnosis Date Noted    S/P AVR (aortic valve replacement) 02/14/2024     Priority: Medium    Angina pectoris (H24) 01/22/2024     Priority: Medium    CARRILLO (dyspnea on exertion) 01/22/2024     Priority: Medium    Status post coronary angiogram 01/22/2024     Priority: Medium    Other ill-defined heart diseases 01/22/2024     Priority: Medium    Benign paroxysmal positional vertigo due to bilateral vestibular disorder 02/10/2022     Priority: Medium    Hyperkalemia 02/10/2022     Priority: Medium    Coronary artery disease due to lipid rich plaque 12/08/2021     Priority: Medium    Primary hypertension 12/08/2021     Priority: Medium    Pure hypercholesterolemia 12/08/2021     Priority: Medium    Rheumatic aortic stenosis 12/08/2021     Priority: Medium    Obesity 12/08/2021     Priority: Medium         Medical/Surgical History   Past Medical History:   Diagnosis Date    Aortic stenosis     Coronary artery disease     Hypertension      Past Surgical History:   Procedure Laterality Date    CV CORONARY ANGIOGRAM N/A 1/22/2024    Procedure: Coronary Angiogram;  Surgeon: Harman Ring MD;  Location: [facility] CATH LAB CV    CV RIGHT HEART CATH MEASUREMENTS RECORDED N/A 1/22/2024    Procedure: Right Heart Catheterization;  Surgeon: Harman Ring MD;  Location: [facility] CATH LAB CV         Allergies   Allergies   Allergen Reactions    Bee Venom      Other Reaction(s): Edema         Medications:  OUTpatient medications   Prior to Admission medications    Medication Sig Start Date End Date Taking? Authorizing Provider   aspirin 81 MG EC tablet [ASPIRIN 81 MG EC TABLET] Take 81 mg by mouth daily. 1/15/20  Yes Provider, Historical   clotrimazole (LOTRIMIN) 1 % external cream Apply topically 2 times daily   Yes Unknown, Entered By History   furosemide (LASIX) 20 MG tablet Take 2 tablets (40 mg) by mouth 2  times daily 24  Yes Harman Ring MD   ibuprofen (ADVIL/MOTRIN) 400 MG tablet Take 400 mg by mouth once as needed for moderate pain   Yes Unknown, Entered By History   isosorbide mononitrate (IMDUR) 30 MG 24 hr tablet Take 1 tablet (30 mg) by mouth daily 24  Yes Chelsie Rodriguez CNP   lisinopril (ZESTRIL) 40 MG tablet Take 40 mg by mouth daily 21  Yes Reported, Patient   metoprolol tartrate (LOPRESSOR) 25 MG tablet Take 0.5 tablets (12.5 mg) by mouth 2 times daily 22  Yes Jas Hope MD   multivitamin w/minerals (THERA-VIT-M) tablet Take 1 tablet by mouth daily   Yes Unknown, Entered By History   nitroGLYcerin (NITROSTAT) 0.4 MG sublingual tablet See Admin Instructions 21  Yes Reported, Patient   rosuvastatin (CRESTOR) 40 MG tablet TAKE 1 TABLET BY MOUTH DAILY 23  Yes Jas Hope MD           Medications:  INpatient medications    acetaminophen  975 mg Oral Q8H    aspirin  162 mg Oral or NG Tube Daily    Or    aspirin  300 mg Rectal Daily    ceFAZolin  2 g Intravenous Q8H    chlorhexidine  15 mL Mouth/Throat Q12H    [START ON 2/15/2024] heparin ANTICOAGULANT  5,000 Units Subcutaneous Q8H    lidocaine  1-2 patch Transdermal Q24H    pantoprazole  40 mg Oral or NG Tube Daily    Or    pantoprazole  40 mg Oral Daily    [START ON 2/15/2024] polyethylene glycol  17 g Oral Daily    rosuvastatin  40 mg Oral QPM    senna-docusate  1 tablet Oral BID           Family History  Social History   Reviewed  History reviewed. No pertinent family history.  Reviewed  Social History     Socioeconomic History    Marital status:      Spouse name: Not on file    Number of children: Not on file    Years of education: Not on file    Highest education level: Not on file   Occupational History    Not on file   Tobacco Use    Smoking status: Former     Types: Cigarettes     Quit date: 1978     Years since quittin.1    Smokeless tobacco: Never   Vaping Use    Vaping Use: Never  "used   Substance and Sexual Activity    Alcohol use: Not Currently    Drug use: Never    Sexual activity: Not on file   Other Topics Concern    Not on file   Social History Narrative    Not on file     Social Determinants of Health     Financial Resource Strain: Not on file   Food Insecurity: Not on file   Transportation Needs: Not on file   Physical Activity: Not on file   Stress: Not on file   Social Connections: Not on file   Interpersonal Safety: Not on file   Housing Stability: Not on file      Psychosocial Needs  Social History     Social History Narrative    Not on file     Additional psychosocial needs reviewed per nursing assessment.      Physical Exam   VITALS:  BP (!) 158/79   Pulse 89   Temp 97.1  F (36.2  C) (Axillary)   Resp 14   Ht 1.778 m (5' 10\")   Wt 104.8 kg (231 lb)   SpO2 100%   BMI 33.15 kg/m    [unfilled]    PHYSICAL EXAM:   GEN: intubated and sedated  HEENT:  OETT in place; AT/NC  NECK: Supple.  RIJ introducer with double lumen slick catheter  PULM:  unlabored on full vent; lungs are coarse throughout, no wheeze  CVS:   100% Paced; S1S2 no murmur  ABDOMEN:   soft ntnd  EXTREMITIES/SKIN:    visible skin intact  NEURO:  sedated    I&O:    Intake/Output Summary (Last 24 hours) at 2/14/2024 1416  Last data filed at 2/14/2024 1252  Gross per 24 hour   Intake 2500 ml   Output 920 ml   Net 1580 ml        Pertinent Labs   Lab Results: personally reviewed.      CBC:  Recent Labs   Lab 02/14/24  1309 02/14/24  1305 02/14/24  1156   WBC  --  19.9* 15.8*   HGB 10.5* 11.7* 8.7*   HCT 31* 35.8* 27.0*   PLT  --  174 152     Chemistry:  Recent Labs   Lab 02/14/24  1309 02/14/24  1301 02/14/24  1154    144 141   CO2  --  21*  --    BUN  --  17.6  --    ALBUMIN  --  3.0*  --    ALT  --  30  --    AST  --  46*  --    ALKPHOS  --  43  --      Coags:  Lab Results   Component Value Date    INR 1.43 (H) 02/14/2024    INR 1.03 02/06/2024        Microbiology:  None this admission   "     Radiology:  Chest X-Ray: post op film not yet done

## 2024-02-14 NOTE — PROGRESS NOTES
PROVIDER RESTRAINT FOR NON-VIOLENT BEHAVIOR FACE TO FACE EVALUATION    Patient's Immediate Situation:  Patient demonstrated the following behaviors:  recovering from anesthesia; impulsive and pulling at lines and tubes. Does not respond to redirection.     Patient's Reaction to the intervention:  Does patient understand the reason for restraint/seclusion? Unable to express     Medical Condition:  Is there any evidence of compromise of Skin integrity, Respiratory, Cardiovascular, Musculoskeletal, Hydration?   No    Behavioral Condition:  In consultation with the RN, is there a need to continue this restraint or seclusion? Yes    See Restraint Flowsheet for complete restraint documentation and assessment.    Odessa Morales, CNP  [facility] Pulmonary/Critical Care

## 2024-02-14 NOTE — PROGRESS NOTES
Received PT from OR to ICU and placed on mechanical ventilation at approx 12:45 this afternoon with the initial settings of CMV 18 600 +5 100%. ETT 7.5 approx 23T.  Continuous end-tidal CO2 monitoring started.  ETCO2 31. SpO2 100%.  BS coarse.  No secretions obtained by suction.   RT will coordinate with RN and NP to wean to extubate.    Renny Finley, RT  2/14/2024

## 2024-02-14 NOTE — ANESTHESIA PROCEDURE NOTES
Arterial Line Procedure Note    Pre-Procedure   Staff -        Anesthesiologist:  Elfego Ardon MD       Performed By: anesthesiologist       Location: OR       Pre-Anesthestic Checklist: patient identified, IV checked, risks and benefits discussed, informed consent, monitors and equipment checked, pre-op evaluation and at physician/surgeon's request  Timeout:       Correct Patient: Yes        Correct Procedure: Yes        Correct Site: Yes        Correct Position: Yes   Line Placement:   This line was placed Pre Induction starting at 2/14/2024 7:14 AM and ending at 2/14/2024 7:26 AM  Procedure   Procedure: arterial line, new line and elective       Laterality: right       Insertion Site: brachial.  Sterile Prep        Standard elements of sterile barrier followed       Skin prep: Chloraprep  Insertion/Injection        Technique: Seldinger Technique and ultrasound guided        1. Ultrasound was used to evaluate the access site.       2. Artery evaluated via ultrasound for patency/adequacy.       3. Using real-time ultrasound the needle/catheter was observed entering the artery/vein.       4. Permanent image was captured and entered into the patient's record.       5. The visualized structures were anatomically normal.       6. There were no apparent abnormal pathologic findings.       Catheter Type/Size: 20 G, 12 cm  Narrative         Secured by: suture       Tegaderm and Biopatch dressing used.       Complications: None apparent,        Arterial waveform: Yes    Comments:  Very small right radial on ultrasound; decision to cannulate brachial artery.

## 2024-02-14 NOTE — ANESTHESIA PROCEDURE NOTES
Central Line/PA Catheter Placement    Pre-Procedure   Staff -        Anesthesiologist:  Elfego Ardon MD       Performed By: anesthesiologist       Location: OR       Pre-Anesthestic Checklist: patient identified, IV checked, site marked, risks and benefits discussed, informed consent, monitors and equipment checked, pre-op evaluation and at physician/surgeon's request  Timeout:       Correct Patient: Yes        Correct Procedure: Yes        Correct Site: Yes        Correct Position: Yes        Correct Laterality: Yes   Line Placement:   This line was placed Post Induction starting at 2/14/2024 7:33 AM and ending at 2/14/2024 7:45 AM    Procedure   Procedure: central line       Laterality: right       Insertion Site: internal jugular.       Patient Position: Trendelenburg  Sterile Prep        All elements of maximal sterile barrier technique followed       Patient Prep/Sterile Barriers: draped, hand hygiene, gloves , hat , mask , draped, gown, sterile gel and probe cover       Skin prep: Chloraprep  Insertion/Injection        Technique: ultrasound guided and Seldinger Technique        1. Ultrasound was used to evaluate the access site.       2. Vein evaluated via ultrasound for patency/adequacy.       3. Using real-time ultrasound the needle/catheter was observed entering the artery/vein.       4. Permanent image was captured and entered into the patient's record.       5. The visualized structures were anatomically normal.       6. There were no apparent abnormal pathologic findings.       Introducer Type: 9 Fr, 2-lumen MAC        Type: PA/CVC with Introducer       Number of Lumens: double lumen  Narrative         Secured by: suture       Tegaderm and Biopatch dressing used.       Complications: None apparent,        blood aspirated from all lumens,        All lumens flushed: Yes       Verification method: Placement to be verified post-op and Ultrasound   Comments:  The catheter tip was placed under ultrasound  and resided in the SVC, as subsequently confirmed via chest X-ray.  DLIC placed through introducer.

## 2024-02-14 NOTE — PRE-PROCEDURE
Patient was seen and examined by me.  He does have bilateral groin involvement with yeast.  This will take months to clear up.  Therefore we will proceed with aortic valve replacement and coronary artery bypass grafting today.  Informed consent has been obtained.

## 2024-02-14 NOTE — ANESTHESIA CARE TRANSFER NOTE
Patient: Nick Zaldivar    Procedure: Procedure(s):  CORONARY ARTERY BYPASS GRAFT TIMES 3, LEFT INTERNAL MAMMARY ARTERY HARVEST, BILATERAL LEG ENDOSCOPIC VESSEL PROCUREMENT, EPIAORTIC ULTRASOUND  ANESTHESIA TRANSESOPHAGEAL ECHOCARDIOGRAM       Diagnosis: Coronary artery disease due to lipid rich plaque [I25.10, I25.83]  Diagnosis Additional Information: No value filed.    Anesthesia Type:   General     Note:    Oropharynx: endotracheal tube in place and ventilatory support  Level of Consciousness: unresponsive      Independent Airway: airway patency not satisfactory and stable  Dentition: dentition unchanged  Vital Signs Stable: post-procedure vital signs reviewed and stable  Report to RN Given: handoff report given  Patient transferred to: ICU  Comments: To icu with all monitors on. 100% O2 via ambu/ETT. Placed on vent per RT  ICU Handoff: Call for PAUSE to initiate/utilize ICU HANDOFF, Identified Patient, Identified Responsible Provider, Reviewed the Pertinent Medical History, Discussed Surgical Course, Reviewed Intra-OP Anesthesia Management and Issues during Anesthesia, Set Expectations for Post Procedure Period and Allowed Opportunity for Questions and Acknowledgement of Understanding      Vitals:  Vitals Value Taken Time   BP     Temp     Pulse 89 02/14/24 1252   Resp     SpO2 100 % 02/14/24 1252   Vitals shown include unfiled device data.    Electronically Signed By: YODIT Lawton CRNA  February 14, 2024  12:54 PM

## 2024-02-14 NOTE — OP NOTE
OPERATIVE REPORT     OPERATING ROOM:  Room 8    February 14, 2024    PROCEDURES PERFORMED:   Median sternotomy   Take down of the left internal mammary artery    Endoscopic greater saphenous vein procurement from the right lower extremity and the left thigh    Epiaortic ultrasound of the ascending aorta    Placement on central cardiopulmonary bypass    Triple vessel coronary artery bypass grafting;   -  Separate reversed saphenous vein grafts to right posterior descending, the obtuse marginal 1 and the left anterior descending diagonal branch coronary arteries.    Placement of temporary atrial and ventricular pacing wires     SURGEONS:    Attending Surgeon:  Carmelina Flanagan MD  First Assistant: Chel Abebe. STSAC  Resident Surgeon: Pan Toribio MD     ANESTHESIA:  General endotracheal    SKIN PREP:  Betadine and Duraprep    INCISION:  Median sternotomy, skip incision right and left upper legs     DRAINS: One 32 Fr mediastinal and one 24 Fr Carlos drain left pleural spaces   CULTURES: None  SPECIMENS: None  CLOSURE: Routine     PREOPERATIVE DIAGNOSES:  Dyspnea on exertion  Moderate aortic stenosis  Severe multivessel coronary artery disease  Preserved left ventricular function    POSTOPERATIVE DIAGNOSES:  Same     BRIEF HISTORY:    Nick Zaldivar is a 78 year old year old gentleman who presented with dyspnea on exertion.  He was found to have severe multivessel disease on coronary angiogram.  He also has moderate aortic stenosis and we planned to perform an aortic valve replacement as well.     FINDINGS AT OPERATION:  He did have preserved left ventricular function with some left ventricular hypertrophy.   His ascending aorta had significant plaque involvement of the anterior wall so that cannulation was done over to the right side and the aortic cross-clamp was placed much more proximally than usual.  The left internal mammary artery was a 2 mm in diameter conduit with excellent flow, it was not used.  The  reversed saphenous vein graft measured 4 to 5 mm in diameter and was of good quality.  The right posterior descending coronary artery was a 2.5 in diameter vessel distal to a stent, an excellent vessel for bypass grafting.  The obtuse marginal 1 was a 2 mm in diameter target vessel, an excellent vessel for bypass grafting.  The left anterior descending diagonal branch was a 1.5 mm in diameter target vessel, an excellent vessel for bypass grafting.  The left anterior descending coronary artery could not be located despite the use of ultrasound and extensive dissection of the anterior wall of the heart.     PROCEDURES:  The patient arrived in the operating room and was positioned supine.  An arterial line was placed.  Satisfactory general anesthesia was induced.  A transesophageal probe, central line and Salazar catheter were inserted.  The patient's neck, chest, abdomen, both groins and lower extremities, and the left upper extremity were prepped and draped in a standard sterile fashion.      A complete median sternotomy was made.  With the aid of the Rultract retractor, the left internal mammary artery was taken down from the xiphoid process to the subclavian vein.  At the same time Chel Abebe  made an incision in the right upper leg and 5 cm of the greater saphenous vein was exposed. The endoscope was then passed proximally and distally and the greater saphenous vein was dissected out circumferentially.  Its branches were clipped or cauterized.  It was clipped proximally and distally and extracted. It was distended and its branches were controlled with Ligaclips.  The leg wound was rendered hemostatic and closed in layers using running 2-0 and 3-0 Vicryl.  Dermabond was applied to the skin. The exact same procedure was done on the left lower extremity but only the thigh vein was taken as some of the vein below the knee from the right lower extremity was not usable.    Heparin was administered.  The left internal  mammary artery was prepared in the usual fashion.  A Guy retractor was inserted.  The pericardium was opened and tented up on pericardial sutures.  The aorta was  from the pulmonary artery.  Epiaortic ultrasound of the ascending aorta was carried out.  Pursestring sutures were placed in the ascending aorta and right atrium for cannulation.  When the ACT was appropriate cannulation was performed and central cardiopulmonary bypass was established.  The patient's temperature was allowed to drift.  A retrograde cardioplegia catheter was placed in the coronary sinus via the right atrium.  A left ventricular vent was placed via the right superior pulmonary vein. The aorta was cross-clamped and 700 mL of cold blood cardioplegia was administered antegrade and an additional 500mL of cold blood cardioplegia was administered retrograde.  A good arrest was achieved.  Cold topical saline and slush was applied to the heart intermittently during the period of aortic cross-clamp.  Upon examination of the ascending aorta it was decided that aortic valve replacement could not be done with the amount of ascending aorta available.    Attention was then turned to the right posterior descending coronary artery.  It was dissected out for a distance of 1 cm and then opened for a distance of 8 mm.  It was bypassed in an end to side fashion using reversed saphenous vein graft and running 7-0 prolene.  The vein graft was flushed with cold blood cardioplegia and sized to the ascending aorta. The patient received 500 mL of cold blood cardioplegia in a retrograde fashion. Next attention was turned to the obtuse marginal 1 coronary artery.  It was dissected out for a distance of 1 cm and then opened for a distance of 8 mm.  It was bypassed in an end to side fashion using reversed saphenous vein graft and running 7-0 prolene.  The vein graft was flushed with cold blood cardioplegia and sized to the ascending aorta and the patient  received another 400 mL of cold blood cardioplegia in a retrograde fashion. Next attention was turned to the left anterior descending diagonal branch.  It was dissected out for a distance of 1 cm and then opened for a distance of 8 mm.  It was bypassed in an end to side fashion using reversed saphenous vein graft and running 7-0 prolene.  The vein graft was flushed with cold blood cardioplegia and sized to the ascending aorta and the patient received another 400 mL of cold blood cardioplegia in a retrograde fashion.  Finally attention was turned to the left anterior descending coronary artery in the interventricular groove.  A pleural rent was created for passage of the left internal mammary artery and then we spent 40 minutes trying to locate the left anterior descending coronary artery.  There was a substantial amount of epicardial fat and despite extensive dissection and the use of ultrasound it could not be located.   The patient received a final dose of cold blood cardioplegia in a retrograde fashion.  It had been decided to perform the 3 proximal anastomoses with the aortic cross-clamp in place.  Therefore  3, 4 mm punch aortotomies were created.  The proximal aortosaphenous anastomoses were performed in an end to side fashion using running 6-0 prolene.     A hot shot was delivered in a retrograde fashion.  The aortic cross-clamp was released.  The aortic cross-clamp time was 1 hour and 49 minutes.  The proximal and distal anastomoses were examined and found to be hemostatic.  Ventricular and atrial pacing wires were applied and the patient was A-V sequentially paced at a rate of 90.  The retrograde cardioplegia catheter was removed and that site repaired with two sets of 4-0 prolene.  The left pleural space was drained of any accumulated blood and gentle ventilation resumed. The root vent was removed and that site repaired with plegetted 4-0 prolene.  The left internal mammary artery was tied off with 2-0  Ethibond and an orange clip.    The patient was placed on low dose epinephrine.  When he was warm the heart was allowed to fill and eject and the patient  from cardiopulmonary bypass without difficulty. Total time on cardiopulmonary bypass was  2  hours and 9 minutes.  Protamine was administered to reverse the heparin.  The patient was decannulated and the cannulation sites were repaired with 4-0 prolene.  Hemostasis was satisfactory.    The drains were placed and secured to the skin. The sternum was approximated with a combination of single and double stainless steel sternal wires.   The sternal wound was irrigated with warm antibiotic-containing solution.  It was closed in 4 layers using 0 Stratafix for 2 layers, 2-0 Stratafix for the next layer and a subcuticular stitch of 4-0 Monocryl.      The sponge, needle and instrument counts were reported as correct.      The estimated blood loss was 600 mL.      Nick Zaldivar was brought to the Intensive Care Unit in critical but stable condition.    Complications: None     Carmelina Flanagan MD on 2/14/2024 at 12:52 PM

## 2024-02-14 NOTE — PHARMACY-ADMISSION MEDICATION HISTORY
Pharmacist Admission Medication History    Admission medication history is complete. The information provided in this note is only as accurate as the sources available at the time of the update.    Information Source(s): Patient via in-person    Pertinent Information: Patient stopped taking ibuprofen and aspirin a week ago in prep for his procedure.    Changes made to PTA medication list:  Added: Clotrimazole  Deleted: None  Changed: None    Allergies reviewed with patient and updates made in EHR: yes    Medication History Completed By: Keegan Moran [facility] 2/14/2024 6:25 AM    PTA Med List   Medication Sig Last Dose    aspirin 81 MG EC tablet [ASPIRIN 81 MG EC TABLET] Take 81 mg by mouth daily. 2/6/2024 at am    clotrimazole (LOTRIMIN) 1 % external cream Apply topically 2 times daily 2/13/2024 at PM    furosemide (LASIX) 20 MG tablet Take 2 tablets (40 mg) by mouth 2 times daily 2/13/2024 at PM    ibuprofen (ADVIL/MOTRIN) 400 MG tablet Take 400 mg by mouth once as needed for moderate pain Past Month at PRN    isosorbide mononitrate (IMDUR) 30 MG 24 hr tablet Take 1 tablet (30 mg) by mouth daily 2/13/2024 at AM    lisinopril (ZESTRIL) 40 MG tablet Take 40 mg by mouth daily 2/13/2024 at AM    metoprolol tartrate (LOPRESSOR) 25 MG tablet Take 0.5 tablets (12.5 mg) by mouth 2 times daily 2/14/2024 at AM    multivitamin w/minerals (THERA-VIT-M) tablet Take 1 tablet by mouth daily 2/6/2024 at am    nitroGLYcerin (NITROSTAT) 0.4 MG sublingual tablet See Admin Instructions  at PRN    rosuvastatin (CRESTOR) 40 MG tablet TAKE 1 TABLET BY MOUTH DAILY 2/13/2024 at PM

## 2024-02-14 NOTE — BRIEF OP NOTE
[facility]    Brief Operative Note    Pre-operative diagnosis: Coronary artery disease due to lipid rich plaque [I25.10, I25.83]  Post-operative diagnosis Same as pre-operative diagnosis    Procedure: CORONARY ARTERY BYPASS GRAFT TIMES 3, LEFT INTERNAL MAMMARY ARTERY HARVEST, BILATERAL LEG ENDOSCOPIC VESSEL PROCUREMENT, EPIAORTIC ULTRASOUND, N/A - Chest  ANESTHESIA TRANSESOPHAGEAL ECHOCARDIOGRAM, N/A - Heart    Surgeon: Surgeon(s) and Role:     * Carmelina Flanagan MD - Primary     * Pan Toribio MD - Resident - Assisting  Anesthesia: General   Estimated Blood Loss: 500 ml    Drains: One in the left pleural space, on mediastinal  Specimens: * No specimens in log *  Findings:   RSVG to Diag, OM, and rPDA, LAD was unable to be found intramyocardially and therefore LIMA was not used .  Complications: None.  Implants: * No implants in log *    Signed:    Pan Toribio MD 2/14/2024 at 12:09 PM  Cardiothoracic Surgery Fellow  Pager: [phone]

## 2024-02-14 NOTE — ANESTHESIA POSTPROCEDURE EVALUATION
Patient: Nick Zaldivar    Procedure: Procedure(s):  CORONARY ARTERY BYPASS GRAFT TIMES 3, LEFT INTERNAL MAMMARY ARTERY HARVEST, BILATERAL LEG ENDOSCOPIC VESSEL PROCUREMENT, EPIAORTIC ULTRASOUND  ANESTHESIA TRANSESOPHAGEAL ECHOCARDIOGRAM       Anesthesia Type:  General    Note:  Disposition: Admission; ICU            ICU Sign Out: Anesthesiologist/ICU physician sign out WAS performed   Postop Pain Control: Uneventful            Sign Out: Well controlled pain   PONV: No   Neuro/Psych: Uneventful            Sign Out: PLANNED postop sedation   Airway/Respiratory: Uneventful            Sign Out: AIRWAY IN SITU/Resp. Support               Airway in situ/Resp. Support: ETT                 Reason: Planned Pre-op   CV/Hemodynamics: Uneventful            Sign Out: Acceptable CV status; No obvious hypovolemia; No obvious fluid overload   Other NRE: NONE   DID A NON-ROUTINE EVENT OCCUR?            Last vitals:  Vitals:    02/14/24 1300 02/14/24 1316 02/14/24 1328   BP:  (!) 158/79    Pulse: 89 89 89   Resp:      Temp:      SpO2: 100% 100% 100%       Electronically Signed By: Elfego Ardon MD  February 14, 2024  1:29 PM

## 2024-02-14 NOTE — ANESTHESIA PROCEDURE NOTES
Perioperative VASU Procedure Note    Staff -        Anesthesiologist:  Elfego Ardon MD       Performed By: anesthesiologist  Preanesthesia Checklist:  Patient identified, IV assessed, risks and benefits discussed, monitors and equipment assessed, procedure being performed at surgeon's request and anesthesia consent obtained.    VASU Probe Insertion    Probe Status PRE Insertion: NO obvious damage  Probe type:  Adult 3D  Bite block used:   Soft  Insertion Technique: Easy, no oropharyngeal manipulation  Insertion complications: None obvious  Billing Report:VASU report by Anesthesiologist (See Separate Report note)  Probe Status POST Removal: NO obvious damage    VASU Report  General Procedure Information  Images for this study have been archived.  Modalities: 2D, 3D, CW Doppler, PW Doppler and Color flow mapping  Echocardiographic and Doppler Measurements  Right Ventricle:  Cavity size normal.       Global function normal.     Left Ventricle:  Cavity size normal.      Global Function normal.         Valves  Aortic Valve: Annulus normal.  Stenosis moderate.  Leaflets calcified.  Leaflet motions restricted.    Mitral Valve: Regurgitation +1.  Leaflets normal.  Leaflet motions normal.    Tricuspid Valve: Annulus normal.  Regurgitation absent.  Leaflets normal.  Leaflet motions normal.    Pulmonic Valve: Regurgitation +1.      Aorta: Descending Aorta: Size normal.          Right Atrium:  Size normal.       Left Atrium: Size normal.  Left atrial appendage normal.     Atrial Septum: Intra-atrial septal morphology normal.         Diastolic Function Measurements:  Diastolic Dysfunction Grade= I.  E=  ms.  A=  ms.  E/A Ratio= .  DT=  ms.  S/D= .  IVRT= ms.  Other Findings:   Pericardium:  normal. Pleural Effusion:  none. Pulmonary Arteries:  normal. Pulmonary Venous Flow:  normal.     Post Intervention Findings  Procedure(s) performed:  CABG. Global function:  Unchanged. Regional wall motion: Unchanged. Surgeon(s) notified of  all postintervention findings: Yes.                 Echocardiogram Comments

## 2024-02-14 NOTE — ANESTHESIA PROCEDURE NOTES
Airway       Patient location during procedure: OR       Procedure Start/Stop Times: 2/14/2024 7:31 AM  Staff -        CRNA: Randy Jerome APRN CRNA       Performed By: CRNA  Consent for Airway        Urgency: elective  Indications and Patient Condition       Indications for airway management: allyn-procedural       Induction type:intravenous       Mask difficulty assessment: 1 - vent by mask    Final Airway Details       Final airway type: endotracheal airway       Successful airway: ETT - single  Endotracheal Airway Details        ETT size (mm): 7.5       Cuffed: yes       Successful intubation technique: direct laryngoscopy       DL Blade Type: Higuera 2       Grade View of Cords: 1       Adjucts: stylet       Position: Right       Measured from: lips       Secured at (cm): 22       Bite block used: None    Post intubation assessment        Placement verified by: capnometry, equal breath sounds and chest rise        Number of attempts at approach: 1       Number of other approaches attempted: 0       Secured with: tape       Ease of procedure: easy       Dentition: Intact and Unchanged    Medication(s) Administered   Medication Administration Time: 2/14/2024 7:31 AM

## 2024-02-15 ENCOUNTER — APPOINTMENT (OUTPATIENT)
Dept: OCCUPATIONAL THERAPY | Facility: HOSPITAL | Age: 79
DRG: 236 | End: 2024-02-15
Attending: THORACIC SURGERY (CARDIOTHORACIC VASCULAR SURGERY)
Payer: MEDICARE

## 2024-02-15 ENCOUNTER — APPOINTMENT (OUTPATIENT)
Dept: RADIOLOGY | Facility: HOSPITAL | Age: 79
DRG: 236 | End: 2024-02-15
Attending: NURSE PRACTITIONER
Payer: MEDICARE

## 2024-02-15 DIAGNOSIS — Z95.1 S/P CABG (CORONARY ARTERY BYPASS GRAFT): Primary | ICD-10-CM

## 2024-02-15 LAB
ALBUMIN SERPL BCG-MCNC: 3.5 G/DL (ref 3.5–5.2)
ALP SERPL-CCNC: 31 U/L (ref 40–150)
ALT SERPL W P-5'-P-CCNC: 20 U/L (ref 0–70)
ANION GAP SERPL CALCULATED.3IONS-SCNC: 11 MMOL/L (ref 7–15)
AST SERPL W P-5'-P-CCNC: 45 U/L (ref 0–45)
BILIRUB SERPL-MCNC: 0.7 MG/DL
BUN SERPL-MCNC: 25.2 MG/DL (ref 8–23)
CA-I BLD-MCNC: 4.4 MG/DL (ref 4.4–5.2)
CA-I BLD-MCNC: 4.4 MG/DL (ref 4.4–5.2)
CALCIUM SERPL-MCNC: 7.8 MG/DL (ref 8.8–10.2)
CHLORIDE SERPL-SCNC: 114 MMOL/L (ref 98–107)
CREAT SERPL-MCNC: 1.29 MG/DL (ref 0.67–1.17)
DEPRECATED HCO3 PLAS-SCNC: 22 MMOL/L (ref 22–29)
EGFRCR SERPLBLD CKD-EPI 2021: 56 ML/MIN/1.73M2
ERYTHROCYTE [DISTWIDTH] IN BLOOD BY AUTOMATED COUNT: 13.1 % (ref 10–15)
GLUCOSE BLDC GLUCOMTR-MCNC: 116 MG/DL (ref 70–99)
GLUCOSE BLDC GLUCOMTR-MCNC: 117 MG/DL (ref 70–99)
GLUCOSE BLDC GLUCOMTR-MCNC: 124 MG/DL (ref 70–99)
GLUCOSE BLDC GLUCOMTR-MCNC: 126 MG/DL (ref 70–99)
GLUCOSE BLDC GLUCOMTR-MCNC: 126 MG/DL (ref 70–99)
GLUCOSE BLDC GLUCOMTR-MCNC: 130 MG/DL (ref 70–99)
GLUCOSE BLDC GLUCOMTR-MCNC: 130 MG/DL (ref 70–99)
GLUCOSE BLDC GLUCOMTR-MCNC: 137 MG/DL (ref 70–99)
GLUCOSE BLDC GLUCOMTR-MCNC: 137 MG/DL (ref 70–99)
GLUCOSE SERPL-MCNC: 120 MG/DL (ref 70–99)
HCT VFR BLD AUTO: 28.5 % (ref 40–53)
HGB BLD-MCNC: 9.4 G/DL (ref 13.3–17.7)
MAGNESIUM SERPL-MCNC: 2.6 MG/DL (ref 1.7–2.3)
MCH RBC QN AUTO: 29.9 PG (ref 26.5–33)
MCHC RBC AUTO-ENTMCNC: 33 G/DL (ref 31.5–36.5)
MCV RBC AUTO: 91 FL (ref 78–100)
PHOSPHATE SERPL-MCNC: 4.7 MG/DL (ref 2.5–4.5)
PLATELET # BLD AUTO: 167 10E3/UL (ref 150–450)
POTASSIUM SERPL-SCNC: 4.3 MMOL/L (ref 3.4–5.3)
PROT SERPL-MCNC: 5 G/DL (ref 6.4–8.3)
RBC # BLD AUTO: 3.14 10E6/UL (ref 4.4–5.9)
SODIUM SERPL-SCNC: 147 MMOL/L (ref 135–145)
WBC # BLD AUTO: 14.7 10E3/UL (ref 4–11)

## 2024-02-15 PROCEDURE — 84100 ASSAY OF PHOSPHORUS: CPT | Performed by: NURSE PRACTITIONER

## 2024-02-15 PROCEDURE — 82330 ASSAY OF CALCIUM: CPT | Performed by: THORACIC SURGERY (CARDIOTHORACIC VASCULAR SURGERY)

## 2024-02-15 PROCEDURE — 250N000013 HC RX MED GY IP 250 OP 250 PS 637: Performed by: NURSE PRACTITIONER

## 2024-02-15 PROCEDURE — 250N000009 HC RX 250: Performed by: NURSE PRACTITIONER

## 2024-02-15 PROCEDURE — 258N000003 HC RX IP 258 OP 636: Performed by: NURSE PRACTITIONER

## 2024-02-15 PROCEDURE — 97165 OT EVAL LOW COMPLEX 30 MIN: CPT | Mod: GO

## 2024-02-15 PROCEDURE — 250N000011 HC RX IP 250 OP 636: Performed by: NURSE PRACTITIONER

## 2024-02-15 PROCEDURE — 94799 UNLISTED PULMONARY SVC/PX: CPT

## 2024-02-15 PROCEDURE — 71045 X-RAY EXAM CHEST 1 VIEW: CPT

## 2024-02-15 PROCEDURE — 999N000157 HC STATISTIC RCP TIME EA 10 MIN

## 2024-02-15 PROCEDURE — P9047 ALBUMIN (HUMAN), 25%, 50ML: HCPCS | Performed by: PHYSICIAN ASSISTANT

## 2024-02-15 PROCEDURE — 97110 THERAPEUTIC EXERCISES: CPT | Mod: GO

## 2024-02-15 PROCEDURE — 82330 ASSAY OF CALCIUM: CPT | Performed by: NURSE PRACTITIONER

## 2024-02-15 PROCEDURE — 85048 AUTOMATED LEUKOCYTE COUNT: CPT | Performed by: NURSE PRACTITIONER

## 2024-02-15 PROCEDURE — 250N000011 HC RX IP 250 OP 636: Performed by: THORACIC SURGERY (CARDIOTHORACIC VASCULAR SURGERY)

## 2024-02-15 PROCEDURE — 80053 COMPREHEN METABOLIC PANEL: CPT | Performed by: NURSE PRACTITIONER

## 2024-02-15 PROCEDURE — 83735 ASSAY OF MAGNESIUM: CPT | Performed by: NURSE PRACTITIONER

## 2024-02-15 PROCEDURE — 200N000001 HC R&B ICU

## 2024-02-15 PROCEDURE — 250N000012 HC RX MED GY IP 250 OP 636 PS 637: Performed by: PHYSICIAN ASSISTANT

## 2024-02-15 PROCEDURE — 250N000011 HC RX IP 250 OP 636: Performed by: PHYSICIAN ASSISTANT

## 2024-02-15 RX ORDER — NICOTINE POLACRILEX 4 MG
15-30 LOZENGE BUCCAL
Status: DISCONTINUED | OUTPATIENT
Start: 2024-02-15 | End: 2024-02-15

## 2024-02-15 RX ORDER — DEXTROSE MONOHYDRATE 25 G/50ML
25-50 INJECTION, SOLUTION INTRAVENOUS
Status: DISCONTINUED | OUTPATIENT
Start: 2024-02-15 | End: 2024-02-15

## 2024-02-15 RX ORDER — ALBUMIN (HUMAN) 12.5 G/50ML
12.5 SOLUTION INTRAVENOUS ONCE
Status: COMPLETED | OUTPATIENT
Start: 2024-02-15 | End: 2024-02-15

## 2024-02-15 RX ORDER — NALOXONE HYDROCHLORIDE 0.4 MG/ML
0.2 INJECTION, SOLUTION INTRAMUSCULAR; INTRAVENOUS; SUBCUTANEOUS
Status: DISCONTINUED | OUTPATIENT
Start: 2024-02-15 | End: 2024-02-22 | Stop reason: HOSPADM

## 2024-02-15 RX ORDER — NALOXONE HYDROCHLORIDE 0.4 MG/ML
0.4 INJECTION, SOLUTION INTRAMUSCULAR; INTRAVENOUS; SUBCUTANEOUS
Status: DISCONTINUED | OUTPATIENT
Start: 2024-02-15 | End: 2024-02-22 | Stop reason: HOSPADM

## 2024-02-15 RX ADMIN — ROSUVASTATIN CALCIUM 40 MG: 40 TABLET, COATED ORAL at 21:06

## 2024-02-15 RX ADMIN — ASPIRIN 81 MG CHEWABLE TABLET 162 MG: 81 TABLET CHEWABLE at 08:27

## 2024-02-15 RX ADMIN — INSULIN GLARGINE 4 UNITS: 100 INJECTION, SOLUTION SUBCUTANEOUS at 15:31

## 2024-02-15 RX ADMIN — CALCIUM GLUCONATE 1 G: 20 INJECTION, SOLUTION INTRAVENOUS at 18:12

## 2024-02-15 RX ADMIN — CEFAZOLIN SODIUM 2 G: 2 INJECTION, SOLUTION INTRAVENOUS at 04:00

## 2024-02-15 RX ADMIN — SODIUM CHLORIDE, POTASSIUM CHLORIDE, SODIUM LACTATE AND CALCIUM CHLORIDE 250 ML: 600; 310; 30; 20 INJECTION, SOLUTION INTRAVENOUS at 17:31

## 2024-02-15 RX ADMIN — CALCIUM GLUCONATE 1 G: 20 INJECTION, SOLUTION INTRAVENOUS at 05:52

## 2024-02-15 RX ADMIN — Medication 0.5 MCG/KG/MIN: at 05:41

## 2024-02-15 RX ADMIN — SENNOSIDES AND DOCUSATE SODIUM 1 TABLET: 8.6; 5 TABLET ORAL at 08:27

## 2024-02-15 RX ADMIN — PANTOPRAZOLE SODIUM 40 MG: 40 TABLET, DELAYED RELEASE ORAL at 08:26

## 2024-02-15 RX ADMIN — NYSTATIN: 100000 CREAM TOPICAL at 21:07

## 2024-02-15 RX ADMIN — NYSTATIN: 100000 CREAM TOPICAL at 08:27

## 2024-02-15 RX ADMIN — POLYETHYLENE GLYCOL 3350 17 G: 17 POWDER, FOR SOLUTION ORAL at 08:27

## 2024-02-15 RX ADMIN — HYDROMORPHONE HYDROCHLORIDE 0.5 MG: 1 INJECTION, SOLUTION INTRAMUSCULAR; INTRAVENOUS; SUBCUTANEOUS at 19:27

## 2024-02-15 RX ADMIN — OXYCODONE HYDROCHLORIDE 5 MG: 5 TABLET ORAL at 14:34

## 2024-02-15 RX ADMIN — LIDOCAINE 2 PATCH: 4 PATCH TOPICAL at 13:03

## 2024-02-15 RX ADMIN — OXYCODONE HYDROCHLORIDE 5 MG: 5 TABLET ORAL at 19:26

## 2024-02-15 RX ADMIN — HEPARIN SODIUM 5000 UNITS: 5000 INJECTION, SOLUTION INTRAVENOUS; SUBCUTANEOUS at 21:07

## 2024-02-15 RX ADMIN — ALBUMIN HUMAN 12.5 G: 0.25 SOLUTION INTRAVENOUS at 12:17

## 2024-02-15 RX ADMIN — HEPARIN SODIUM 5000 UNITS: 5000 INJECTION, SOLUTION INTRAVENOUS; SUBCUTANEOUS at 13:13

## 2024-02-15 RX ADMIN — CEFAZOLIN SODIUM 2 G: 2 INJECTION, SOLUTION INTRAVENOUS at 12:50

## 2024-02-15 RX ADMIN — SENNOSIDES AND DOCUSATE SODIUM 1 TABLET: 8.6; 5 TABLET ORAL at 19:27

## 2024-02-15 RX ADMIN — ACETAMINOPHEN 975 MG: 325 TABLET ORAL at 05:12

## 2024-02-15 RX ADMIN — OXYCODONE HYDROCHLORIDE 5 MG: 5 TABLET ORAL at 08:27

## 2024-02-15 RX ADMIN — ACETAMINOPHEN 975 MG: 325 TABLET ORAL at 21:06

## 2024-02-15 RX ADMIN — ACETAMINOPHEN 975 MG: 325 TABLET ORAL at 12:21

## 2024-02-15 ASSESSMENT — ACTIVITIES OF DAILY LIVING (ADL)
ADLS_ACUITY_SCORE: 27
ADLS_ACUITY_SCORE: 34
ADLS_ACUITY_SCORE: 34
ADLS_ACUITY_SCORE: 32
ADLS_ACUITY_SCORE: 32
DEPENDENT_IADLS:: INDEPENDENT
ADLS_ACUITY_SCORE: 32
ADLS_ACUITY_SCORE: 32
ADLS_ACUITY_SCORE: 28
ADLS_ACUITY_SCORE: 32
ADLS_ACUITY_SCORE: 32
ADLS_ACUITY_SCORE: 28
ADLS_ACUITY_SCORE: 32

## 2024-02-15 NOTE — PROGRESS NOTES
Weaning trials x2 done.  PT on below vent settings and is currently waking up and weaning:    Vent Mode: CPAP/PS  (Continuous positive airway pressure with Pressure Support)  FiO2 (%): 30 %  Resp Rate (Set): 18 breaths/min  Tidal Volume (Set, mL): 600 mL  PEEP (cm H2O): 5 cmH2O  Pressure Support (cm H2O): 5 cmH2O  Resp: 24    Renny Finley, RT  2/14/2024

## 2024-02-15 NOTE — PLAN OF CARE
Pt remains 100% AV paced and on 0.5mcg/kg/min Phenyl gtt. CI stable 2.5-3.0. CT draining dark red output, 20-30cc's/hr with additional 30-50 ml's out after standing. Fever of 101* last joey now resolved with T 99.2*   Pain controlled with scheduled tylenol. Uses IS to 750 mls and has fair, dry, nonproductive cough.    Pt stood well this am and stepped up onto scale; then passed out and had to be pushed into chair with heavy assist of 3. Fall prevented by staff. Pt then regained consciousness and denies complaints.

## 2024-02-15 NOTE — PROGRESS NOTES
Pulmonary/Critical Care  2/15/2024  10:35 AM     Chart reviewed.   Extubated last evening and doing well this morning. Able to transition to low flow NC O2 from HFNC without difficulty.  Up in chair and working with cardiac rehab this morning.     Our service will sign off.     Odessa Morales CNP  [facility] Pulmonary/Critical Care

## 2024-02-15 NOTE — PROGRESS NOTES
02/15/24 0830   Appointment Info   Signing Clinician's Name / Credentials (OT) Jayde Mccall OTR/L   Living Environment   People in Home spouse;child(jonelle), adult  (son and daughter)   Current Living Arrangements house   Home Accessibility stairs to enter home;stairs within home   Number of Stairs, Main Entrance 3   Stair Railings, Main Entrance railings safe and in good condition   Number of Stairs, Within Home, Primary greater than 10 stairs   Stair Railings, Within Home, Primary railings safe and in good condition   Transportation Anticipated family or friend will provide   Living Environment Comments supportive living environment.  Lives with wife and adult daughter and son.  Son has had previous strokes and is not able to help with IADLs but is independent.  Full flight of stairs up to bedroom.  Bathroom on that level so once up stairs pt can stay on that level if needed   Self-Care   Usual Activity Tolerance good   Current Activity Tolerance moderate   Regular Exercise Yes   Activity/Exercise Type walking   Fall history within last six months no   Activity/Exercise/Self-Care Comment independent with self cares.  walks dog 3 x per week at the dog park   Instrumental Activities of Daily Living (IADL)   Previous Responsibilities meal prep;housekeeping;shopping;yardwork;medication management;finances;driving;laundry   IADL Comments independent.  Shares responsibilities   General Information   Onset of Illness/Injury or Date of Surgery 02/14/24   Referring Physician Mary Forde   Patient/Family Therapy Goal Statement (OT) pt would like to return home at discharge.   Additional Occupational Profile Info/Pertinent History of Current Problem per chart: CORONARY ARTERY BYPASS GRAFT TIMES 3,   Existing Precautions/Restrictions sternal;cardiac   Right Upper Extremity (Weight-bearing Status) partial weight-bearing (PWB)   Left Lower Extremity (Weight-bearing Status) partial weight-bearing (PWB)   General  Observations and Info lines and tubes in .  following sternal precautions with some cuing.  participated in session.   Cognitive Status Examination   Affect/Mental Status (Cognitive) WFL   Strength Comprehensive (MMT)   Comment, General Manual Muscle Testing (MMT) Assessment WFL   Transfers   Transfers sit-stand transfer   Sit-Stand Transfer   Sit-Stand Andrews (Transfers) minimum assist (75% patient effort);moderate assist (50% patient effort);1 person to manage equipment;2 person assist   Sit/Stand Transfer Comments increase effort due to lines.  Experienced dizziness and hypotensive response with standing.   Clinical Impression   Criteria for Skilled Therapeutic Interventions Met (OT) Yes, treatment indicated   OT Diagnosis reduced funcitonal mobility   Influenced by the following impairments CABG   OT Problem List-Impairments impacting ADL problems related to;activity tolerance impaired;balance;mobility;post-surgical precautions   Assessment of Occupational Performance 1-3 Performance Deficits   Planned Therapy Interventions (OT) ADL retraining;bed mobility training;strengthening;transfer training;home program guidelines;progressive activity/exercise   Clinical Decision Making Complexity (OT) problem focused assessment/low complexity   Risk & Benefits of therapy have been explained evaluation/treatment results reviewed;care plan/treatment goals reviewed;risks/benefits reviewed;current/potential barriers reviewed;participants voiced agreement with care plan;participants included;patient   Clinical Impression Comments pt would benefit from further therapy.  motivated to progress   OT Total Evaluation Time   OT Eval, Low Complexity Minutes (22980) 12   OT Goals   Therapy Frequency (OT) 2 times/day   OT Predicted Duration/Target Date for Goal Attainment 02/21/24   OT Goals Cardiac Phase 1   OT: Understanding of cardiac education to maximize quality of life, condition management, and health outcomes  Patient;Verbalize;Demonstrate   OT: Perform aerobic activity with stable cardiovascular response intermittent;10 minutes;ambulation;NuStep   OT: Functional/aerobic ambulation tolerance with stable cardiovascular response in order to return to home and community environment Modified independent;Within precautions;Greater than 300 feet   OT: Navigation of stairs simulating home set up with stable cardiovascular response in order to return to home and community environment Supervision/SBA;Greater than 10 stairs   OT Discharge Planning   OT Plan gallo walk when lines out   OT Discharge Recommendation (DC Rec) home with outpatient cardiac rehab   OT Rationale for DC Rec good support at home.  Needs to be able to climb 1 flight of stairs to return   OT Brief overview of current status min-mod sit to stand with hypotensive response.   Total Session Time   Total Session Time (sum of timed and untimed services) 12

## 2024-02-15 NOTE — PROGRESS NOTES
Patient weaned well on PS 5/5 30%. Ok'd to extubater per MD. Extubated to 6LPM nasal cannula. Now of HFNC per MD. Current settings are 35LPM 50%. Tolerating well. Flutter valve done. Achieved about 500ml per RN.  RT will continue to monitor.

## 2024-02-15 NOTE — PLAN OF CARE
[facility] - ICU    RN Progress Note:            Pertinent Assessments:      Please refer to flowsheet rows for full assessment     Pt dangled at bedside and was able to stand briefly with heavy assist of 2.   ART line is positional and cuff BP runs 10-15 pts higher.           Key Events - This Shift:       Extubated at 2015, remains on HFNC 50% and 35L        SJN SAT (Sedation Awakening Trial): For use ONLY if intubated    SAT Safety Screen PASSED   If FAILED why?    SAT Performed PASSED   If FAILED why?               Barriers to Discharge / Downgrade:     Remains on Chris gtt, Insulin gtt.         Point of Contact Update YES-OR-NO: No  If No, reason: Not present

## 2024-02-15 NOTE — CONSULTS
Care Management Initial Consult    General Information  Assessment completed with: PatientHai  Type of CM/SW Visit: Initial Assessment    Primary Care Provider verified and updated as needed: Yes   Readmission within the last 30 days: no previous admission in last 30 days         Advance Care Planning: Advance Care Planning Reviewed: present on chart          Communication Assessment  Patient's communication style: spoken language (English or Bilingual)    Hearing Difficulty or Deaf: yes   Wear Glasses or Blind: no    Cognitive  Cognitive/Neuro/Behavioral: WDL  Level of Consciousness: alert  Arousal Level: opens eyes spontaneously  Orientation: oriented x 4  Mood/Behavior: calm, cooperative  Best Language: 0 - No aphasia  Speech: hoarse, logical, spontaneous    Living Environment:   People in home: spouse  Wife Coretta, Daughter Patricia, and son Remy  Current living Arrangements: house      Able to return to prior arrangements: yes       Family/Social Support:  Care provided by: self  Provides care for: no one  Marital Status:   Wife  Coretta       Description of Support System: Supportive         Current Resources:   Patient receiving home care services: No     Community Resources: None  Equipment currently used at home: none  Supplies currently used at home: None    Employment/Financial:  Employment Status: retired        Financial Concerns:             Does the patient's insurance plan have a 3 day qualifying hospital stay waiver?  No    Lifestyle & Psychosocial Needs:  Social Determinants of Health     Food Insecurity: Not on file   Depression: Not on file   Housing Stability: Not on file   Tobacco Use: Medium Risk (2/14/2024)    Patient History     Smoking Tobacco Use: Former     Smokeless Tobacco Use: Never     Passive Exposure: Not on file   Financial Resource Strain: Not on file   Alcohol Use: Not on file   Transportation Needs: Not on file   Physical Activity: Not on file   Interpersonal Safety: Not  on file   Stress: Not on file   Social Connections: Not on file       Functional Status:  Prior to admission patient needed assistance:   Dependent ADLs:: Independent  Dependent IADLs:: Independent       Mental Health Status:          Chemical Dependency Status:                Values/Beliefs:  Spiritual, Cultural Beliefs, Yazidism Practices, Values that affect care:                 Additional Information:  Assessed. CABG 2/14. RNCM introduced self and CM role to pt. Pt lives with spouse Coretta, son Remy, and Daughter Patricia in a house. Pt Ind with ADLS and IADLS. No home care svcs. No mobility aides. Retired. Drives. Therapy recs home with OP CR,which patient is agreeable and would prefer [place] location if possible. Family to transport.         Cm will continue to follow plan of care, review recommendations,and assist with any discharge needs anticipated.       Eva Smith RN

## 2024-02-15 NOTE — PROGRESS NOTES
"CVTS Daily Progress Note   POD#1 s/p CAB x 3(Separate reversed saphenous vein grafts to right posterior descending, the obtuse marginal 1 and the left anterior descending diagonal branch coronary arteries)  Attending: Dr. Flanagan  LOS: 1    SUBJECTIVE/INTERVAL EVENTS:    Patient arrived to ICU from OR yesterday afternoon. He was subsequently extubated and weaning from pressors. NSR with paced rate at 90. No acute events overnight however, he became lighted and fainted this am while getting up to the scale. He was quickly placed in the chair and recovered. Patient progressing. Maintaining oxygen saturations on 4L NC(HFNC overnight). Normotensive on Chris 0.3. Up to chair this am. Pain well controlled. -BM / flatus. Tolerating clear liquid diet . UOP adequate. Chest tube output appropriate. Hgb 9.4. Patient denies new chest pain, shortness of breath, abdominal pain, calf pain, nausea. Patient has no questions today.     OBJECTIVE:  Temp:  [97.1  F (36.2  C)-101  F (38.3  C)] 99.1  F (37.3  C)  Pulse:  [89-90] 89  Resp:  [18-28] 18  BP: ()/(50-79) 106/56  MAP:  [46 mmHg-101 mmHg] 76 mmHg  Arterial Line BP: ()/(38-75) 112/55  FiO2 (%):  [30 %-100 %] 40 %  SpO2:  [90 %-100 %] 97 %  Vitals:    02/14/24 0524 02/15/24 0525   Weight: 104.8 kg (231 lb) 104.1 kg (229 lb 9.6 oz)       Clinically Significant Risk Factors         # Hypernatremia: Highest Na = 147 mmol/L in last 2 days, will monitor as appropriate  # Hypocalcemia: Lowest iCa = 4 mg/dL in last 2 days, will monitor and replace as appropriate     # Hypoalbuminemia: Lowest albumin = 3 g/dL at 2/14/2024  1:01 PM, will monitor as appropriate  # Coagulation Defect: INR = 1.43 (Ref range: 0.85 - 1.15) and/or PTT = 63 Seconds (Ref range: 22 - 38 Seconds), will monitor for bleeding    # Hypertension: Noted on problem list        # Obesity: Estimated body mass index is 32.94 kg/m  as calculated from the following:    Height as of this encounter: 1.778 m (5' 10\").   "  Weight as of this encounter: 104.1 kg (229 lb 9.6 oz)., PRESENT ON ADMISSION      # History of CABG: noted on surgical history      Current Medications:    Scheduled Meds:   acetaminophen  975 mg Oral Q8H    aspirin  162 mg Oral or NG Tube Daily    Or    aspirin  300 mg Rectal Daily    ceFAZolin  2 g Intravenous Q8H    heparin ANTICOAGULANT  5,000 Units Subcutaneous Q8H    lidocaine  1-2 patch Transdermal Q24H    nystatin   Topical BID    pantoprazole  40 mg Oral or NG Tube Daily    Or    pantoprazole  40 mg Oral Daily    polyethylene glycol  17 g Oral Daily    rosuvastatin  40 mg Oral QPM    senna-docusate  1 tablet Oral BID     Continuous Infusions:   EPINEPHrine      insulin regular 0.5 Units/hr (02/15/24 1012)    niCARdipine      phenylephrine 0.3 mcg/kg/min (02/15/24 0827)    BETA BLOCKER NOT PRESCRIBED       PRN Meds:.[START ON 2/17/2024] acetaminophen, albumin human, bisacodyl, calcium gluconate, calcium gluconate, calcium gluconate, glucose **OR** dextrose **OR** glucagon, EPINEPHrine, hydrALAZINE, HYDROmorphone **OR** HYDROmorphone, ipratropium - albuterol 0.5 mg/2.5 mg/3 mL, lactated ringers, magnesium hydroxide, niCARdipine, ondansetron **OR** ondansetron, oxyCODONE **OR** oxyCODONE, phenylephrine, prochlorperazine **OR** prochlorperazine, BETA BLOCKER NOT PRESCRIBED    Cardiographics:    Telemetry monitoring demonstrates NSR with paced rate at 90 per my personal review.    Imaging:  Results for orders placed or performed during the hospital encounter of 02/14/24   XR Chest Port 1 View    Impression    IMPRESSION: Postsurgical changes from interval coronary artery bypass grafting. Endotracheal tube terminates 4.3 cm above the marcel. Right neck central venous catheter with tip in the upper SVC. Median sternotomy wires, ascending mediastinal drain, and   left chest tube also present.    The lungs are underinflated with probable left basilar atelectasis. No significant pleural effusion or  pneumothorax.    Normal cardiomediastinal silhouette.   XR Chest Port 1 View    Impression    IMPRESSION:     Interval extubation. Sternotomy with post-CABG changes. Stable mediastinal and left chest tubes. No pneumothorax. Moderate hypoinflation. Mild elevation right hemidiaphragm. Right basilar atelectasis. Cardiomegaly with mild pulmonary vascular prominence   or congestion. Chest otherwise unremarkable.                       Labs, personally reviewed.  Hemoglobin   Date Value Ref Range Status   02/15/2024 9.4 (L) 13.3 - 17.7 g/dL Final   02/14/2024 11.7 (L) 13.3 - 17.7 g/dL Final   02/14/2024 8.7 (L) 13.3 - 17.7 g/dL Final     Hemoglobin POCT   Date Value Ref Range Status   02/14/2024 10.5 (L) 13.3 - 17.7 g/dL Final   02/14/2024 9.2 (L) 13.3 - 17.7 g/dL Final   02/14/2024 9.3 (L) 13.3 - 17.7 g/dL Final     WBC Count   Date Value Ref Range Status   02/15/2024 14.7 (H) 4.0 - 11.0 10e3/uL Final   02/14/2024 19.9 (H) 4.0 - 11.0 10e3/uL Final   02/14/2024 15.8 (H) 4.0 - 11.0 10e3/uL Final     Platelet Count   Date Value Ref Range Status   02/15/2024 167 150 - 450 10e3/uL Final   02/14/2024 174 150 - 450 10e3/uL Final   02/14/2024 152 150 - 450 10e3/uL Final     Creatinine   Date Value Ref Range Status   02/15/2024 1.29 (H) 0.67 - 1.17 mg/dL Final   02/14/2024 0.95 0.67 - 1.17 mg/dL Final   01/22/2024 0.96 0.67 - 1.17 mg/dL Final     Potassium   Date Value Ref Range Status   02/15/2024 4.3 3.4 - 5.3 mmol/L Final   02/14/2024 4.4 3.4 - 5.3 mmol/L Final   02/14/2024 4.4 3.4 - 5.3 mmol/L Final   02/22/2022 4.8 3.5 - 5.0 mmol/L Final   02/04/2022 5.1 (H) 3.5 - 5.0 mmol/L Final   04/05/2021 4.5 3.5 - 5.0 mmol/L Final     Potassium POCT   Date Value Ref Range Status   02/14/2024 4.1 3.4 - 5.3 mmol/L Final   02/14/2024 3.8 3.4 - 5.3 mmol/L Final   02/14/2024 4.8 3.4 - 5.3 mmol/L Final     Magnesium   Date Value Ref Range Status   02/15/2024 2.6 (H) 1.7 - 2.3 mg/dL Final   02/14/2024 3.1 (H) 1.7 - 2.3 mg/dL Final   02/06/2024  2.3 1.7 - 2.3 mg/dL Final          I/O:  I/O last 3 completed shifts:  In: 4339.23 [P.O.:100; I.V.:3489.23; Other:600]  Out: 2702 [Urine:2165; Chest Tube:537]       Physical Exam:    General: Patient seen up in chair. NAD. Conversant. Pleasant.   HEENT: GERALDO, no sclera icterus, moist mucosa  CV: RRR on monitor. 2+ peripheral pulses in all extremities. Mild edema.   Pulm: Non-labored effort on 4L NC. Chest tubes in place, serosanguinous output, no air leak.  Incision C/D/I.  Abd: Soft, NT, ND  : Salazar with allen urine  Ext: Mild pedal edema, SCDs in place, warm, distal pulses intact  Neuro: CNs grossly intact.       ASSESSMENT/PLAN:    Nick Zaldivar is a 78 year old male with a history of CAD and aortic stenosis who is s/p CAB x 3.    Principal Problem:    S/P AVR (aortic valve replacement)        NEURO:   - Scheduled Tylenol/lidocaine patches and PRN Tylenol/oxycodone/dilaudid for pain    CV:   - Pre-op EF 60-65%  - Normotensive  - Weaning pressors as hemodynamics allow, currently on Chris 0.3  - Beta blocker pending weaning from pressors  - ASA 162mg  - Rosuvastatin 40 mg daily  - Chest tubes to remain today      PULM:   - Extubated POD #0  - Maintaining oxygen saturations on 4L NC  - Encourage pulmonary toilet    FEN/GI:  - Continue electrolyte replacement protocol  - Diet: Cardiac, ADAT   - Bowel regimen    RENAL:  - Adequate UOP/hr. Continue to monitor closely.  - Cr 1.29(baseline 0.96)  - Salazar to remain in for close monitoring of I/O and during period of diuresis/relative immobility.   - Diuresis PRN and pending weaning from pressors    HEME:  - Acute blood loss anemia post-op.   - Hgb 9.4, no bleeding concerns. Hep SQ, ASA    ID:  - Tori op ppx complete, afebrile . No concerns for infection    ENDO:   - Continue Insulin gtt.     PPx:   - DVT: SCDs, SQ heparin TID, ambulation   - GI: Protonix 40mg PO daily    DISPO:   - Continue critical care in ICU until pressors off      Patient discussed with Dr. Culp  and Dr Flanagan.      Lori Gutiérrez PA-C  [facility] Cardiothoracic Surgery  Vocera or pager [phone]

## 2024-02-15 NOTE — PROGRESS NOTES
SPIRITUAL HEALTH SERVICES Note     Saw pt Nick Zaldivar and administered Taoist sacrament of anointing for the healing of the sick.    Fr. Cuba Walters

## 2024-02-16 ENCOUNTER — APPOINTMENT (OUTPATIENT)
Dept: OCCUPATIONAL THERAPY | Facility: HOSPITAL | Age: 79
DRG: 236 | End: 2024-02-16
Attending: THORACIC SURGERY (CARDIOTHORACIC VASCULAR SURGERY)
Payer: MEDICARE

## 2024-02-16 LAB
ANION GAP SERPL CALCULATED.3IONS-SCNC: 6 MMOL/L (ref 7–15)
ATRIAL RATE - MUSE: 131 BPM
BUN SERPL-MCNC: 39.4 MG/DL (ref 8–23)
CA-I BLD-MCNC: 4.7 MG/DL (ref 4.4–5.2)
CA-I BLD-MCNC: 4.8 MG/DL (ref 4.4–5.2)
CALCIUM SERPL-MCNC: 8.5 MG/DL (ref 8.8–10.2)
CHLORIDE SERPL-SCNC: 110 MMOL/L (ref 98–107)
CREAT SERPL-MCNC: 1.41 MG/DL (ref 0.67–1.17)
DEPRECATED HCO3 PLAS-SCNC: 26 MMOL/L (ref 22–29)
DIASTOLIC BLOOD PRESSURE - MUSE: NORMAL MMHG
EGFRCR SERPLBLD CKD-EPI 2021: 51 ML/MIN/1.73M2
ERYTHROCYTE [DISTWIDTH] IN BLOOD BY AUTOMATED COUNT: 13.2 % (ref 10–15)
GLUCOSE BLDC GLUCOMTR-MCNC: 113 MG/DL (ref 70–99)
GLUCOSE BLDC GLUCOMTR-MCNC: 136 MG/DL (ref 70–99)
GLUCOSE BLDC GLUCOMTR-MCNC: 139 MG/DL (ref 70–99)
GLUCOSE BLDC GLUCOMTR-MCNC: 162 MG/DL (ref 70–99)
GLUCOSE SERPL-MCNC: 129 MG/DL (ref 70–99)
HCT VFR BLD AUTO: 27.6 % (ref 40–53)
HGB BLD-MCNC: 8.9 G/DL (ref 13.3–17.7)
INTERPRETATION ECG - MUSE: NORMAL
MAGNESIUM SERPL-MCNC: 2.9 MG/DL (ref 1.7–2.3)
MCH RBC QN AUTO: 30.1 PG (ref 26.5–33)
MCHC RBC AUTO-ENTMCNC: 32.2 G/DL (ref 31.5–36.5)
MCV RBC AUTO: 93 FL (ref 78–100)
P AXIS - MUSE: NORMAL DEGREES
PHOSPHATE SERPL-MCNC: 2.9 MG/DL (ref 2.5–4.5)
PLATELET # BLD AUTO: 134 10E3/UL (ref 150–450)
POTASSIUM SERPL-SCNC: 4.4 MMOL/L (ref 3.4–5.3)
POTASSIUM SERPL-SCNC: 4.4 MMOL/L (ref 3.4–5.3)
PR INTERVAL - MUSE: NORMAL MS
QRS DURATION - MUSE: 90 MS
QT - MUSE: 358 MS
QTC - MUSE: 497 MS
R AXIS - MUSE: -2 DEGREES
RBC # BLD AUTO: 2.96 10E6/UL (ref 4.4–5.9)
SODIUM SERPL-SCNC: 142 MMOL/L (ref 135–145)
SYSTOLIC BLOOD PRESSURE - MUSE: NORMAL MMHG
T AXIS - MUSE: 66 DEGREES
VENTRICULAR RATE- MUSE: 116 BPM
WBC # BLD AUTO: 13.2 10E3/UL (ref 4–11)

## 2024-02-16 PROCEDURE — 94799 UNLISTED PULMONARY SVC/PX: CPT

## 2024-02-16 PROCEDURE — 83735 ASSAY OF MAGNESIUM: CPT | Performed by: THORACIC SURGERY (CARDIOTHORACIC VASCULAR SURGERY)

## 2024-02-16 PROCEDURE — 250N000011 HC RX IP 250 OP 636: Performed by: NURSE PRACTITIONER

## 2024-02-16 PROCEDURE — 200N000001 HC R&B ICU

## 2024-02-16 PROCEDURE — 82330 ASSAY OF CALCIUM: CPT | Performed by: THORACIC SURGERY (CARDIOTHORACIC VASCULAR SURGERY)

## 2024-02-16 PROCEDURE — 84100 ASSAY OF PHOSPHORUS: CPT | Performed by: THORACIC SURGERY (CARDIOTHORACIC VASCULAR SURGERY)

## 2024-02-16 PROCEDURE — 85027 COMPLETE CBC AUTOMATED: CPT | Performed by: PHYSICIAN ASSISTANT

## 2024-02-16 PROCEDURE — 250N000013 HC RX MED GY IP 250 OP 250 PS 637: Performed by: PHYSICIAN ASSISTANT

## 2024-02-16 PROCEDURE — 93010 ELECTROCARDIOGRAM REPORT: CPT | Mod: HIP | Performed by: STUDENT IN AN ORGANIZED HEALTH CARE EDUCATION/TRAINING PROGRAM

## 2024-02-16 PROCEDURE — 82330 ASSAY OF CALCIUM: CPT | Performed by: NURSE PRACTITIONER

## 2024-02-16 PROCEDURE — 97535 SELF CARE MNGMENT TRAINING: CPT | Mod: GO

## 2024-02-16 PROCEDURE — 80048 BASIC METABOLIC PNL TOTAL CA: CPT | Performed by: PHYSICIAN ASSISTANT

## 2024-02-16 PROCEDURE — 250N000013 HC RX MED GY IP 250 OP 250 PS 637: Performed by: NURSE PRACTITIONER

## 2024-02-16 PROCEDURE — 250N000011 HC RX IP 250 OP 636: Performed by: PHYSICIAN ASSISTANT

## 2024-02-16 PROCEDURE — 93005 ELECTROCARDIOGRAM TRACING: CPT

## 2024-02-16 PROCEDURE — 999N000157 HC STATISTIC RCP TIME EA 10 MIN

## 2024-02-16 PROCEDURE — 999N000156 HC STATISTIC RCP CONSULT EA 30 MIN

## 2024-02-16 PROCEDURE — 97110 THERAPEUTIC EXERCISES: CPT | Mod: GO

## 2024-02-16 PROCEDURE — 80048 BASIC METABOLIC PNL TOTAL CA: CPT | Performed by: THORACIC SURGERY (CARDIOTHORACIC VASCULAR SURGERY)

## 2024-02-16 PROCEDURE — 250N000011 HC RX IP 250 OP 636: Performed by: THORACIC SURGERY (CARDIOTHORACIC VASCULAR SURGERY)

## 2024-02-16 PROCEDURE — 250N000012 HC RX MED GY IP 250 OP 636 PS 637: Performed by: PHYSICIAN ASSISTANT

## 2024-02-16 PROCEDURE — 258N000003 HC RX IP 258 OP 636: Performed by: THORACIC SURGERY (CARDIOTHORACIC VASCULAR SURGERY)

## 2024-02-16 RX ORDER — AMIODARONE HYDROCHLORIDE 200 MG/1
200 TABLET ORAL DAILY
Status: DISCONTINUED | OUTPATIENT
Start: 2024-02-23 | End: 2024-02-16

## 2024-02-16 RX ORDER — FUROSEMIDE 10 MG/ML
40 INJECTION INTRAMUSCULAR; INTRAVENOUS
Status: DISCONTINUED | OUTPATIENT
Start: 2024-02-16 | End: 2024-02-16

## 2024-02-16 RX ORDER — METOPROLOL TARTRATE 25 MG/1
25 TABLET, FILM COATED ORAL 2 TIMES DAILY
Status: DISCONTINUED | OUTPATIENT
Start: 2024-02-16 | End: 2024-02-20

## 2024-02-16 RX ORDER — AMIODARONE HYDROCHLORIDE 200 MG/1
400 TABLET ORAL 2 TIMES DAILY
Qty: 16 TABLET | Refills: 0 | Status: DISCONTINUED | OUTPATIENT
Start: 2024-02-16 | End: 2024-02-16

## 2024-02-16 RX ORDER — AMIODARONE HYDROCHLORIDE 200 MG/1
400 TABLET ORAL 2 TIMES DAILY
Qty: 16 TABLET | Refills: 0 | Status: COMPLETED | OUTPATIENT
Start: 2024-02-17 | End: 2024-02-20

## 2024-02-16 RX ORDER — AMIODARONE HYDROCHLORIDE 200 MG/1
200 TABLET ORAL DAILY
Qty: 21 TABLET | Refills: 0 | Status: DISCONTINUED | OUTPATIENT
Start: 2024-02-23 | End: 2024-02-22 | Stop reason: HOSPADM

## 2024-02-16 RX ORDER — FUROSEMIDE 10 MG/ML
20 INJECTION INTRAMUSCULAR; INTRAVENOUS
Status: DISCONTINUED | OUTPATIENT
Start: 2024-02-16 | End: 2024-02-16

## 2024-02-16 RX ORDER — FUROSEMIDE 10 MG/ML
20 INJECTION INTRAMUSCULAR; INTRAVENOUS
Status: DISCONTINUED | OUTPATIENT
Start: 2024-02-16 | End: 2024-02-17

## 2024-02-16 RX ORDER — AMIODARONE HYDROCHLORIDE 200 MG/1
400 TABLET ORAL 2 TIMES DAILY
Status: DISCONTINUED | OUTPATIENT
Start: 2024-02-17 | End: 2024-02-16

## 2024-02-16 RX ORDER — AMIODARONE HYDROCHLORIDE 200 MG/1
200 TABLET ORAL 2 TIMES DAILY
Status: DISCONTINUED | OUTPATIENT
Start: 2024-02-20 | End: 2024-02-16

## 2024-02-16 RX ORDER — AMIODARONE HYDROCHLORIDE 200 MG/1
200 TABLET ORAL 2 TIMES DAILY
Qty: 4 TABLET | Refills: 0 | Status: DISCONTINUED | OUTPATIENT
Start: 2024-02-20 | End: 2024-02-16

## 2024-02-16 RX ORDER — AMIODARONE HYDROCHLORIDE 200 MG/1
200 TABLET ORAL 2 TIMES DAILY
Qty: 4 TABLET | Refills: 0 | Status: DISCONTINUED | OUTPATIENT
Start: 2024-02-21 | End: 2024-02-22 | Stop reason: HOSPADM

## 2024-02-16 RX ORDER — AMIODARONE HYDROCHLORIDE 200 MG/1
200 TABLET ORAL DAILY
Qty: 21 TABLET | Refills: 0 | Status: DISCONTINUED | OUTPATIENT
Start: 2024-02-22 | End: 2024-02-16

## 2024-02-16 RX ADMIN — NYSTATIN: 100000 CREAM TOPICAL at 11:38

## 2024-02-16 RX ADMIN — AMIODARONE HYDROCHLORIDE 0.5 MG/MIN: 1.8 INJECTION, SOLUTION INTRAVENOUS at 11:35

## 2024-02-16 RX ADMIN — SENNOSIDES AND DOCUSATE SODIUM 1 TABLET: 8.6; 5 TABLET ORAL at 09:16

## 2024-02-16 RX ADMIN — ACETAMINOPHEN 975 MG: 325 TABLET ORAL at 05:09

## 2024-02-16 RX ADMIN — ACETAMINOPHEN 975 MG: 325 TABLET ORAL at 20:56

## 2024-02-16 RX ADMIN — SENNOSIDES AND DOCUSATE SODIUM 1 TABLET: 8.6; 5 TABLET ORAL at 20:56

## 2024-02-16 RX ADMIN — HEPARIN SODIUM 5000 UNITS: 5000 INJECTION, SOLUTION INTRAVENOUS; SUBCUTANEOUS at 21:02

## 2024-02-16 RX ADMIN — FUROSEMIDE 40 MG: 10 INJECTION, SOLUTION INTRAMUSCULAR; INTRAVENOUS at 09:15

## 2024-02-16 RX ADMIN — FUROSEMIDE 20 MG: 10 INJECTION, SOLUTION INTRAMUSCULAR; INTRAVENOUS at 18:06

## 2024-02-16 RX ADMIN — PANTOPRAZOLE SODIUM 40 MG: 40 TABLET, DELAYED RELEASE ORAL at 09:16

## 2024-02-16 RX ADMIN — AMIODARONE HYDROCHLORIDE 0.5 MG/MIN: 1.8 INJECTION, SOLUTION INTRAVENOUS at 21:06

## 2024-02-16 RX ADMIN — OXYCODONE HYDROCHLORIDE 5 MG: 5 TABLET ORAL at 05:10

## 2024-02-16 RX ADMIN — HEPARIN SODIUM 5000 UNITS: 5000 INJECTION, SOLUTION INTRAVENOUS; SUBCUTANEOUS at 14:16

## 2024-02-16 RX ADMIN — LIDOCAINE 1 PATCH: 4 PATCH TOPICAL at 14:17

## 2024-02-16 RX ADMIN — AMIODARONE HYDROCHLORIDE 1 MG/MIN: 1.8 INJECTION, SOLUTION INTRAVENOUS at 05:28

## 2024-02-16 RX ADMIN — INSULIN ASPART 1 UNITS: 100 INJECTION, SOLUTION INTRAVENOUS; SUBCUTANEOUS at 13:02

## 2024-02-16 RX ADMIN — HEPARIN SODIUM 5000 UNITS: 5000 INJECTION, SOLUTION INTRAVENOUS; SUBCUTANEOUS at 05:32

## 2024-02-16 RX ADMIN — ACETAMINOPHEN 975 MG: 325 TABLET ORAL at 14:16

## 2024-02-16 RX ADMIN — NYSTATIN: 100000 CREAM TOPICAL at 20:57

## 2024-02-16 RX ADMIN — AMIODARONE HYDROCHLORIDE 75 MG: 50 INJECTION, SOLUTION INTRAVENOUS at 02:45

## 2024-02-16 RX ADMIN — POLYETHYLENE GLYCOL 3350 17 G: 17 POWDER, FOR SOLUTION ORAL at 09:16

## 2024-02-16 RX ADMIN — METOPROLOL TARTRATE 25 MG: 25 TABLET, FILM COATED ORAL at 20:56

## 2024-02-16 RX ADMIN — ASPIRIN 81 MG CHEWABLE TABLET 162 MG: 81 TABLET CHEWABLE at 09:16

## 2024-02-16 RX ADMIN — ROSUVASTATIN CALCIUM 40 MG: 40 TABLET, COATED ORAL at 20:56

## 2024-02-16 ASSESSMENT — ACTIVITIES OF DAILY LIVING (ADL)
ADLS_ACUITY_SCORE: 33
ADLS_ACUITY_SCORE: 34
ADLS_ACUITY_SCORE: 33
ADLS_ACUITY_SCORE: 34
ADLS_ACUITY_SCORE: 35
ADLS_ACUITY_SCORE: 33
ADLS_ACUITY_SCORE: 34
ADLS_ACUITY_SCORE: 33

## 2024-02-16 NOTE — PROGRESS NOTES
"Care Management Follow Up    Length of Stay (days): 2    Expected Discharge Date: 02/18/2024     Concerns to be Addressed:    discharge planning    Patient plan of care discussed at interdisciplinary rounds: Yes    Anticipated Discharge Disposition:       Anticipated Discharge Services:    Education Provided on the Discharge Plan:  Per Care Team   Patient/Family in Agreement with the Plan:  Yes    Referrals Placed by CM/SW:    Private pay costs discussed: Not applicable    Additional Information:  Chart reviewed.    Cm updates:  Post OP day #2 CABGx3    Therapy recs home with OP CR,which patient is agreeable and would prefer [place] location if possible.       Social Hx:  \"CABG 2/14. RNCM introduced self and CM role to pt. Pt lives with spouse Coretta, son Remy, and Daughter Patricia in a house. Pt Ind with ADLS and IADLS. No home care svcs. No mobility aides. Retired. Drives.Family to transport.\"           Cm will continue to follow plan of care, review recommendations,and assist with any discharge needs anticipated.        Eva Smith RN      "

## 2024-02-16 NOTE — CONSULTS
NUTRITION EDUCATION      REASON :  Provider order for diet education after cardiac surgery    NUTRITION HISTORY:  Patient is s/p CABG.  Patient reports that he will be back for valve surgery in the future.    Met patient, patient's spouse and daughter.  Patient and family have purchased Mrs Dash seasoning and have been looking at food labels for lower sodium foods.    CURRENT DIET:  Low saturated fat <2400 mg sodium    NUTRITION DIAGNOSIS:  Food- and nutrition-related knowledge deficit R/t heart disease    INTERVENTIONS:    Nutrition Prescription:  Low sodium diet    Implementation:      *  Nutrition Education (Content):   A)  Provided handout Low sodium Nutrition Therapy, Label reading tips, Eat Right with my plate.   B)  Discussed heart healthy low sodium diet.  Encouraged increased intake of vegetables at meals, fresh or cooked vegetables.      *  Nutrition Education (Application):   A)  Discussed current eating habits and recommended alternative food choices      *  Anticipate compliance      *  Diet Education - refer to Education Flowsheet   Start Ensure Enlive daily, pt reports not eating much since surgery.     Goals:      *  Patient and patient's family participated in diet education.       *  All of the above goals met during the education session    Follow Up/Monitoring:      *  Provided RD contact information for future questions      *  Further diet ed in cardiac rehab outpatient program.

## 2024-02-16 NOTE — PLAN OF CARE
[facility] - ICU    RN Progress Note:            Pertinent Assessments:      Please refer to flowsheet rows for full assessment     Patient is currently going between fib and sinus with AV pacing when in SR due to lower pressures. All VSS on 4L NC.           Key Events - This Shift:       Patient went into afib early this morning and was given 75mg bolus of amiodarone which he did convert to back to SR in the 70's. Was going in and out of fib and amio gtt was started around 5                   Barriers to Discharge / Downgrade:     Pacer         Problem: Cardiovascular Surgery  Goal: Effective Cardiac Function  Outcome: Progressing     Problem: Cardiovascular Surgery  Goal: Fluid and Electrolyte Balance  Outcome: Progressing   Goal Outcome Evaluation:

## 2024-02-16 NOTE — PROGRESS NOTES
"CVTS Daily Progress Note   POD#2 s/p CAB x 3(Separate reversed saphenous vein grafts to right posterior descending, the obtuse marginal 1 and the left anterior descending diagonal branch coronary arteries)  Attending: Dr. Flanagan  LOS: 2    SUBJECTIVE/INTERVAL EVENTS:    A-Fib w/RVR last night. Amio bolus and infusion given. Now NSR alternating with A-fib. He  Patient progressing well. Maintaining oxygen saturations on 3L NC. Normotensive. Up to chair this am. Pain well controlled. -BM / flatus. Tolerating diet. UOP adequate. Chest tube output appropriate. Hgb stable. Patient denies new chest pain, shortness of breath, abdominal pain, calf pain, nausea. Patient has no questions today.     OBJECTIVE:  Temp:  [98.8  F (37.1  C)-99.1  F (37.3  C)] 99.1  F (37.3  C)  Pulse:  [] 119  BP: ()/(51-68) 114/54  MAP:  [63 mmHg-115 mmHg] 71 mmHg  Arterial Line BP: ()/() 78/67  SpO2:  [91 %-97 %] 96 %  Vitals:    02/14/24 0524 02/15/24 0525   Weight: 104.8 kg (231 lb) 104.1 kg (229 lb 9.6 oz)       Clinically Significant Risk Factors         # Hypernatremia: Highest Na = 147 mmol/L in last 2 days, will monitor as appropriate  # Hypocalcemia: Lowest iCa = 4.1 mg/dL in last 2 days, will monitor and replace as appropriate     # Hypoalbuminemia: Lowest albumin = 3 g/dL at 2/14/2024  1:01 PM, will monitor as appropriate    # Coagulation Defect: INR = 1.43 (Ref range: 0.85 - 1.15) and/or PTT = 63 Seconds (Ref range: 22 - 38 Seconds), will monitor for bleeding    # Hypertension: Noted on problem list        # Obesity: Estimated body mass index is 32.94 kg/m  as calculated from the following:    Height as of this encounter: 1.778 m (5' 10\").    Weight as of this encounter: 104.1 kg (229 lb 9.6 oz)., PRESENT ON ADMISSION      # History of CABG: noted on surgical history      Current Medications:    Scheduled Meds:   acetaminophen  975 mg Oral Q8H    aspirin  162 mg Oral or NG Tube Daily    Or    aspirin  300 mg " Rectal Daily    furosemide  40 mg Intravenous BID    heparin ANTICOAGULANT  5,000 Units Subcutaneous Q8H    insulin aspart  1-7 Units Subcutaneous TID AC    insulin aspart  1-5 Units Subcutaneous At Bedtime    lidocaine  1-2 patch Transdermal Q24H    metoprolol tartrate  12.5 mg Oral BID    nystatin   Topical BID    pantoprazole  40 mg Oral or NG Tube Daily    Or    pantoprazole  40 mg Oral Daily    polyethylene glycol  17 g Oral Daily    rosuvastatin  40 mg Oral QPM    senna-docusate  1 tablet Oral BID     Continuous Infusions:   amiodarone 1 mg/min (02/16/24 0528)    amiodarone      EPINEPHrine      niCARdipine      phenylephrine Stopped (02/15/24 1230)    BETA BLOCKER NOT PRESCRIBED       PRN Meds:.[START ON 2/17/2024] acetaminophen, bisacodyl, calcium gluconate, calcium gluconate, calcium gluconate, glucose **OR** dextrose **OR** glucagon, EPINEPHrine, hydrALAZINE, HYDROmorphone **OR** HYDROmorphone, ipratropium - albuterol 0.5 mg/2.5 mg/3 mL, lactated ringers, magnesium hydroxide, naloxone **OR** naloxone **OR** naloxone **OR** naloxone, niCARdipine, ondansetron **OR** ondansetron, oxyCODONE **OR** oxyCODONE, phenylephrine, prochlorperazine **OR** prochlorperazine, BETA BLOCKER NOT PRESCRIBED    Cardiographics:    Telemetry monitoring demonstrates a-fib with rate at  per my personal review.    Imaging:  Results for orders placed or performed during the hospital encounter of 02/14/24   XR Chest Port 1 View    Impression    IMPRESSION: Postsurgical changes from interval coronary artery bypass grafting. Endotracheal tube terminates 4.3 cm above the marcel. Right neck central venous catheter with tip in the upper SVC. Median sternotomy wires, ascending mediastinal drain, and   left chest tube also present.    The lungs are underinflated with probable left basilar atelectasis. No significant pleural effusion or pneumothorax.    Normal cardiomediastinal silhouette.   XR Chest Port 1 View    Impression     IMPRESSION:     Interval extubation. Sternotomy with post-CABG changes. Stable mediastinal and left chest tubes. No pneumothorax. Moderate hypoinflation. Mild elevation right hemidiaphragm. Right basilar atelectasis. Cardiomegaly with mild pulmonary vascular prominence   or congestion. Chest otherwise unremarkable.                       Labs, personally reviewed.  Hemoglobin   Date Value Ref Range Status   02/15/2024 9.4 (L) 13.3 - 17.7 g/dL Final   02/14/2024 11.7 (L) 13.3 - 17.7 g/dL Final   02/14/2024 8.7 (L) 13.3 - 17.7 g/dL Final     Hemoglobin POCT   Date Value Ref Range Status   02/14/2024 10.5 (L) 13.3 - 17.7 g/dL Final   02/14/2024 9.2 (L) 13.3 - 17.7 g/dL Final   02/14/2024 9.3 (L) 13.3 - 17.7 g/dL Final     WBC Count   Date Value Ref Range Status   02/15/2024 14.7 (H) 4.0 - 11.0 10e3/uL Final   02/14/2024 19.9 (H) 4.0 - 11.0 10e3/uL Final   02/14/2024 15.8 (H) 4.0 - 11.0 10e3/uL Final     Platelet Count   Date Value Ref Range Status   02/15/2024 167 150 - 450 10e3/uL Final   02/14/2024 174 150 - 450 10e3/uL Final   02/14/2024 152 150 - 450 10e3/uL Final     Creatinine   Date Value Ref Range Status   02/15/2024 1.29 (H) 0.67 - 1.17 mg/dL Final   02/14/2024 0.95 0.67 - 1.17 mg/dL Final   01/22/2024 0.96 0.67 - 1.17 mg/dL Final     Potassium   Date Value Ref Range Status   02/16/2024 4.4 3.4 - 5.3 mmol/L Final   02/15/2024 4.3 3.4 - 5.3 mmol/L Final   02/14/2024 4.4 3.4 - 5.3 mmol/L Final   02/14/2024 4.4 3.4 - 5.3 mmol/L Final   02/22/2022 4.8 3.5 - 5.0 mmol/L Final   02/04/2022 5.1 (H) 3.5 - 5.0 mmol/L Final   04/05/2021 4.5 3.5 - 5.0 mmol/L Final     Potassium POCT   Date Value Ref Range Status   02/14/2024 4.1 3.4 - 5.3 mmol/L Final   02/14/2024 3.8 3.4 - 5.3 mmol/L Final   02/14/2024 4.8 3.4 - 5.3 mmol/L Final     Magnesium   Date Value Ref Range Status   02/16/2024 2.9 (H) 1.7 - 2.3 mg/dL Final   02/15/2024 2.6 (H) 1.7 - 2.3 mg/dL Final   02/14/2024 3.1 (H) 1.7 - 2.3 mg/dL Final          I/O:  I/O last  3 completed shifts:  In: 1798.23 [P.O.:800; I.V.:698.23; IV Piggyback:250]  Out: 985 [Urine:735; Chest Tube:250]       Physical Exam:    General: Patient seen up in chair. NAD. Conversant. Pleasant.   HEENT: GERALDO, no sclera icterus, moist mucosa  CV: RRR on monitor. 2+ peripheral pulses in all extremities. Mild edema.   Pulm: Non-labored effort on 3L NC. Chest tubes in place, serosanguinous output, no air leak.  Incision C/D/I.  Abd: Soft, NT, ND  : Salazar with allen urine  Ext: Mild pedal edema, SCDs in place, warm, distal pulses intact  Neuro: CNs grossly intact.       ASSESSMENT/PLAN:    Nick Zaldivar is a 78 year old male with a history of CAD and aortic stenosis who is s/p CAB x 3.    Principal Problem:    S/P AVR (aortic valve replacement)        NEURO:   - Scheduled Tylenol/lidocaine patches and PRN Tylenol/oxycodone/dilaudid for pain    CV:   - Pre-op EF 60-65%  - Normotensive  - Pressors off 2/15  - Metoprolol 12.5 mg PO BID with parameters  - Amiodarone gtt-Will switch to PO taper dosing today  - ASA 162mg  - Rosuvastatin 40 mg daily  - Chest tubes and TPW's removed without complications      PULM:   - Extubated POD #0  - Maintaining oxygen saturations on 3L NC  - Encourage pulmonary toilet    FEN/GI:  - Continue electrolyte replacement protocol  - Diet: Cardiac, ADAT   - Bowel regimen    RENAL:  - Adequate UOP/hr. Continue to monitor closely.  - Cr 1.29(baseline 0.96)  - Salazar to be removed POD#2  - Diuresis with Lasix 40 mg IV BID    HEME:  - Acute blood loss anemia post-op.   - Hgb 9.4, no bleeding concerns. Hep SQ, ASA    ID:  - Tori op ppx complete, afebrile . No concerns for infection    ENDO:   - Transition to SSI     PPx:   - DVT: SCDs, SQ heparin TID, ambulation   - GI: Protonix 40mg PO daily    DISPO:   - Transfer to general telemetry unit POD#2      Patient discussed with Dr. Culp and Dr Flanagan.      Lori Gutiérrez PA-C  [facility] Cardiothoracic Surgery  Vocera or pager [phone]

## 2024-02-17 ENCOUNTER — APPOINTMENT (OUTPATIENT)
Dept: OCCUPATIONAL THERAPY | Facility: HOSPITAL | Age: 79
DRG: 236 | End: 2024-02-17
Attending: THORACIC SURGERY (CARDIOTHORACIC VASCULAR SURGERY)
Payer: MEDICARE

## 2024-02-17 LAB
CA-I BLD-MCNC: 4.5 MG/DL (ref 4.4–5.2)
GLUCOSE BLDC GLUCOMTR-MCNC: 120 MG/DL (ref 70–99)
GLUCOSE BLDC GLUCOMTR-MCNC: 135 MG/DL (ref 70–99)
GLUCOSE BLDC GLUCOMTR-MCNC: 153 MG/DL (ref 70–99)
GLUCOSE BLDC GLUCOMTR-MCNC: 160 MG/DL (ref 70–99)
HOLD SPECIMEN: NORMAL
MAGNESIUM SERPL-MCNC: 2.7 MG/DL (ref 1.7–2.3)
PHOSPHATE SERPL-MCNC: 2.1 MG/DL (ref 2.5–4.5)
POTASSIUM SERPL-SCNC: 3.9 MMOL/L (ref 3.4–5.3)

## 2024-02-17 PROCEDURE — 250N000013 HC RX MED GY IP 250 OP 250 PS 637: Performed by: PHYSICIAN ASSISTANT

## 2024-02-17 PROCEDURE — 84100 ASSAY OF PHOSPHORUS: CPT | Performed by: PHYSICIAN ASSISTANT

## 2024-02-17 PROCEDURE — 250N000011 HC RX IP 250 OP 636: Performed by: PHYSICIAN ASSISTANT

## 2024-02-17 PROCEDURE — 84132 ASSAY OF SERUM POTASSIUM: CPT | Performed by: PHYSICIAN ASSISTANT

## 2024-02-17 PROCEDURE — 36415 COLL VENOUS BLD VENIPUNCTURE: CPT | Performed by: PHYSICIAN ASSISTANT

## 2024-02-17 PROCEDURE — 83735 ASSAY OF MAGNESIUM: CPT | Performed by: PHYSICIAN ASSISTANT

## 2024-02-17 PROCEDURE — 97535 SELF CARE MNGMENT TRAINING: CPT | Mod: GO

## 2024-02-17 PROCEDURE — 97110 THERAPEUTIC EXERCISES: CPT | Mod: GO

## 2024-02-17 PROCEDURE — 82330 ASSAY OF CALCIUM: CPT | Performed by: PHYSICIAN ASSISTANT

## 2024-02-17 PROCEDURE — 200N000001 HC R&B ICU

## 2024-02-17 PROCEDURE — 250N000013 HC RX MED GY IP 250 OP 250 PS 637: Performed by: THORACIC SURGERY (CARDIOTHORACIC VASCULAR SURGERY)

## 2024-02-17 RX ORDER — FUROSEMIDE 10 MG/ML
40 INJECTION INTRAMUSCULAR; INTRAVENOUS
Status: DISCONTINUED | OUTPATIENT
Start: 2024-02-17 | End: 2024-02-21

## 2024-02-17 RX ADMIN — AMIODARONE HYDROCHLORIDE 400 MG: 200 TABLET ORAL at 21:16

## 2024-02-17 RX ADMIN — LIDOCAINE 1 PATCH: 4 PATCH TOPICAL at 14:15

## 2024-02-17 RX ADMIN — HEPARIN SODIUM 5000 UNITS: 5000 INJECTION, SOLUTION INTRAVENOUS; SUBCUTANEOUS at 06:10

## 2024-02-17 RX ADMIN — POLYETHYLENE GLYCOL 3350 17 G: 17 POWDER, FOR SOLUTION ORAL at 09:23

## 2024-02-17 RX ADMIN — INSULIN ASPART 1 UNITS: 100 INJECTION, SOLUTION INTRAVENOUS; SUBCUTANEOUS at 16:00

## 2024-02-17 RX ADMIN — FUROSEMIDE 40 MG: 10 INJECTION, SOLUTION INTRAMUSCULAR; INTRAVENOUS at 09:23

## 2024-02-17 RX ADMIN — SENNOSIDES AND DOCUSATE SODIUM 1 TABLET: 8.6; 5 TABLET ORAL at 09:22

## 2024-02-17 RX ADMIN — ASPIRIN 81 MG CHEWABLE TABLET 162 MG: 81 TABLET CHEWABLE at 09:23

## 2024-02-17 RX ADMIN — HEPARIN SODIUM 5000 UNITS: 5000 INJECTION, SOLUTION INTRAVENOUS; SUBCUTANEOUS at 14:16

## 2024-02-17 RX ADMIN — ROSUVASTATIN CALCIUM 40 MG: 40 TABLET, COATED ORAL at 21:15

## 2024-02-17 RX ADMIN — INSULIN ASPART 1 UNITS: 100 INJECTION, SOLUTION INTRAVENOUS; SUBCUTANEOUS at 09:24

## 2024-02-17 RX ADMIN — METOPROLOL TARTRATE 25 MG: 25 TABLET, FILM COATED ORAL at 21:15

## 2024-02-17 RX ADMIN — METOPROLOL TARTRATE 25 MG: 25 TABLET, FILM COATED ORAL at 09:22

## 2024-02-17 RX ADMIN — AMIODARONE HYDROCHLORIDE 400 MG: 200 TABLET ORAL at 09:23

## 2024-02-17 RX ADMIN — NYSTATIN: 100000 CREAM TOPICAL at 09:22

## 2024-02-17 RX ADMIN — POTASSIUM & SODIUM PHOSPHATES POWDER PACK 280-160-250 MG 1 PACKET: 280-160-250 PACK at 14:15

## 2024-02-17 RX ADMIN — POTASSIUM & SODIUM PHOSPHATES POWDER PACK 280-160-250 MG 1 PACKET: 280-160-250 PACK at 06:12

## 2024-02-17 RX ADMIN — NYSTATIN: 100000 CREAM TOPICAL at 21:22

## 2024-02-17 RX ADMIN — FUROSEMIDE 40 MG: 10 INJECTION, SOLUTION INTRAMUSCULAR; INTRAVENOUS at 18:43

## 2024-02-17 RX ADMIN — POTASSIUM & SODIUM PHOSPHATES POWDER PACK 280-160-250 MG 1 PACKET: 280-160-250 PACK at 09:29

## 2024-02-17 RX ADMIN — ACETAMINOPHEN 975 MG: 325 TABLET ORAL at 04:54

## 2024-02-17 RX ADMIN — HEPARIN SODIUM 5000 UNITS: 5000 INJECTION, SOLUTION INTRAVENOUS; SUBCUTANEOUS at 21:21

## 2024-02-17 RX ADMIN — PANTOPRAZOLE SODIUM 40 MG: 40 TABLET, DELAYED RELEASE ORAL at 09:23

## 2024-02-17 ASSESSMENT — ACTIVITIES OF DAILY LIVING (ADL)
ADLS_ACUITY_SCORE: 33

## 2024-02-17 NOTE — PROGRESS NOTES
"CVTS Daily Progress Note   POD#3 s/p CAB x 3(Separate reversed saphenous vein grafts to right posterior descending, the obtuse marginal 1 and the left anterior descending diagonal branch coronary arteries)  Attending: Dr. Flanagan  LOS: 3    SUBJECTIVE/INTERVAL EVENTS:    No acute events overnight. NSR. Patient progressing well. Maintaining oxygen saturations on 3L NC. Normotensive. Up to chair this am. Pain well controlled. -BM / +flatus. Tolerating diet. UOP adequate. Hgb stable. Patient denies new chest pain, shortness of breath, abdominal pain, calf pain, nausea. Patient has no questions today.     OBJECTIVE:  Temp:  [98.2  F (36.8  C)-99  F (37.2  C)] 98.2  F (36.8  C)  Pulse:  [] 73  Resp:  [14-20] 20  BP: ()/(49-66) 113/58  SpO2:  [89 %-97 %] 95 %  Vitals:    02/14/24 0524 02/15/24 0525 02/17/24 0655   Weight: 104.8 kg (231 lb) 104.1 kg (229 lb 9.6 oz) 109.1 kg (240 lb 9.6 oz)       Clinically Significant Risk Factors              # Hypoalbuminemia: Lowest albumin = 3 g/dL at 2/14/2024  1:01 PM, will monitor as appropriate       # Hypertension: Noted on problem list        # Obesity: Estimated body mass index is 34.52 kg/m  as calculated from the following:    Height as of this encounter: 1.778 m (5' 10\").    Weight as of this encounter: 109.1 kg (240 lb 9.6 oz)., PRESENT ON ADMISSION      # History of CABG: noted on surgical history      Current Medications:    Scheduled Meds:   amiodarone  400 mg Oral BID    Followed by    [START ON 2/21/2024] amiodarone  200 mg Oral BID    Followed by    [START ON 2/23/2024] amiodarone  200 mg Oral Daily    aspirin  162 mg Oral or NG Tube Daily    furosemide  40 mg Intravenous BID    heparin ANTICOAGULANT  5,000 Units Subcutaneous Q8H    insulin aspart  1-7 Units Subcutaneous TID AC    insulin aspart  1-5 Units Subcutaneous At Bedtime    lidocaine  1-2 patch Transdermal Q24H    metoprolol tartrate  25 mg Oral BID    nystatin   Topical BID    pantoprazole  40 mg " Oral Daily    polyethylene glycol  17 g Oral Daily    potassium & sodium phosphates  1 packet Oral or Feeding Tube Q4H    rosuvastatin  40 mg Oral QPM    senna-docusate  1 tablet Oral BID     Continuous Infusions:   amiodarone Stopped (02/17/24 0851)    BETA BLOCKER NOT PRESCRIBED       PRN Meds:.acetaminophen, bisacodyl, calcium gluconate, calcium gluconate, calcium gluconate, glucose **OR** dextrose **OR** glucagon, hydrALAZINE, ipratropium - albuterol 0.5 mg/2.5 mg/3 mL, lactated ringers, magnesium hydroxide, naloxone **OR** naloxone **OR** naloxone **OR** naloxone, ondansetron **OR** ondansetron, oxyCODONE **OR** oxyCODONE, prochlorperazine **OR** prochlorperazine, BETA BLOCKER NOT PRESCRIBED    Cardiographics:    Telemetry monitoring demonstrates NSR with rate at 70's per my personal review.    Imaging:  Results for orders placed or performed during the hospital encounter of 02/14/24   XR Chest Port 1 View    Impression    IMPRESSION: Postsurgical changes from interval coronary artery bypass grafting. Endotracheal tube terminates 4.3 cm above the marcel. Right neck central venous catheter with tip in the upper SVC. Median sternotomy wires, ascending mediastinal drain, and   left chest tube also present.    The lungs are underinflated with probable left basilar atelectasis. No significant pleural effusion or pneumothorax.    Normal cardiomediastinal silhouette.   XR Chest Port 1 View    Impression    IMPRESSION:     Interval extubation. Sternotomy with post-CABG changes. Stable mediastinal and left chest tubes. No pneumothorax. Moderate hypoinflation. Mild elevation right hemidiaphragm. Right basilar atelectasis. Cardiomegaly with mild pulmonary vascular prominence   or congestion. Chest otherwise unremarkable.                       Labs, personally reviewed.  Hemoglobin   Date Value Ref Range Status   02/16/2024 8.9 (L) 13.3 - 17.7 g/dL Final   02/15/2024 9.4 (L) 13.3 - 17.7 g/dL Final   02/14/2024 11.7 (L)  13.3 - 17.7 g/dL Final     Hemoglobin POCT   Date Value Ref Range Status   02/14/2024 10.5 (L) 13.3 - 17.7 g/dL Final   02/14/2024 9.2 (L) 13.3 - 17.7 g/dL Final   02/14/2024 9.3 (L) 13.3 - 17.7 g/dL Final     WBC Count   Date Value Ref Range Status   02/16/2024 13.2 (H) 4.0 - 11.0 10e3/uL Final   02/15/2024 14.7 (H) 4.0 - 11.0 10e3/uL Final   02/14/2024 19.9 (H) 4.0 - 11.0 10e3/uL Final     Platelet Count   Date Value Ref Range Status   02/16/2024 134 (L) 150 - 450 10e3/uL Final   02/15/2024 167 150 - 450 10e3/uL Final   02/14/2024 174 150 - 450 10e3/uL Final     Creatinine   Date Value Ref Range Status   02/16/2024 1.41 (H) 0.67 - 1.17 mg/dL Final   02/15/2024 1.29 (H) 0.67 - 1.17 mg/dL Final   02/14/2024 0.95 0.67 - 1.17 mg/dL Final     Potassium   Date Value Ref Range Status   02/17/2024 3.9 3.4 - 5.3 mmol/L Final   02/16/2024 4.4 3.4 - 5.3 mmol/L Final   02/16/2024 4.4 3.4 - 5.3 mmol/L Final   02/22/2022 4.8 3.5 - 5.0 mmol/L Final   02/04/2022 5.1 (H) 3.5 - 5.0 mmol/L Final   04/05/2021 4.5 3.5 - 5.0 mmol/L Final     Potassium POCT   Date Value Ref Range Status   02/14/2024 4.1 3.4 - 5.3 mmol/L Final   02/14/2024 3.8 3.4 - 5.3 mmol/L Final   02/14/2024 4.8 3.4 - 5.3 mmol/L Final     Magnesium   Date Value Ref Range Status   02/17/2024 2.7 (H) 1.7 - 2.3 mg/dL Final   02/16/2024 2.9 (H) 1.7 - 2.3 mg/dL Final   02/15/2024 2.6 (H) 1.7 - 2.3 mg/dL Final          I/O:  I/O last 3 completed shifts:  In: 1681.49 [P.O.:1060; I.V.:621.49]  Out: 1160 [Urine:685; Chest Tube:475]       Physical Exam:    General: Patient seen up in chair. NAD. Conversant. Pleasant.   HEENT: GERALDO, no sclera icterus, moist mucosa  CV: RRR on monitor. 2+ peripheral pulses in all extremities. Mild edema.   Pulm: Non-labored effort on 3L NC. Incision C/D/I.  Abd: Soft, NT, ND  : Voiding  Ext: Mild pedal edema, SCDs in place, warm, distal pulses intact  Neuro: CNs grossly intact.       ASSESSMENT/PLAN:    Nick Zaldivar is a 78 year old male  with a history of CAD and aortic stenosis who is s/p CAB x 3.    Principal Problem:    S/P AVR (aortic valve replacement)        NEURO:   - Scheduled Tylenol/lidocaine patches and PRN Tylenol/oxycodone for pain    CV:   - Pre-op EF 60-65%  - Normotensive  - Pressors off 2/15  - Metoprolol 12.5 mg PO BID with parameters-increased to 25 mg PO BID  - Amiodarone PO taper dosing today  - ASA 162mg  - Rosuvastatin 40 mg daily  - Chest tubes and TPW's removed without complications      PULM:   - Extubated POD #0  - Maintaining oxygen saturations on 3L NC  - Encourage pulmonary toilet    FEN/GI:  - Continue electrolyte replacement protocol  - Diet: Cardiac, ADAT   - Bowel regimen    RENAL:  - Adequate UOP/hr. Continue to monitor closely.  - Cr 1.41(1.29)(baseline 0.96)  - Salazar removed POD#2  - Diuresis with Lasix 40 mg IV BID    HEME:  - Acute blood loss anemia post-op.   - Hgb 9.4, no bleeding concerns. Hep SQ, ASA    ID:  - Tori op ppx complete, afebrile . No concerns for infection    ENDO:   - Transition to SSI     PPx:   - DVT: SCDs, SQ heparin TID, ambulation   - GI: Protonix 40mg PO daily    DISPO:   - Transfer to general telemetry unit POD#2      Patient discussed with Dr.Mulvihill Gina McCrone,PAFrankieC  [facility] Cardiothoracic Surgery  Vocera or pager [phone]

## 2024-02-17 NOTE — PROGRESS NOTES
Care Management Follow Up    Length of Stay (days): 3    Expected Discharge Date: 02/19/2024    Concerns to be Addressed:   Care post CABG 2/14/24; IV Lasix and Amiodarone drip. See MD and nursing notes for details;  Patient plan of care discussed at interdisciplinary rounds: Yes    Anticipated Discharge Disposition:  Discharge goal is home.      Anticipated Discharge Services:  Outpatient cardiac rehab  Anticipated Discharge DME:  Per therapy (if indicated).    Patient/family educated on Medicare website which has current facility and service quality ratings:   NA  Education Provided on the Discharge Plan:   Per team  Patient/Family in Agreement with the Plan:   Yes    Referrals Placed by CM/SW:  None   Private pay costs discussed: Not applicable     Additional Information:  Patient is  and independent with all activities of daily living at baseline. Therapy agrees with a home discharge and recommends outpatient cardiac rehab  No care management needs identified at this time but CM will continue to monitor progression of care, review team recommendations and provide discharge planning assist as needed.      Selin Diaz RN

## 2024-02-18 ENCOUNTER — APPOINTMENT (OUTPATIENT)
Dept: OCCUPATIONAL THERAPY | Facility: HOSPITAL | Age: 79
DRG: 236 | End: 2024-02-18
Attending: THORACIC SURGERY (CARDIOTHORACIC VASCULAR SURGERY)
Payer: MEDICARE

## 2024-02-18 LAB
ANION GAP SERPL CALCULATED.3IONS-SCNC: 10 MMOL/L (ref 7–15)
BUN SERPL-MCNC: 40.5 MG/DL (ref 8–23)
CA-I BLD-MCNC: 4.2 MG/DL (ref 4.4–5.2)
CA-I BLD-MCNC: 4.3 MG/DL (ref 4.4–5.2)
CA-I BLD-MCNC: 4.3 MG/DL (ref 4.4–5.2)
CALCIUM SERPL-MCNC: 8.1 MG/DL (ref 8.8–10.2)
CHLORIDE SERPL-SCNC: 105 MMOL/L (ref 98–107)
CREAT SERPL-MCNC: 1.17 MG/DL (ref 0.67–1.17)
DEPRECATED HCO3 PLAS-SCNC: 27 MMOL/L (ref 22–29)
EGFRCR SERPLBLD CKD-EPI 2021: 63 ML/MIN/1.73M2
GLUCOSE BLDC GLUCOMTR-MCNC: 121 MG/DL (ref 70–99)
GLUCOSE BLDC GLUCOMTR-MCNC: 127 MG/DL (ref 70–99)
GLUCOSE BLDC GLUCOMTR-MCNC: 146 MG/DL (ref 70–99)
GLUCOSE BLDC GLUCOMTR-MCNC: 153 MG/DL (ref 70–99)
GLUCOSE SERPL-MCNC: 109 MG/DL (ref 70–99)
MAGNESIUM SERPL-MCNC: 2.5 MG/DL (ref 1.7–2.3)
PHOSPHATE SERPL-MCNC: 2.3 MG/DL (ref 2.5–4.5)
PLATELET # BLD AUTO: 192 10E3/UL (ref 150–450)
POTASSIUM SERPL-SCNC: 3.9 MMOL/L (ref 3.4–5.3)
POTASSIUM SERPL-SCNC: 4 MMOL/L (ref 3.4–5.3)
POTASSIUM SERPL-SCNC: 4.1 MMOL/L (ref 3.4–5.3)
SODIUM SERPL-SCNC: 142 MMOL/L (ref 135–145)

## 2024-02-18 PROCEDURE — 82330 ASSAY OF CALCIUM: CPT | Performed by: PHYSICIAN ASSISTANT

## 2024-02-18 PROCEDURE — 84132 ASSAY OF SERUM POTASSIUM: CPT | Performed by: THORACIC SURGERY (CARDIOTHORACIC VASCULAR SURGERY)

## 2024-02-18 PROCEDURE — 97110 THERAPEUTIC EXERCISES: CPT | Mod: GO

## 2024-02-18 PROCEDURE — 80048 BASIC METABOLIC PNL TOTAL CA: CPT | Performed by: PHYSICIAN ASSISTANT

## 2024-02-18 PROCEDURE — 36415 COLL VENOUS BLD VENIPUNCTURE: CPT | Performed by: THORACIC SURGERY (CARDIOTHORACIC VASCULAR SURGERY)

## 2024-02-18 PROCEDURE — 82330 ASSAY OF CALCIUM: CPT | Performed by: THORACIC SURGERY (CARDIOTHORACIC VASCULAR SURGERY)

## 2024-02-18 PROCEDURE — 83735 ASSAY OF MAGNESIUM: CPT | Performed by: THORACIC SURGERY (CARDIOTHORACIC VASCULAR SURGERY)

## 2024-02-18 PROCEDURE — 999N000156 HC STATISTIC RCP CONSULT EA 30 MIN

## 2024-02-18 PROCEDURE — 250N000013 HC RX MED GY IP 250 OP 250 PS 637: Performed by: THORACIC SURGERY (CARDIOTHORACIC VASCULAR SURGERY)

## 2024-02-18 PROCEDURE — 36415 COLL VENOUS BLD VENIPUNCTURE: CPT | Performed by: PHYSICIAN ASSISTANT

## 2024-02-18 PROCEDURE — 97535 SELF CARE MNGMENT TRAINING: CPT | Mod: GO

## 2024-02-18 PROCEDURE — 250N000013 HC RX MED GY IP 250 OP 250 PS 637: Performed by: PHYSICIAN ASSISTANT

## 2024-02-18 PROCEDURE — 85049 AUTOMATED PLATELET COUNT: CPT | Performed by: PHYSICIAN ASSISTANT

## 2024-02-18 PROCEDURE — 250N000011 HC RX IP 250 OP 636: Performed by: PHYSICIAN ASSISTANT

## 2024-02-18 PROCEDURE — 200N000001 HC R&B ICU

## 2024-02-18 PROCEDURE — 999N000127 HC STATISTIC PERIPHERAL IV START W US GUIDANCE

## 2024-02-18 PROCEDURE — 84100 ASSAY OF PHOSPHORUS: CPT | Performed by: THORACIC SURGERY (CARDIOTHORACIC VASCULAR SURGERY)

## 2024-02-18 RX ORDER — ASPIRIN 81 MG/1
81 TABLET, CHEWABLE ORAL DAILY
Status: DISCONTINUED | OUTPATIENT
Start: 2024-02-18 | End: 2024-02-22 | Stop reason: HOSPADM

## 2024-02-18 RX ADMIN — LIDOCAINE 1 PATCH: 4 PATCH TOPICAL at 14:25

## 2024-02-18 RX ADMIN — PANTOPRAZOLE SODIUM 40 MG: 40 TABLET, DELAYED RELEASE ORAL at 09:36

## 2024-02-18 RX ADMIN — POTASSIUM & SODIUM PHOSPHATES POWDER PACK 280-160-250 MG 1 PACKET: 280-160-250 PACK at 12:28

## 2024-02-18 RX ADMIN — CALCIUM GLUCONATE 1 G: 20 INJECTION, SOLUTION INTRAVENOUS at 20:43

## 2024-02-18 RX ADMIN — ACETAMINOPHEN 650 MG: 325 TABLET ORAL at 01:51

## 2024-02-18 RX ADMIN — AMIODARONE HYDROCHLORIDE 400 MG: 200 TABLET ORAL at 09:36

## 2024-02-18 RX ADMIN — HEPARIN SODIUM 5000 UNITS: 5000 INJECTION, SOLUTION INTRAVENOUS; SUBCUTANEOUS at 06:19

## 2024-02-18 RX ADMIN — METOPROLOL TARTRATE 25 MG: 25 TABLET, FILM COATED ORAL at 09:35

## 2024-02-18 RX ADMIN — APIXABAN 5 MG: 5 TABLET, FILM COATED ORAL at 09:37

## 2024-02-18 RX ADMIN — ASPIRIN 81 MG CHEWABLE TABLET 81 MG: 81 TABLET CHEWABLE at 09:37

## 2024-02-18 RX ADMIN — NYSTATIN: 100000 CREAM TOPICAL at 09:39

## 2024-02-18 RX ADMIN — AMIODARONE HYDROCHLORIDE 400 MG: 200 TABLET ORAL at 20:43

## 2024-02-18 RX ADMIN — METOPROLOL TARTRATE 25 MG: 25 TABLET, FILM COATED ORAL at 20:44

## 2024-02-18 RX ADMIN — POTASSIUM & SODIUM PHOSPHATES POWDER PACK 280-160-250 MG 1 PACKET: 280-160-250 PACK at 09:35

## 2024-02-18 RX ADMIN — OXYCODONE HYDROCHLORIDE 5 MG: 5 TABLET ORAL at 12:28

## 2024-02-18 RX ADMIN — NYSTATIN: 100000 CREAM TOPICAL at 20:44

## 2024-02-18 RX ADMIN — APIXABAN 5 MG: 5 TABLET, FILM COATED ORAL at 20:44

## 2024-02-18 RX ADMIN — ROSUVASTATIN CALCIUM 40 MG: 40 TABLET, COATED ORAL at 20:44

## 2024-02-18 RX ADMIN — POTASSIUM & SODIUM PHOSPHATES POWDER PACK 280-160-250 MG 1 PACKET: 280-160-250 PACK at 14:26

## 2024-02-18 RX ADMIN — FUROSEMIDE 40 MG: 10 INJECTION, SOLUTION INTRAMUSCULAR; INTRAVENOUS at 11:22

## 2024-02-18 RX ADMIN — CALCIUM GLUCONATE 1 G: 20 INJECTION, SOLUTION INTRAVENOUS at 14:25

## 2024-02-18 RX ADMIN — FUROSEMIDE 40 MG: 10 INJECTION, SOLUTION INTRAMUSCULAR; INTRAVENOUS at 18:21

## 2024-02-18 RX ADMIN — CALCIUM GLUCONATE 1 G: 20 INJECTION, SOLUTION INTRAVENOUS at 06:18

## 2024-02-18 RX ADMIN — INSULIN ASPART 1 UNITS: 100 INJECTION, SOLUTION INTRAVENOUS; SUBCUTANEOUS at 12:27

## 2024-02-18 ASSESSMENT — ACTIVITIES OF DAILY LIVING (ADL)
ADLS_ACUITY_SCORE: 32
ADLS_ACUITY_SCORE: 33
ADLS_ACUITY_SCORE: 36
ADLS_ACUITY_SCORE: 32

## 2024-02-18 NOTE — PROGRESS NOTES
"CVTS Daily Progress Note   POD#4 s/p CAB x 3(Separate reversed saphenous vein grafts to right posterior descending, the obtuse marginal 1 and the left anterior descending diagonal branch coronary arteries)  Attending: Dr. Flanagan  LOS: 4    SUBJECTIVE/INTERVAL EVENTS:    No acute events overnight. NSR. Single episode of a-fib yesterday. Patient progressing well. Maintaining oxygen saturations on 2L NC. Normotensive. Up to chair this am. Pain well controlled. +BM / +flatus. Tolerating diet. UOP adequate. Hgb stable. Patient denies new chest pain, shortness of breath, abdominal pain, calf pain, nausea. Patient has no questions today.     OBJECTIVE:  Temp:  [98  F (36.7  C)-99.8  F (37.7  C)] 98  F (36.7  C)  Pulse:  [] 84  Resp:  [20-22] 22  BP: (100-116)/(52-63) 111/62  SpO2:  [90 %-95 %] 92 %  Vitals:    02/14/24 0524 02/15/24 0525 02/17/24 0655 02/18/24 0615   Weight: 104.8 kg (231 lb) 104.1 kg (229 lb 9.6 oz) 109.1 kg (240 lb 9.6 oz) 107.2 kg (236 lb 4.8 oz)       Clinically Significant Risk Factors          # Hypocalcemia: Lowest iCa = 4.3 mg/dL in last 2 days, will monitor and replace as appropriate     # Hypoalbuminemia: Lowest albumin = 3 g/dL at 2/14/2024  1:01 PM, will monitor as appropriate       # Hypertension: Noted on problem list        # Obesity: Estimated body mass index is 33.91 kg/m  as calculated from the following:    Height as of this encounter: 1.778 m (5' 10\").    Weight as of this encounter: 107.2 kg (236 lb 4.8 oz).       # History of CABG: noted on surgical history      Current Medications:    Scheduled Meds:   amiodarone  400 mg Oral BID    Followed by    [START ON 2/21/2024] amiodarone  200 mg Oral BID    Followed by    [START ON 2/23/2024] amiodarone  200 mg Oral Daily    apixaban ANTICOAGULANT  5 mg Oral BID    aspirin  81 mg Oral or NG Tube Daily    furosemide  40 mg Intravenous BID    insulin aspart  1-7 Units Subcutaneous TID AC    insulin aspart  1-5 Units Subcutaneous At " Bedtime    lidocaine  1-2 patch Transdermal Q24H    metoprolol tartrate  25 mg Oral BID    nystatin   Topical BID    pantoprazole  40 mg Oral Daily    polyethylene glycol  17 g Oral Daily    potassium & sodium phosphates  1 packet Oral or Feeding Tube Q4H    rosuvastatin  40 mg Oral QPM    senna-docusate  1 tablet Oral BID     Continuous Infusions:   BETA BLOCKER NOT PRESCRIBED       PRN Meds:.acetaminophen, bisacodyl, calcium gluconate, calcium gluconate, calcium gluconate, glucose **OR** dextrose **OR** glucagon, hydrALAZINE, ipratropium - albuterol 0.5 mg/2.5 mg/3 mL, lactated ringers, magnesium hydroxide, naloxone **OR** naloxone **OR** naloxone **OR** naloxone, ondansetron **OR** ondansetron, oxyCODONE **OR** oxyCODONE, prochlorperazine **OR** prochlorperazine, BETA BLOCKER NOT PRESCRIBED    Cardiographics:    Telemetry monitoring demonstrates NSR with rate at 80's per my personal review.    Imaging:  Results for orders placed or performed during the hospital encounter of 02/14/24   XR Chest Port 1 View    Impression    IMPRESSION: Postsurgical changes from interval coronary artery bypass grafting. Endotracheal tube terminates 4.3 cm above the marcel. Right neck central venous catheter with tip in the upper SVC. Median sternotomy wires, ascending mediastinal drain, and   left chest tube also present.    The lungs are underinflated with probable left basilar atelectasis. No significant pleural effusion or pneumothorax.    Normal cardiomediastinal silhouette.   XR Chest Port 1 View    Impression    IMPRESSION:     Interval extubation. Sternotomy with post-CABG changes. Stable mediastinal and left chest tubes. No pneumothorax. Moderate hypoinflation. Mild elevation right hemidiaphragm. Right basilar atelectasis. Cardiomegaly with mild pulmonary vascular prominence   or congestion. Chest otherwise unremarkable.                       Labs, personally reviewed.  Hemoglobin   Date Value Ref Range Status   02/16/2024  8.9 (L) 13.3 - 17.7 g/dL Final   02/15/2024 9.4 (L) 13.3 - 17.7 g/dL Final   02/14/2024 11.7 (L) 13.3 - 17.7 g/dL Final     Hemoglobin POCT   Date Value Ref Range Status   02/14/2024 10.5 (L) 13.3 - 17.7 g/dL Final   02/14/2024 9.2 (L) 13.3 - 17.7 g/dL Final   02/14/2024 9.3 (L) 13.3 - 17.7 g/dL Final     WBC Count   Date Value Ref Range Status   02/16/2024 13.2 (H) 4.0 - 11.0 10e3/uL Final   02/15/2024 14.7 (H) 4.0 - 11.0 10e3/uL Final   02/14/2024 19.9 (H) 4.0 - 11.0 10e3/uL Final     Platelet Count   Date Value Ref Range Status   02/18/2024 192 150 - 450 10e3/uL Final   02/16/2024 134 (L) 150 - 450 10e3/uL Final   02/15/2024 167 150 - 450 10e3/uL Final     Creatinine   Date Value Ref Range Status   02/16/2024 1.41 (H) 0.67 - 1.17 mg/dL Final   02/15/2024 1.29 (H) 0.67 - 1.17 mg/dL Final   02/14/2024 0.95 0.67 - 1.17 mg/dL Final     Potassium   Date Value Ref Range Status   02/18/2024 3.9 3.4 - 5.3 mmol/L Final   02/17/2024 3.9 3.4 - 5.3 mmol/L Final   02/16/2024 4.4 3.4 - 5.3 mmol/L Final   02/16/2024 4.4 3.4 - 5.3 mmol/L Final   02/22/2022 4.8 3.5 - 5.0 mmol/L Final   02/04/2022 5.1 (H) 3.5 - 5.0 mmol/L Final   04/05/2021 4.5 3.5 - 5.0 mmol/L Final     Potassium POCT   Date Value Ref Range Status   02/14/2024 4.1 3.4 - 5.3 mmol/L Final   02/14/2024 3.8 3.4 - 5.3 mmol/L Final   02/14/2024 4.8 3.4 - 5.3 mmol/L Final     Magnesium   Date Value Ref Range Status   02/18/2024 2.5 (H) 1.7 - 2.3 mg/dL Final   02/17/2024 2.7 (H) 1.7 - 2.3 mg/dL Final   02/16/2024 2.9 (H) 1.7 - 2.3 mg/dL Final          I/O:  I/O last 3 completed shifts:  In: 240 [P.O.:240]  Out: 1550 [Urine:1550]       Physical Exam:    General: Patient seen up in chair. NAD. Conversant. Pleasant.   HEENT: GERALDO, no sclera icterus, moist mucosa  CV: RRR on monitor. 2+ peripheral pulses in all extremities. Mild edema.   Pulm: Non-labored effort on 2L NC. Incision C/D/I.  Abd: Soft, NT, ND  : Voiding  Ext: Mild pedal edema, SCDs in place, warm, distal  pulses intact  Neuro: CNs grossly intact.       ASSESSMENT/PLAN:    Nick Zaldivar is a 78 year old male with a history of CAD and aortic stenosis who is s/p CAB x 3.    Principal Problem:    S/P AVR (aortic valve replacement)        NEURO:   - Scheduled Tylenol/lidocaine patches and PRN Tylenol/oxycodone for pain    CV:   - Pre-op EF 60-65%  - Normotensive  - Pressors off 2/15  - Metoprolol 25 mg PO BID with parameters  - Amiodarone PO taper dosing   - ASA 81mg(decreased with Eliquis)  - Rosuvastatin 40 mg daily  - Chest tubes and TPW's removed without complications      PULM:   - Extubated POD #0  - Maintaining oxygen saturations on 2L NC  - Encourage pulmonary toilet    FEN/GI:  - Continue electrolyte replacement protocol  - Diet: Cardiac, ADAT   - Bowel regimen    RENAL:  - Adequate UOP/hr. Continue to monitor closely.  - Cr 1.41(1.29)(baseline 0.96)  - Salazar removed POD#2  - Diuresis with Lasix 40 mg IV BID  - BMP pending today    HEME:  - Acute blood loss anemia post-op.   - Hgb 9.4, no bleeding concerns. Hep SQ, ASA  - Eliquis 75 mg PO BID(a-fib)    ID:  - Tori op ppx complete, afebrile . No concerns for infection    ENDO:   - Transition to SSI     PPx:   - DVT: SCDs, SQ heparin TID, ambulation   - GI: Protonix 40mg PO daily    DISPO:   - Transfer to general telemetry unit POD#2  - Barriers to discharge-shower, wean oxygen and stairs, needs staple removed left LE      Patient discussed with  and Dr Sherie GutiérrezPAWAYNE  [facility] Cardiothoracic Surgery  Vocera or pager [phone]

## 2024-02-18 NOTE — TREATMENT PLAN
RCAT Treatment Plan    Patient Score: 7  Patient Acuity: 4    Clinical Indication for Therapy: prevent atelectasis    Therapy Ordered: Flutter valve TID    Assessment Summary: PT has been doing well with IS and flutter valve. He is able to perform both independently. BS clear, 96 % SP02 on 2 LPM N/C. Change flutter valve to PRN.    Remy Louis, RT  2/18/2024

## 2024-02-19 ENCOUNTER — APPOINTMENT (OUTPATIENT)
Dept: OCCUPATIONAL THERAPY | Facility: HOSPITAL | Age: 79
DRG: 236 | End: 2024-02-19
Attending: THORACIC SURGERY (CARDIOTHORACIC VASCULAR SURGERY)
Payer: MEDICARE

## 2024-02-19 ENCOUNTER — APPOINTMENT (OUTPATIENT)
Dept: RADIOLOGY | Facility: HOSPITAL | Age: 79
DRG: 236 | End: 2024-02-19
Attending: PHYSICIAN ASSISTANT
Payer: MEDICARE

## 2024-02-19 LAB
CA-I BLD-MCNC: 4.5 MG/DL (ref 4.4–5.2)
GLUCOSE BLDC GLUCOMTR-MCNC: 123 MG/DL (ref 70–99)
GLUCOSE BLDC GLUCOMTR-MCNC: 132 MG/DL (ref 70–99)
GLUCOSE BLDC GLUCOMTR-MCNC: 147 MG/DL (ref 70–99)
GLUCOSE BLDC GLUCOMTR-MCNC: 153 MG/DL (ref 70–99)
GLUCOSE BLDC GLUCOMTR-MCNC: 187 MG/DL (ref 70–99)
MAGNESIUM SERPL-MCNC: 2.3 MG/DL (ref 1.7–2.3)
PHOSPHATE SERPL-MCNC: 3.4 MG/DL (ref 2.5–4.5)
POTASSIUM SERPL-SCNC: 4.4 MMOL/L (ref 3.4–5.3)

## 2024-02-19 PROCEDURE — 94799 UNLISTED PULMONARY SVC/PX: CPT

## 2024-02-19 PROCEDURE — 97110 THERAPEUTIC EXERCISES: CPT | Mod: GO

## 2024-02-19 PROCEDURE — 250N000013 HC RX MED GY IP 250 OP 250 PS 637: Performed by: PHYSICIAN ASSISTANT

## 2024-02-19 PROCEDURE — 94640 AIRWAY INHALATION TREATMENT: CPT | Mod: 76

## 2024-02-19 PROCEDURE — 999N000156 HC STATISTIC RCP CONSULT EA 30 MIN

## 2024-02-19 PROCEDURE — 82330 ASSAY OF CALCIUM: CPT | Performed by: PHYSICIAN ASSISTANT

## 2024-02-19 PROCEDURE — 250N000011 HC RX IP 250 OP 636: Performed by: PHYSICIAN ASSISTANT

## 2024-02-19 PROCEDURE — 210N000001 HC R&B IMCU HEART CARE

## 2024-02-19 PROCEDURE — 97535 SELF CARE MNGMENT TRAINING: CPT | Mod: GO

## 2024-02-19 PROCEDURE — 83735 ASSAY OF MAGNESIUM: CPT | Performed by: THORACIC SURGERY (CARDIOTHORACIC VASCULAR SURGERY)

## 2024-02-19 PROCEDURE — 71046 X-RAY EXAM CHEST 2 VIEWS: CPT

## 2024-02-19 PROCEDURE — 999N000157 HC STATISTIC RCP TIME EA 10 MIN

## 2024-02-19 PROCEDURE — 84132 ASSAY OF SERUM POTASSIUM: CPT | Performed by: THORACIC SURGERY (CARDIOTHORACIC VASCULAR SURGERY)

## 2024-02-19 PROCEDURE — 84100 ASSAY OF PHOSPHORUS: CPT | Performed by: THORACIC SURGERY (CARDIOTHORACIC VASCULAR SURGERY)

## 2024-02-19 PROCEDURE — 250N000009 HC RX 250: Performed by: PHYSICIAN ASSISTANT

## 2024-02-19 PROCEDURE — 94640 AIRWAY INHALATION TREATMENT: CPT

## 2024-02-19 PROCEDURE — 36415 COLL VENOUS BLD VENIPUNCTURE: CPT | Performed by: THORACIC SURGERY (CARDIOTHORACIC VASCULAR SURGERY)

## 2024-02-19 RX ORDER — IPRATROPIUM BROMIDE AND ALBUTEROL SULFATE 2.5; .5 MG/3ML; MG/3ML
3 SOLUTION RESPIRATORY (INHALATION)
Status: DISCONTINUED | OUTPATIENT
Start: 2024-02-19 | End: 2024-02-22 | Stop reason: HOSPADM

## 2024-02-19 RX ADMIN — METOPROLOL TARTRATE 25 MG: 25 TABLET, FILM COATED ORAL at 21:22

## 2024-02-19 RX ADMIN — ASPIRIN 81 MG CHEWABLE TABLET 81 MG: 81 TABLET CHEWABLE at 08:34

## 2024-02-19 RX ADMIN — NYSTATIN: 100000 CREAM TOPICAL at 08:35

## 2024-02-19 RX ADMIN — APIXABAN 5 MG: 5 TABLET, FILM COATED ORAL at 21:22

## 2024-02-19 RX ADMIN — INSULIN ASPART 1 UNITS: 100 INJECTION, SOLUTION INTRAVENOUS; SUBCUTANEOUS at 16:46

## 2024-02-19 RX ADMIN — METOPROLOL TARTRATE 25 MG: 25 TABLET, FILM COATED ORAL at 08:34

## 2024-02-19 RX ADMIN — IPRATROPIUM BROMIDE AND ALBUTEROL SULFATE 3 ML: .5; 3 SOLUTION RESPIRATORY (INHALATION) at 11:55

## 2024-02-19 RX ADMIN — AMIODARONE HYDROCHLORIDE 400 MG: 200 TABLET ORAL at 08:34

## 2024-02-19 RX ADMIN — LIDOCAINE 2 PATCH: 4 PATCH TOPICAL at 13:24

## 2024-02-19 RX ADMIN — PANTOPRAZOLE SODIUM 40 MG: 40 TABLET, DELAYED RELEASE ORAL at 08:35

## 2024-02-19 RX ADMIN — IPRATROPIUM BROMIDE AND ALBUTEROL SULFATE 3 ML: .5; 3 SOLUTION RESPIRATORY (INHALATION) at 19:27

## 2024-02-19 RX ADMIN — FUROSEMIDE 40 MG: 10 INJECTION, SOLUTION INTRAMUSCULAR; INTRAVENOUS at 18:36

## 2024-02-19 RX ADMIN — ROSUVASTATIN CALCIUM 40 MG: 40 TABLET, COATED ORAL at 21:22

## 2024-02-19 RX ADMIN — FUROSEMIDE 40 MG: 10 INJECTION, SOLUTION INTRAMUSCULAR; INTRAVENOUS at 08:34

## 2024-02-19 RX ADMIN — NYSTATIN: 100000 CREAM TOPICAL at 21:33

## 2024-02-19 RX ADMIN — IPRATROPIUM BROMIDE AND ALBUTEROL SULFATE 3 ML: .5; 3 SOLUTION RESPIRATORY (INHALATION) at 15:41

## 2024-02-19 RX ADMIN — AMIODARONE HYDROCHLORIDE 400 MG: 200 TABLET ORAL at 21:22

## 2024-02-19 RX ADMIN — APIXABAN 5 MG: 5 TABLET, FILM COATED ORAL at 08:35

## 2024-02-19 ASSESSMENT — ACTIVITIES OF DAILY LIVING (ADL)
ADLS_ACUITY_SCORE: 32

## 2024-02-19 NOTE — PLAN OF CARE
POD 5 - CABG x 3    Problem: Adult Inpatient Plan of Care  Goal: Plan of Care Review  Outcome: Progressing  Flowsheets (Taken 2/19/2024 1336)  Plan of Care Reviewed With:   patient   family   spouse  Overall Patient Progress: improving  Updated patient, spouse and dtr, Larissa at bedside. Larissa also reports that she did receive an update from Dr. Flanagan this afternoon over the phone. CM and cardiac rehab will see Larissa at the bedside at 1400 per her request.   Anticipate discharge to TCU when stable.    Will shower later today. No new concerns with surgical incisions.     Problem: Comorbidity Management  Goal: Blood Pressure in Desired Range  Outcome: Progressing  Intervention: Maintain Blood Pressure Management  Recent Flowsheet Documentation  Taken 2/19/2024 1300 by Yadira Snider RN  Medication Review/Management: medications reviewed  Taken 2/19/2024 0839 by Yadira Snider, RN  Medication Review/Management: medications reviewed  /56.  Tele: SR.   Denies pain, palpitations, N/V. Does c/o some dizziness with position changes. No syncope. Encouraged to take time between position changes. Assist x 1 with transfers/ambulation.      Problem: Cardiovascular Surgery  Goal: Improved Activity Tolerance  Outcome: Progressing  Intervention: Optimize Tolerance for Activity  Recent Flowsheet Documentation  Taken 2/19/2024 1300 by Yadira Snider, RN  Environmental Support: calm environment promoted  Taken 2/19/2024 0839 by Yadira Snider, RN  Environmental Support: calm environment promoted  Up to chair x 2 this shift. Ambulated 150 ft with cardiac rehab this morning. CARRILLO. Therapy does not think he is ready for stairs yet. Larissa reports they have 13 steps inside their house to get to the bathroom and bedroom.     Goal: Fluid and Electrolyte Balance  Outcome: Progressing  Wt Readings from Last 3 Encounters:   02/19/24 107 kg (235 lb 12.8 oz)   02/06/24 105.3 kg (232 lb 3.2 oz)   01/24/24 106.6 kg (235 lb)    Continue IV Lasix bid.  +2 generalized edema.   Elevate legs between activity.   Expiratory wheezes and crackles. CXR done this am, see results. Started on NEBs 4x/d.   Electrolytes within range. Recheck in am.   Good PO intake.   Strict I&O's.   Last BM yesterday. Declined AM Miralax and Senna today. Bowel sounds active.     Goal: Effective Oxygenation and Ventilation  Outcome: Progressing  Intervention: Promote Airway Secretion Clearance  Recent Flowsheet Documentation  Taken 2/19/2024 1300 by Yadira Snider, RN  Administration (IS):   instruction provided, follow-up   proper technique demonstrated   self-administered  Cough And Deep Breathing: done independently per patient  Patient Tolerance (IS): good  Taken 2/19/2024 0839 by Yadira Snider RN  Administration (IS):   instruction provided, follow-up   proper technique demonstrated   self-administered  Level Incentive Spirometer (mL): 1000  Cough And Deep Breathing: done independently per patient  Number of Repetitions (IS): 5  Patient Tolerance (IS): good  Tolerating O2 wean from 2L nc to 1L nc, SpO2 >90%. Shallow breathing at rest. Demonstrated 1000 on IS this morning. Encourage to continue breathing excercies q1-2h.        Goal Outcome Evaluation:      Plan of Care Reviewed With: patient, family, spouse    Overall Patient Progress: improvingOverall Patient Progress: improving

## 2024-02-19 NOTE — PROGRESS NOTES
"CVTS Daily Progress Note   POD#5 s/p CAB x 3(Separate reversed saphenous vein grafts to right posterior descending, the obtuse marginal 1 and the left anterior descending diagonal branch coronary arteries)  Attending: Dr. Flanagan  LOS: 5    SUBJECTIVE/INTERVAL EVENTS:    No acute events overnight. Wheezing this am. Maintaining oxygen saturations on 2L NC. NSR. Patient progressing well. Normotensive. Resting in bed today. Pain well controlled. +BM / +flatus. Tolerating diet. UOP adequate. Hgb stable. Patient denies new chest pain, shortness of breath, abdominal pain, calf pain, nausea. Patient has no questions today.     OBJECTIVE:  Temp:  [97.8  F (36.6  C)-98.7  F (37.1  C)] 97.9  F (36.6  C)  Pulse:  [67-86] 75  Resp:  [18-22] 18  BP: (104-147)/(55-67) 116/67  SpO2:  [92 %-96 %] 96 %  Vitals:    02/14/24 0524 02/15/24 0525 02/17/24 0655 02/18/24 0615   Weight: 104.8 kg (231 lb) 104.1 kg (229 lb 9.6 oz) 109.1 kg (240 lb 9.6 oz) 107.2 kg (236 lb 4.8 oz)    02/19/24 0245   Weight: 107 kg (235 lb 12.8 oz)       Clinically Significant Risk Factors          # Hypocalcemia: Lowest iCa = 4.2 mg/dL in last 2 days, will monitor and replace as appropriate     # Hypoalbuminemia: Lowest albumin = 3 g/dL at 2/14/2024  1:01 PM, will monitor as appropriate       # Hypertension: Noted on problem list        # Obesity: Estimated body mass index is 33.83 kg/m  as calculated from the following:    Height as of this encounter: 1.778 m (5' 10\").    Weight as of this encounter: 107 kg (235 lb 12.8 oz).       # History of CABG: noted on surgical history      Current Medications:    Scheduled Meds:    amiodarone  400 mg Oral BID    Followed by     [START ON 2/21/2024] amiodarone  200 mg Oral BID    Followed by     [START ON 2/23/2024] amiodarone  200 mg Oral Daily     apixaban ANTICOAGULANT  5 mg Oral BID     aspirin  81 mg Oral or NG Tube Daily     furosemide  40 mg Intravenous BID     insulin aspart  1-7 Units Subcutaneous TID AC     " insulin aspart  1-5 Units Subcutaneous At Bedtime     ipratropium - albuterol 0.5 mg/2.5 mg/3 mL  3 mL Nebulization 4x daily     lidocaine  1-2 patch Transdermal Q24H     metoprolol tartrate  25 mg Oral BID     nystatin   Topical BID     pantoprazole  40 mg Oral Daily     polyethylene glycol  17 g Oral Daily     rosuvastatin  40 mg Oral QPM     senna-docusate  1 tablet Oral BID     Continuous Infusions:    BETA BLOCKER NOT PRESCRIBED       PRN Meds:.acetaminophen, bisacodyl, calcium gluconate, calcium gluconate, calcium gluconate, glucose **OR** dextrose **OR** glucagon, hydrALAZINE, ipratropium - albuterol 0.5 mg/2.5 mg/3 mL, lactated ringers, magnesium hydroxide, naloxone **OR** naloxone **OR** naloxone **OR** naloxone, ondansetron **OR** ondansetron, oxyCODONE **OR** oxyCODONE, prochlorperazine **OR** prochlorperazine, BETA BLOCKER NOT PRESCRIBED    Cardiographics:    Telemetry monitoring demonstrates NSR with rate at 80's per my personal review.    Imaging:  Results for orders placed or performed during the hospital encounter of 02/14/24   XR Chest Port 1 View    Impression    IMPRESSION: Postsurgical changes from interval coronary artery bypass grafting. Endotracheal tube terminates 4.3 cm above the marcel. Right neck central venous catheter with tip in the upper SVC. Median sternotomy wires, ascending mediastinal drain, and   left chest tube also present.    The lungs are underinflated with probable left basilar atelectasis. No significant pleural effusion or pneumothorax.    Normal cardiomediastinal silhouette.   XR Chest Port 1 View    Impression    IMPRESSION:     Interval extubation. Sternotomy with post-CABG changes. Stable mediastinal and left chest tubes. No pneumothorax. Moderate hypoinflation. Mild elevation right hemidiaphragm. Right basilar atelectasis. Cardiomegaly with mild pulmonary vascular prominence   or congestion. Chest otherwise unremarkable.                       Labs, personally  reviewed.  Hemoglobin   Date Value Ref Range Status   02/16/2024 8.9 (L) 13.3 - 17.7 g/dL Final   02/15/2024 9.4 (L) 13.3 - 17.7 g/dL Final   02/14/2024 11.7 (L) 13.3 - 17.7 g/dL Final     Hemoglobin POCT   Date Value Ref Range Status   02/14/2024 10.5 (L) 13.3 - 17.7 g/dL Final   02/14/2024 9.2 (L) 13.3 - 17.7 g/dL Final   02/14/2024 9.3 (L) 13.3 - 17.7 g/dL Final     WBC Count   Date Value Ref Range Status   02/16/2024 13.2 (H) 4.0 - 11.0 10e3/uL Final   02/15/2024 14.7 (H) 4.0 - 11.0 10e3/uL Final   02/14/2024 19.9 (H) 4.0 - 11.0 10e3/uL Final     Platelet Count   Date Value Ref Range Status   02/18/2024 192 150 - 450 10e3/uL Final   02/16/2024 134 (L) 150 - 450 10e3/uL Final   02/15/2024 167 150 - 450 10e3/uL Final     Creatinine   Date Value Ref Range Status   02/18/2024 1.17 0.67 - 1.17 mg/dL Final   02/16/2024 1.41 (H) 0.67 - 1.17 mg/dL Final   02/15/2024 1.29 (H) 0.67 - 1.17 mg/dL Final     Potassium   Date Value Ref Range Status   02/19/2024 4.4 3.4 - 5.3 mmol/L Final   02/18/2024 4.1 3.4 - 5.3 mmol/L Final   02/18/2024 3.9 3.4 - 5.3 mmol/L Final   02/18/2024 4.0 3.4 - 5.3 mmol/L Final   02/22/2022 4.8 3.5 - 5.0 mmol/L Final   02/04/2022 5.1 (H) 3.5 - 5.0 mmol/L Final   04/05/2021 4.5 3.5 - 5.0 mmol/L Final     Potassium POCT   Date Value Ref Range Status   02/14/2024 4.1 3.4 - 5.3 mmol/L Final   02/14/2024 3.8 3.4 - 5.3 mmol/L Final   02/14/2024 4.8 3.4 - 5.3 mmol/L Final     Magnesium   Date Value Ref Range Status   02/19/2024 2.3 1.7 - 2.3 mg/dL Final   02/18/2024 2.5 (H) 1.7 - 2.3 mg/dL Final   02/17/2024 2.7 (H) 1.7 - 2.3 mg/dL Final          I/O:  I/O last 3 completed shifts:  In: 1080 [P.O.:1080]  Out: 1900 [Urine:1900]       Physical Exam:    General: Patient seen in bed this am. NAD. Conversant. Pleasant.   HEENT: GERALDO, no sclera icterus, moist mucosa  CV: RRR on monitor. 2+ peripheral pulses in all extremities. Mild edema.   Pulm: Non-labored effort on 2L NC. Incision C/D/I. Wheezing today.  Abd:  Soft, NT, ND  : Voiding  Ext: Mild pedal edema, SCDs in place, warm, distal pulses intact  Neuro: CNs grossly intact.       ASSESSMENT/PLAN:    Nick Zaldivar is a 78 year old male with a history of CAD and aortic stenosis who is s/p CAB x 3.    Principal Problem:    S/P AVR (aortic valve replacement)        NEURO:   - Scheduled Tylenol/lidocaine patches and PRN Tylenol/oxycodone for pain    CV:   - Pre-op EF 60-65%  - Normotensive  - Pressors off 2/15  - Metoprolol 25 mg PO BID with parameters  - Amiodarone PO taper dosing   - ASA 81mg(decreased with Eliquis)  - Rosuvastatin 40 mg daily  - Chest tubes and TPW's removed without complications      PULM:   - Extubated POD #0  - Maintaining oxygen saturations on 2L NC  - Encourage pulmonary toilet  - Duo nebs QID and PRN  - CXR pending    FEN/GI:  - Continue electrolyte replacement protocol  - Diet: Cardiac, ADAT   - Bowel regimen    RENAL:  - Adequate UOP/hr. Continue to monitor closely.  - Cr 1.17(1.41)(baseline 0.96)  - Salazar removed POD#2  - Diuresis with Lasix 40 mg IV BID    HEME:  - Acute blood loss anemia post-op.   - Hgb 9.4, no bleeding concerns. Hep SQ, ASA  - Eliquis 5 mg PO BID(post op a-fib)    ID:  - Tori op ppx complete, afebrile . No concerns for infection    ENDO:   - Transition to SSI     PPx:   - DVT: SCDs, SQ heparin TID, ambulation   - GI: Protonix 40mg PO daily    DISPO:   - Transfer to general telemetry unit POD#2  - Barriers to discharge-wean oxygen and stairs, needs staple removed left LE  - ARU 24-48 hrs per Rehab rec, referrals sent today      Patient discussed with  and dr Panchito Gutiérrez PA-C  [facility]  Vocera or pager [phone]

## 2024-02-19 NOTE — PROGRESS NOTES
"Care Management Follow Up    Length of Stay (days): 5    Expected Discharge Date: 02/19/2024     Concerns to be Addressed:     Care progression  Patient plan of care discussed at interdisciplinary rounds: Yes    Anticipated Discharge Disposition:  Acute Rehab Center     Anticipated Discharge Services:  Acute Rehab Center  Anticipated Discharge DME:  [facility]    Patient/family educated on Medicare website which has current facility and service quality ratings:  NA  Education Provided on the Discharge Plan:  Yes per team  Patient/Family in Agreement with the Plan:  Yes    Referrals Placed by CM/SW:  Yes  Private pay costs discussed: Not applicable    Additional Information:  Met with patient at bedside to discuss OT discharge rec for Acute Rehab Center.  Patient in agreement.    Referral sent    Social Hx: \"CABG 2/14. Lives with spouse and adult kids in a house. Ind with ADLS and IADLS. No home care svcs. No mobility aides.Therapy recs [facility], referral sent. Family can transport.\"     RNCM to follow for medical progression, recommendations, and final discharge plan.     Alice Singh RN     "

## 2024-02-19 NOTE — PLAN OF CARE
[facility] - ICU    RN Progress Note:            Pertinent Assessments:      Please refer to flowsheet rows for full assessment     Pt Aox4. Up walking with cardiac rehab and primary RN. Gets sob with activity but getting better this afternoon per pt statement. Vital sign within acceptable limit. Pain med administered once this shift. Adequate uop.    Handoff report given to receiving RN.         Key Events - This Shift:     Pt took the 1st post op-heart surgery shower today.         SJN SAT (Sedation Awakening Trial): For use ONLY if intubated    Not applicable            Barriers to Discharge / Downgrade:   Getting sob with activity.         Point of Contact Update YES-OR-NO: Yes  Pt's wife and son were at the bedside this evening and updated about pt status.         Problem: Adult Inpatient Plan of Care  Goal: Absence of Hospital-Acquired Illness or Injury  Intervention: Identify and Manage Fall Risk  Recent Flowsheet Documentation  Taken 2/18/2024 1930 by Kem Sharma RN  Safety Promotion/Fall Prevention:   activity supervised   clutter free environment maintained   lighting adjusted  Taken 2/18/2024 1600 by Kem Sharma RN  Safety Promotion/Fall Prevention:   activity supervised   clutter free environment maintained   lighting adjusted  Taken 2/18/2024 1200 by Kem Sharma RN  Safety Promotion/Fall Prevention:   activity supervised   clutter free environment maintained   lighting adjusted  Taken 2/18/2024 0800 by Kem Sharma RN  Safety Promotion/Fall Prevention:   activity supervised   clutter free environment maintained   lighting adjusted  Intervention: Prevent Skin Injury  Recent Flowsheet Documentation  Taken 2/18/2024 1930 by Kem Sharma RN  Body Position: position changed independently  Skin Protection: incontinence pads utilized  Device Skin Pressure Protection:   absorbent pad utilized/changed   positioning supports utilized  Taken  2/18/2024 1600 by Kem Sharma RN  Body Position: position changed independently  Skin Protection: incontinence pads utilized  Device Skin Pressure Protection:   absorbent pad utilized/changed   positioning supports utilized  Taken 2/18/2024 1200 by Kem Sharma RN  Body Position: position changed independently  Skin Protection: incontinence pads utilized  Device Skin Pressure Protection:   absorbent pad utilized/changed   positioning supports utilized  Taken 2/18/2024 0800 by Kem Sharma RN  Body Position: position changed independently  Skin Protection: incontinence pads utilized  Device Skin Pressure Protection:   absorbent pad utilized/changed   positioning supports utilized  Intervention: Prevent and Manage VTE (Venous Thromboembolism) Risk  Recent Flowsheet Documentation  Taken 2/18/2024 1930 by Kem Sharma RN  VTE Prevention/Management: compression stockings on  Taken 2/18/2024 1600 by Kem Sharma RN  VTE Prevention/Management: compression stockings on  Taken 2/18/2024 1200 by Kem Sharma RN  VTE Prevention/Management: compression stockings on  Taken 2/18/2024 0800 by Kem Sharma RN  VTE Prevention/Management: compression stockings on  Intervention: Prevent Infection  Recent Flowsheet Documentation  Taken 2/18/2024 1930 by Kem Sharma RN  Infection Prevention:   cohorting utilized   environmental surveillance performed   equipment surfaces disinfected   hand hygiene promoted  Taken 2/18/2024 1600 by Kem Sharma RN  Infection Prevention:   cohorting utilized   environmental surveillance performed   equipment surfaces disinfected   hand hygiene promoted  Taken 2/18/2024 1200 by Kem Sharma RN  Infection Prevention:   cohorting utilized   environmental surveillance performed   equipment surfaces disinfected   hand hygiene promoted  Taken 2/18/2024 0800 by Kem Sharma RN  Infection Prevention:   cohorting utilized    environmental surveillance performed   equipment surfaces disinfected   hand hygiene promoted  Goal: Optimal Comfort and Wellbeing  Intervention: Provide Person-Centered Care  Recent Flowsheet Documentation  Taken 2/18/2024 1930 by Kem Sharma RN  Trust Relationship/Rapport:   care explained   choices provided   emotional support provided   questions answered  Taken 2/18/2024 1600 by Kem Sharma RN  Trust Relationship/Rapport:   care explained   choices provided   emotional support provided   questions answered  Taken 2/18/2024 1200 by Kem Sharma RN  Trust Relationship/Rapport: (no family present at this time) other (see comments)  Taken 2/18/2024 0800 by Kem Sharma RN  Trust Relationship/Rapport: (no family present at this time) other (see comments)   Goal Outcome Evaluation:

## 2024-02-19 NOTE — PROGRESS NOTES
sc  Patient Name: Nick Zaldivar   MRN: [id number]   Date of Admission: 2/14/2024    Procedure: Procedure(s):  CORONARY ARTERY BYPASS GRAFT TIMES THREE, LEFT INTERNAL MAMMARY ARTERY HARVEST, BILATERAL LEG ENDOSCOPIC VESSEL PROCUREMENT, EPIAORTIC ULTRASOUND  ANESTHESIA TRANSESOPHAGEAL ECHOCARDIOGRAM    Post Op day #:5    Subjective (Patient focus/Primary Problem for shift): mobility and SOB          Pain Goal 0 Pain Rating  0          Pain Medication/ Regime effective to reduce patient pain Pauline Tylenol.     Objective (Physical assessment):           Rhythm: normal sinus rhythm            Bowel Activity: yes if Yes indicate when: 2/18 x2 per previous RN           Bowel Medications: yes            Incision: healing well          Incentive Spirometry Q 1-2 hour when awake:  yes Volume: 1000          Epicardial Pacing Wires:  no            Patient Activity:           Up to chair for meals: yes          Ambulation with RN x2 (Not including CR): not applicable            Is patient in home clothes:no             Chest Tubes   Pleural: no Draining: not applicable               Suction: not applicable              Mediastinal: no Draining: not applicable               Suction: not applicable   Dressing Change Daily:not applicable If No, why?                      Urinary Catheter: no           Preventative WOC consult (need MD order): no       Assessment (Nursing primary shift focus): Pt reports no pain this shift. VSS on 2L O2 nasal cannula overnight.     Plan (Patient Care Plan/focus): Pt ambulating with Assist of 1. Does report SOB and dizziness with movement. Needs to work on mobility.       Viviana Carmichael RN   2/19/2024   6:13 AM

## 2024-02-19 NOTE — PLAN OF CARE
[facility] - ICU    RN Progress Note:          Pertinent Assessments:      Please refer to flowsheet rows for full assessment     - alert and orientedx4. NSR. Vital signs stable. On nasal cannula at 2L/min. 02 sats >94%. Denies of pain.         Key Events - This Shift:     - Uneventful night.           Barriers to Discharge / Downgrade:     Downgraded to cardiac telemetry and Transferred to P3 room 311.

## 2024-02-19 NOTE — TREATMENT PLAN
"/56 (BP Location: Left arm)   Pulse 73   Temp 98.4  F (36.9  C) (Oral)   Resp 18   Ht 1.778 m (5' 10\")   Wt 107 kg (235 lb 12.8 oz)   SpO2 93%   BMI 33.83 kg/m         Intake/Output Summary (Last 24 hours) at 2/19/2024 1410  Last data filed at 2/19/2024 1300  Gross per 24 hour   Intake 1080 ml   Output 800 ml   Net 280 ml         Allergies   Allergen Reactions    Bee Venom      Other Reaction(s): Edema      RCAT Treatment Plan    Patient Score: 8    Patient Acuity: 4    Clinical Indication for Therapy: s/p CABG    Therapy Ordered: Douneb QID per CV surgery. IS/Aerobika QID     Assessment Summary: Good voicing. Pt speaks in complete sentences. Pt alert and oriented. No accessory muscle use. No nasal flaring. No retractions. O2 2 lpm via NC.  Pt able to use IS and Aerobika on own. Douneb ordered QID by CV surgery. Breath sounds few scattered crackles, no change post tx.    Pt encouraged to be up out of bed for meals. Reviewed pursed lip breathing technique. Reviewed IS/Aerobika use. Pt encouraged ambulate as tolerated.     Pt quit smoking 2 years ago. Pt smoked 2 ppd x 25 years.       Salo Renee, RT   2/19/2024         "

## 2024-02-20 ENCOUNTER — APPOINTMENT (OUTPATIENT)
Dept: OCCUPATIONAL THERAPY | Facility: HOSPITAL | Age: 79
DRG: 236 | End: 2024-02-20
Attending: THORACIC SURGERY (CARDIOTHORACIC VASCULAR SURGERY)
Payer: MEDICARE

## 2024-02-20 LAB
ANION GAP SERPL CALCULATED.3IONS-SCNC: 9 MMOL/L (ref 7–15)
ATRIAL RATE - MUSE: 91 BPM
BUN SERPL-MCNC: 32.5 MG/DL (ref 8–23)
CA-I BLD-MCNC: 4.3 MG/DL (ref 4.4–5.2)
CA-I BLD-MCNC: 4.5 MG/DL (ref 4.4–5.2)
CALCIUM SERPL-MCNC: 8.4 MG/DL (ref 8.8–10.2)
CHLORIDE SERPL-SCNC: 99 MMOL/L (ref 98–107)
CREAT SERPL-MCNC: 1.11 MG/DL (ref 0.67–1.17)
DEPRECATED HCO3 PLAS-SCNC: 30 MMOL/L (ref 22–29)
DIASTOLIC BLOOD PRESSURE - MUSE: NORMAL MMHG
EGFRCR SERPLBLD CKD-EPI 2021: 68 ML/MIN/1.73M2
ERYTHROCYTE [DISTWIDTH] IN BLOOD BY AUTOMATED COUNT: 14 % (ref 10–15)
GLUCOSE BLDC GLUCOMTR-MCNC: 120 MG/DL (ref 70–99)
GLUCOSE BLDC GLUCOMTR-MCNC: 122 MG/DL (ref 70–99)
GLUCOSE BLDC GLUCOMTR-MCNC: 129 MG/DL (ref 70–99)
GLUCOSE BLDC GLUCOMTR-MCNC: 163 MG/DL (ref 70–99)
GLUCOSE SERPL-MCNC: 142 MG/DL (ref 70–99)
HCT VFR BLD AUTO: 28.2 % (ref 40–53)
HGB BLD-MCNC: 9 G/DL (ref 13.3–17.7)
INTERPRETATION ECG - MUSE: NORMAL
MAGNESIUM SERPL-MCNC: 2.3 MG/DL (ref 1.7–2.3)
MCH RBC QN AUTO: 29.8 PG (ref 26.5–33)
MCHC RBC AUTO-ENTMCNC: 31.9 G/DL (ref 31.5–36.5)
MCV RBC AUTO: 93 FL (ref 78–100)
P AXIS - MUSE: 26 DEGREES
PHOSPHATE SERPL-MCNC: 3.6 MG/DL (ref 2.5–4.5)
PLATELET # BLD AUTO: 328 10E3/UL (ref 150–450)
POTASSIUM SERPL-SCNC: 4.1 MMOL/L (ref 3.4–5.3)
POTASSIUM SERPL-SCNC: 4.2 MMOL/L (ref 3.4–5.3)
PR INTERVAL - MUSE: 186 MS
QRS DURATION - MUSE: 94 MS
QT - MUSE: 396 MS
QTC - MUSE: 487 MS
R AXIS - MUSE: 35 DEGREES
RBC # BLD AUTO: 3.02 10E6/UL (ref 4.4–5.9)
SODIUM SERPL-SCNC: 138 MMOL/L (ref 135–145)
SYSTOLIC BLOOD PRESSURE - MUSE: NORMAL MMHG
T AXIS - MUSE: 79 DEGREES
VENTRICULAR RATE- MUSE: 91 BPM
WBC # BLD AUTO: 10.5 10E3/UL (ref 4–11)

## 2024-02-20 PROCEDURE — 999N000156 HC STATISTIC RCP CONSULT EA 30 MIN

## 2024-02-20 PROCEDURE — 999N000157 HC STATISTIC RCP TIME EA 10 MIN

## 2024-02-20 PROCEDURE — 80048 BASIC METABOLIC PNL TOTAL CA: CPT | Performed by: PHYSICIAN ASSISTANT

## 2024-02-20 PROCEDURE — G0463 HOSPITAL OUTPT CLINIC VISIT: HCPCS

## 2024-02-20 PROCEDURE — 250N000009 HC RX 250: Performed by: PHYSICIAN ASSISTANT

## 2024-02-20 PROCEDURE — 210N000001 HC R&B IMCU HEART CARE

## 2024-02-20 PROCEDURE — 93005 ELECTROCARDIOGRAM TRACING: CPT

## 2024-02-20 PROCEDURE — 94640 AIRWAY INHALATION TREATMENT: CPT

## 2024-02-20 PROCEDURE — 94640 AIRWAY INHALATION TREATMENT: CPT | Mod: 76

## 2024-02-20 PROCEDURE — 93005 ELECTROCARDIOGRAM TRACING: CPT | Performed by: PHYSICIAN ASSISTANT

## 2024-02-20 PROCEDURE — 84100 ASSAY OF PHOSPHORUS: CPT | Performed by: THORACIC SURGERY (CARDIOTHORACIC VASCULAR SURGERY)

## 2024-02-20 PROCEDURE — 36415 COLL VENOUS BLD VENIPUNCTURE: CPT | Performed by: PHYSICIAN ASSISTANT

## 2024-02-20 PROCEDURE — 83735 ASSAY OF MAGNESIUM: CPT | Performed by: THORACIC SURGERY (CARDIOTHORACIC VASCULAR SURGERY)

## 2024-02-20 PROCEDURE — 250N000011 HC RX IP 250 OP 636: Performed by: PHYSICIAN ASSISTANT

## 2024-02-20 PROCEDURE — 85027 COMPLETE CBC AUTOMATED: CPT | Performed by: PHYSICIAN ASSISTANT

## 2024-02-20 PROCEDURE — 97110 THERAPEUTIC EXERCISES: CPT | Mod: GO

## 2024-02-20 PROCEDURE — 250N000013 HC RX MED GY IP 250 OP 250 PS 637: Performed by: PHYSICIAN ASSISTANT

## 2024-02-20 PROCEDURE — 36415 COLL VENOUS BLD VENIPUNCTURE: CPT | Performed by: THORACIC SURGERY (CARDIOTHORACIC VASCULAR SURGERY)

## 2024-02-20 PROCEDURE — 97535 SELF CARE MNGMENT TRAINING: CPT | Mod: GO

## 2024-02-20 PROCEDURE — 82330 ASSAY OF CALCIUM: CPT | Performed by: THORACIC SURGERY (CARDIOTHORACIC VASCULAR SURGERY)

## 2024-02-20 PROCEDURE — 82330 ASSAY OF CALCIUM: CPT | Performed by: PHYSICIAN ASSISTANT

## 2024-02-20 PROCEDURE — 94799 UNLISTED PULMONARY SVC/PX: CPT

## 2024-02-20 PROCEDURE — 93010 ELECTROCARDIOGRAM REPORT: CPT | Mod: HIP | Performed by: INTERNAL MEDICINE

## 2024-02-20 PROCEDURE — 80048 BASIC METABOLIC PNL TOTAL CA: CPT | Performed by: THORACIC SURGERY (CARDIOTHORACIC VASCULAR SURGERY)

## 2024-02-20 RX ADMIN — PANTOPRAZOLE SODIUM 40 MG: 40 TABLET, DELAYED RELEASE ORAL at 08:04

## 2024-02-20 RX ADMIN — IPRATROPIUM BROMIDE AND ALBUTEROL SULFATE 3 ML: .5; 3 SOLUTION RESPIRATORY (INHALATION) at 20:35

## 2024-02-20 RX ADMIN — NYSTATIN: 100000 CREAM TOPICAL at 08:04

## 2024-02-20 RX ADMIN — ASPIRIN 81 MG CHEWABLE TABLET 81 MG: 81 TABLET CHEWABLE at 08:03

## 2024-02-20 RX ADMIN — FUROSEMIDE 40 MG: 10 INJECTION, SOLUTION INTRAMUSCULAR; INTRAVENOUS at 08:04

## 2024-02-20 RX ADMIN — ROSUVASTATIN CALCIUM 40 MG: 40 TABLET, COATED ORAL at 20:04

## 2024-02-20 RX ADMIN — HYDRALAZINE HYDROCHLORIDE 10 MG: 20 INJECTION INTRAMUSCULAR; INTRAVENOUS at 04:48

## 2024-02-20 RX ADMIN — IPRATROPIUM BROMIDE AND ALBUTEROL SULFATE 3 ML: .5; 3 SOLUTION RESPIRATORY (INHALATION) at 07:13

## 2024-02-20 RX ADMIN — METOPROLOL TARTRATE 37.5 MG: 25 TABLET, FILM COATED ORAL at 20:03

## 2024-02-20 RX ADMIN — APIXABAN 5 MG: 5 TABLET, FILM COATED ORAL at 08:04

## 2024-02-20 RX ADMIN — NYSTATIN: 100000 CREAM TOPICAL at 20:05

## 2024-02-20 RX ADMIN — AMIODARONE HYDROCHLORIDE 400 MG: 200 TABLET ORAL at 08:04

## 2024-02-20 RX ADMIN — LIDOCAINE 2 PATCH: 4 PATCH TOPICAL at 14:03

## 2024-02-20 RX ADMIN — APIXABAN 5 MG: 5 TABLET, FILM COATED ORAL at 20:04

## 2024-02-20 RX ADMIN — IPRATROPIUM BROMIDE AND ALBUTEROL SULFATE 3 ML: .5; 3 SOLUTION RESPIRATORY (INHALATION) at 12:10

## 2024-02-20 RX ADMIN — IPRATROPIUM BROMIDE AND ALBUTEROL SULFATE 3 ML: .5; 3 SOLUTION RESPIRATORY (INHALATION) at 15:28

## 2024-02-20 RX ADMIN — FUROSEMIDE 40 MG: 10 INJECTION, SOLUTION INTRAMUSCULAR; INTRAVENOUS at 19:02

## 2024-02-20 RX ADMIN — SENNOSIDES AND DOCUSATE SODIUM 1 TABLET: 8.6; 5 TABLET ORAL at 20:03

## 2024-02-20 RX ADMIN — CALCIUM GLUCONATE 1 G: 20 INJECTION, SOLUTION INTRAVENOUS at 06:22

## 2024-02-20 RX ADMIN — METOPROLOL TARTRATE 25 MG: 25 TABLET, FILM COATED ORAL at 08:05

## 2024-02-20 RX ADMIN — AMIODARONE HYDROCHLORIDE 400 MG: 200 TABLET ORAL at 20:04

## 2024-02-20 RX ADMIN — INSULIN ASPART 1 UNITS: 100 INJECTION, SOLUTION INTRAVENOUS; SUBCUTANEOUS at 16:38

## 2024-02-20 RX ADMIN — ACETAMINOPHEN 650 MG: 325 TABLET ORAL at 06:15

## 2024-02-20 ASSESSMENT — ACTIVITIES OF DAILY LIVING (ADL)
ADLS_ACUITY_SCORE: 33
ADLS_ACUITY_SCORE: 32
ADLS_ACUITY_SCORE: 32
ADLS_ACUITY_SCORE: 33
ADLS_ACUITY_SCORE: 32
ADLS_ACUITY_SCORE: 33
ADLS_ACUITY_SCORE: 32
ADLS_ACUITY_SCORE: 32

## 2024-02-20 NOTE — PLAN OF CARE
Problem: Adult Inpatient Plan of Care  Goal: Plan of Care Review  Description: The Plan of Care Review/Shift note should be completed every shift.  The Outcome Evaluation is a brief statement about your assessment that the patient is improving, declining, or no change.  This information will be displayed automatically on your shift  note.  Outcome: Progressing     Problem: Adult Inpatient Plan of Care  Goal: Readiness for Transition of Care  Outcome: Progressing     Problem: Risk for Delirium  Goal: Optimal Coping  Outcome: Progressing  Intervention: Optimize Psychosocial Adjustment to Delirium  Recent Flowsheet Documentation  Taken 2/19/2024 2120 by Ginny Andrade RN  Supportive Measures: active listening utilized  Taken 2/19/2024 1646 by Ginny Andrade RN  Supportive Measures: active listening utilized     Problem: Risk for Delirium  Goal: Improved Attention and Thought Clarity  Intervention: Maximize Cognitive Function  Recent Flowsheet Documentation  Taken 2/19/2024 2120 by Ginny Andrade RN  Reorientation Measures:   calendar in view   clock in view  Taken 2/19/2024 1646 by Ginny Andrade RN  Reorientation Measures:   calendar in view   clock in view     Problem: Cardiovascular Surgery  Goal: Improved Activity Tolerance  Outcome: Progressing  Intervention: Optimize Tolerance for Activity  Recent Flowsheet Documentation  Taken 2/19/2024 2120 by Ginny Andrade RN  Environmental Support: calm environment promoted  Taken 2/19/2024 1646 by Ginny Andrade RN  Environmental Support: calm environment promoted     Problem: Cardiovascular Surgery  Goal: Absence of Bleeding  Outcome: Progressing     Problem: Cardiovascular Surgery  Goal: Effective Bowel Elimination  Outcome: Progressing     Problem: Cardiovascular Surgery  Goal: Effective Cardiac Function  Outcome: Progressing     Problem: Cardiovascular Surgery  Goal: Absence of Infection Signs and Symptoms  Intervention: Prevent or Manage Infection  Recent Flowsheet  Documentation  Taken 2/19/2024 2120 by Ginny Andrade RN  Infection Prevention:   hand hygiene promoted   rest/sleep promoted  Taken 2/19/2024 1646 by Ginny Andrade RN  Infection Prevention:   hand hygiene promoted   rest/sleep promoted     Problem: Cardiovascular Surgery  Goal: Effective Oxygenation and Ventilation  Outcome: Progressing  Intervention: Promote Airway Secretion Clearance  Recent Flowsheet Documentation  Taken 2/19/2024 2120 by Ginny Andrade RN  Level Incentive Spirometer (mL): 1250  Number of Repetitions (IS): 10  Taken 2/19/2024 1646 by Ginny Andrade RN  Level Incentive Spirometer (mL): 1250  Number of Repetitions (IS): 10   Goal Outcome Evaluation:               ..sc  Patient Name: Ncik Zaldivar   MRN: [id number]   Date of Admission: 2/14/2024    Procedure: Procedure(s):  CORONARY ARTERY BYPASS GRAFT TIMES THREE, LEFT INTERNAL MAMMARY ARTERY HARVEST, BILATERAL LEG ENDOSCOPIC VESSEL PROCUREMENT, EPIAORTIC ULTRASOUND  ANESTHESIA TRANSESOPHAGEAL ECHOCARDIOGRAM    Post Op day #:5    Subjective (Patient focus/Primary Problem for shift): shortness of breath with activity          Pain Goal0/10 Pain Rating0/10           Pain Medication/ Regime effective to reduce patient pain n/a    Objective (Physical assessment):           Rhythm: normal sinus rhythm            Bowel Activity: yes if Yes indicate when: 2/19          Bowel Medications: yes            Incision: healing well          Incentive Spirometry Q 1-2 hour when awake:  yes Volume: 1250          Epicardial Pacing Wires:  no            Patient Activity:           Up to chair for meals: yes          Ambulation with RN x2 (Not including CR): yes            Is patient in home clothes:no             Chest Tubes   Pleural: no Draining: no               Suction: no              Mediastinal: no Draining: no               Suction: no   Dressing Change Daily:not applicable If No, why? N/a                     Urinary Catheter: not applicable            Preventative WOC consult (need MD order): no       Assessment (Nursing primary shift focus):     Alert. Oriented x 4. [place].     Denied pain.    CARRILLO noted, but not worst per patient. On O2 on 1 lpm nc to keep O2 sats 92%.lung sounds diminished. Lower extremities with +2 - +3 edema.    Weak and unsteady. Assist of 1 with transfer and activity. Gait belt and walker.        Plan (Patient Care Plan/focus):     Increase actvity with less CARRILLO. Wean down O2.        Ginny Andrade RN   2/19/2024   11:45 PM

## 2024-02-20 NOTE — PROGRESS NOTES
"CVTS Daily Progress Note   POD#6 s/p CAB x 3(Separate reversed saphenous vein grafts to right posterior descending, the obtuse marginal 1 and the left anterior descending diagonal branch coronary arteries)  Attending: Dr. Flanagan  LOS: 6    SUBJECTIVE/INTERVAL EVENTS:    No acute events overnight. Wheezing resolved with nebs.  Maintaining oxygen saturations on 1L NC. Now off this am. Concerned for a \"funny sensation\" in his chest. EKG pending. NSR. Patient progressing well. Normotensive. Up to the chair this am. Pain well controlled. +BM / +flatus. Tolerating diet. UOP adequate. Hgb 9.0. Patient denies new chest pain, shortness of breath, abdominal pain, calf pain, nausea. Patient has no questions today.     OBJECTIVE:  Temp:  [98  F (36.7  C)-98.5  F (36.9  C)] 98  F (36.7  C)  Pulse:  [71-93] 93  Resp:  [18] 18  BP: (105-141)/(56-65) 119/59  SpO2:  [91 %-96 %] 94 %  Vitals:    02/15/24 0525 02/17/24 0655 02/18/24 0615 02/19/24 0245   Weight: 104.1 kg (229 lb 9.6 oz) 109.1 kg (240 lb 9.6 oz) 107.2 kg (236 lb 4.8 oz) 107 kg (235 lb 12.8 oz)    02/20/24 0530   Weight: 106.1 kg (234 lb)       Clinically Significant Risk Factors          # Hypocalcemia: Lowest iCa = 4.2 mg/dL in last 2 days, will monitor and replace as appropriate     # Hypoalbuminemia: Lowest albumin = 3 g/dL at 2/14/2024  1:01 PM, will monitor as appropriate       # Hypertension: Noted on problem list        # Obesity: Estimated body mass index is 33.58 kg/m  as calculated from the following:    Height as of this encounter: 1.778 m (5' 10\").    Weight as of this encounter: 106.1 kg (234 lb).       # History of CABG: noted on surgical history      Current Medications:    Scheduled Meds:   amiodarone  400 mg Oral BID    Followed by    [START ON 2/21/2024] amiodarone  200 mg Oral BID    Followed by    [START ON 2/23/2024] amiodarone  200 mg Oral Daily    apixaban ANTICOAGULANT  5 mg Oral BID    aspirin  81 mg Oral or NG Tube Daily    furosemide  40 mg " Intravenous BID    insulin aspart  1-7 Units Subcutaneous TID AC    insulin aspart  1-5 Units Subcutaneous At Bedtime    ipratropium - albuterol 0.5 mg/2.5 mg/3 mL  3 mL Nebulization 4x daily    lidocaine  1-2 patch Transdermal Q24H    metoprolol tartrate  25 mg Oral BID    nystatin   Topical BID    pantoprazole  40 mg Oral Daily    polyethylene glycol  17 g Oral Daily    rosuvastatin  40 mg Oral QPM    senna-docusate  1 tablet Oral BID     Continuous Infusions:   BETA BLOCKER NOT PRESCRIBED       PRN Meds:.acetaminophen, bisacodyl, calcium gluconate, calcium gluconate, calcium gluconate, glucose **OR** dextrose **OR** glucagon, hydrALAZINE, ipratropium - albuterol 0.5 mg/2.5 mg/3 mL, lactated ringers, magnesium hydroxide, naloxone **OR** naloxone **OR** naloxone **OR** naloxone, ondansetron **OR** ondansetron, oxyCODONE **OR** oxyCODONE, prochlorperazine **OR** prochlorperazine, BETA BLOCKER NOT PRESCRIBED    Cardiographics:    Telemetry monitoring demonstrates NSR with rate at 80's per my personal review.    Imaging:  Results for orders placed or performed during the hospital encounter of 02/14/24   XR Chest Port 1 View    Impression    IMPRESSION: Postsurgical changes from interval coronary artery bypass grafting. Endotracheal tube terminates 4.3 cm above the marcel. Right neck central venous catheter with tip in the upper SVC. Median sternotomy wires, ascending mediastinal drain, and   left chest tube also present.    The lungs are underinflated with probable left basilar atelectasis. No significant pleural effusion or pneumothorax.    Normal cardiomediastinal silhouette.   XR Chest Port 1 View    Impression    IMPRESSION:     Interval extubation. Sternotomy with post-CABG changes. Stable mediastinal and left chest tubes. No pneumothorax. Moderate hypoinflation. Mild elevation right hemidiaphragm. Right basilar atelectasis. Cardiomegaly with mild pulmonary vascular prominence   or congestion. Chest otherwise  unremarkable.                       Labs, personally reviewed.  Hemoglobin   Date Value Ref Range Status   02/20/2024 9.0 (L) 13.3 - 17.7 g/dL Final   02/16/2024 8.9 (L) 13.3 - 17.7 g/dL Final   02/15/2024 9.4 (L) 13.3 - 17.7 g/dL Final     WBC Count   Date Value Ref Range Status   02/20/2024 10.5 4.0 - 11.0 10e3/uL Final   02/16/2024 13.2 (H) 4.0 - 11.0 10e3/uL Final   02/15/2024 14.7 (H) 4.0 - 11.0 10e3/uL Final     Platelet Count   Date Value Ref Range Status   02/20/2024 328 150 - 450 10e3/uL Final   02/18/2024 192 150 - 450 10e3/uL Final   02/16/2024 134 (L) 150 - 450 10e3/uL Final     Creatinine   Date Value Ref Range Status   02/20/2024 1.11 0.67 - 1.17 mg/dL Final   02/18/2024 1.17 0.67 - 1.17 mg/dL Final   02/16/2024 1.41 (H) 0.67 - 1.17 mg/dL Final     Potassium   Date Value Ref Range Status   02/20/2024 4.1 3.4 - 5.3 mmol/L Final   02/20/2024 4.2 3.4 - 5.3 mmol/L Final   02/19/2024 4.4 3.4 - 5.3 mmol/L Final   02/22/2022 4.8 3.5 - 5.0 mmol/L Final   02/04/2022 5.1 (H) 3.5 - 5.0 mmol/L Final   04/05/2021 4.5 3.5 - 5.0 mmol/L Final     Potassium POCT   Date Value Ref Range Status   02/14/2024 4.1 3.4 - 5.3 mmol/L Final   02/14/2024 3.8 3.4 - 5.3 mmol/L Final   02/14/2024 4.8 3.4 - 5.3 mmol/L Final     Magnesium   Date Value Ref Range Status   02/20/2024 2.3 1.7 - 2.3 mg/dL Final   02/19/2024 2.3 1.7 - 2.3 mg/dL Final   02/18/2024 2.5 (H) 1.7 - 2.3 mg/dL Final          I/O:  I/O last 3 completed shifts:  In: 1160 [P.O.:1160]  Out: 1350 [Urine:1350]       Physical Exam:    General: Patient seen up to chair. NAD. Conversant. Pleasant.   HEENT: GERALDO, no sclera icterus, moist mucosa  CV: RRR on monitor. 2+ peripheral pulses in all extremities. Mild edema.   Pulm: Non-labored effort on 1L NC./RA. Incision C/D/I. No Wheezing noted  Abd: Soft, NT, ND  : Voiding  Ext: Mild pedal edema, SCDs in place, warm, distal pulses intact  Neuro: CNs grossly intact.       ASSESSMENT/PLAN:    Nick Zaldivar is a 78 year old  male with a history of CAD and aortic stenosis who is s/p CAB x 3.    Principal Problem:    S/P AVR (aortic valve replacement)        NEURO:   - Scheduled Tylenol/lidocaine patches and PRN Tylenol/oxycodone for pain    CV:   - Pre-op EF 60-65%  - Normotensive  - Pressors off 2/15  - Metoprolol 25 mg PO BID with parameters  - Amiodarone PO taper dosing last dose 400 mg BID, tapering  - ASA 81mg(decreased with Eliquis)  - Rosuvastatin 40 mg daily  - Chest tubes and TPW's removed without complications  - A-fib follow up -scheduled      PULM:   - Extubated POD #0  - Maintaining oxygen saturations on 1L NC/RA  - Encourage pulmonary toilet  - Duo nebs QID and PRN    FEN/GI:  - Continue electrolyte replacement protocol  - Diet: Cardiac, ADAT   - Bowel regimen    RENAL:  - Adequate UOP/hr. Continue to monitor closely.  - Cr 1.11(baseline 0.96)  - Salazar removed POD#2  - Diuresis with Lasix 40 mg IV BID    HEME:  - Acute blood loss anemia post-op.   - Hgb 9.0, no bleeding concerns. Hep SQ, ASA  - Eliquis 5 mg PO BID(post op a-fib)    ID:  - Tori op ppx complete, afebrile . No concerns for infection    ENDO:   - Transition to SSI     PPx:   - DVT: SCDs, SQ heparin TID, ambulation   - GI: Protonix 40mg PO daily    DISPO:   - Transfer to general telemetry unit POD#2  - Barriers to discharge-wean oxygen x 24 hrs, needs staple removed left LE  - ARU 24-48 hrs per Rehab rec, referrals sent. Bed will be available this week. Hopefully tomorrow.    Patient discussed with  Dr Panchito GutiérrezPAWAYNE  [facility] Cardiothoracic Surgery  Vocera or pager [phone]

## 2024-02-20 NOTE — PLAN OF CARE
"  Problem: Adult Inpatient Plan of Care  Goal: Plan of Care Review  Outcome: Progressing  Flowsheets (Taken 2/20/2024 1424)  Plan of Care Reviewed With:   patient   family  Overall Patient Progress: improving  Updated patient and 2 dtrs at bedside. All questions and concerns addressed.    Anticipate TCU at discharge when stable. CM following.    Problem: Comorbidity Management  Goal: Blood Pressure in Desired Range  Outcome: Progressing  Intervention: Maintain Blood Pressure Management  Recent Flowsheet Documentation  Taken 2/20/2024 0759 by Yadira Snider RN  Medication Review/Management: medications reviewed  /51.  Tele: SR.  Patient c/o feeling some palpitations overnight, \"It felt like a clicking, like 8 or 9 times and then it went away.\" ECG this am showed SR, prolonged QT. LILLY Sandoval from CV surgery team aware.   Patient has hx of PAF. On PO Lopressor, Amiodarone and Eliquis.      Problem: Cardiovascular Surgery  Goal: Improved Activity Tolerance  Outcome: Progressing  Intervention: Optimize Tolerance for Activity  Recent Flowsheet Documentation  Taken 2/20/2024 0759 by Yadira Snider RN  Environmental Support:   calm environment promoted   rest periods encouraged  Up to chair x 2 today and ambulated gallo with cardiac rehab x 2 this shift. Doing well with assist x 1. Does well following sternal precautions. Still CARRILLO, but now on RA, off 1L nc since early this am.     Goal: Optimal Coping with Heart Surgery  Outcome: Progressing  Intervention: Support Psychosocial Response to Surgery  Recent Flowsheet Documentation  Taken 2/20/2024 0759 by Yadira Snider RN  Supportive Measures:   active listening utilized   decision-making supported  Patient's dtr, Patricia, reports he has been feeling down at times like he is not improving. Reassurance that he is doing well. Emotional support and positive reinforcement provided to patient.     Goal: Fluid and Electrolyte Balance  Outcome: Progressing  Wt Readings from Last " 3 Encounters:   02/20/24 106.1 kg (234 lb)   02/06/24 105.3 kg (232 lb 3.2 oz)   01/24/24 106.6 kg (235 lb)   Continue with IV Lasix bid.   Still has +2 BLE, more edema around abd area. He c/o feeling abd fullness this am. Had 2 BM yesterday. Declined Miralax and Senna.   Crackles in posterior bases. Faint exp wheezing, better today. Nebs qid.  Strict I&O's.  K 4.2  Mg 2.3  Phos 3.4  Ionizied Ca replaced with IV x 1 this am. Now 4.5.  Cr 1.11  Hgb 9.0.    Goal: Effective Oxygenation and Ventilation  Outcome: Progressing  Intervention: Promote Airway Secretion Clearance  Recent Flowsheet Documentation  Taken 2/20/2024 3738 by Yadira Snider, RN  Administration (IS):   instruction provided, follow-up   proper technique demonstrated   self-administered  Level Incentive Spirometer (mL): 1250  Cough And Deep Breathing: done independently per patient  Number of Repetitions (IS): 10  Patient Tolerance (IS): good  Tolerating wean from 1L nc to RA this am, SpO2 94%. He reports doing his IS and FV q1h.  CO2 this am 30, up from 27.         Goal Outcome Evaluation:      Plan of Care Reviewed With: patient, family    Overall Patient Progress: improvingOverall Patient Progress: improving

## 2024-02-20 NOTE — TREATMENT PLAN
RCAT Treatment Plan    Patient Score: 9  Patient Acuity: 4    Clinical Indication for Therapy: atelectasis    Therapy Ordered: Duoneb QID    Assessment Summary: Patient awake alert and able to speak in full sentences. BS clear before and after. No change with treatment. Will continue with previous order. Will reassess in 3 days or sooner if patient status changes.    Jayde Mills, RT  2/20/2024

## 2024-02-20 NOTE — DISCHARGE SUMMARY
Cardiovascular Surgery Discharge Summary    Primary Care Physician:  Anselmo [facility]  Discharge Provider: Tracy Mejias PA-C  Admission Date: 2/14/2024  Admission Diagnoses: Coronary artery disease due to lipid rich plaque [I25.10, I25.83]  S/P AVR (aortic valve replacement) [Z95.2]  Discharge Date: 2/22/2024  Disposition: ARU   Condition at Discharge: Good  Code Status: Full Code     Principal Diagnosis:   Coronary artery disease s/p Triple vessel coronary artery bypass grafting;   -  Separate reversed saphenous vein grafts to right posterior descending, the obtuse marginal 1 and the left anterior descending diagonal branch coronary arteries.    Discharge Diagnoses:    Active Problems:      Patient Active Problem List   Diagnosis    Coronary artery disease due to lipid rich plaque    Primary hypertension    Pure hypercholesterolemia    Rheumatic aortic stenosis    Obesity    Benign paroxysmal positional vertigo due to bilateral vestibular disorder    Hyperkalemia    Angina pectoris (H24)    CARRILLO (dyspnea on exertion)    Status post coronary angiogram    Other ill-defined heart diseases    S/P CABG (coronary artery bypass graft)     Per dietician note 2/21   Malnutrition Diagnosis: Moderate malnutrition  In Context of:  Acute illness or injury   MALNUTRITION:  % Weight Loss:  None noted  % Intake:  <75% for > 7 days (moderate malnutrition)  Subcutaneous Fat Loss:  None observed  Muscle Loss:  None observed  Fluid Retention:  Mild BLE, trace hands/wrist    Consult/s: Dietary, critical care medicine, cardiology    Surgery: 2/14/24  CAB x 4 with Dr. Flanagan   Median sternotomy   Take down of the left internal mammary artery    Endoscopic greater saphenous vein procurement from the right lower extremity and the left thigh    Epiaortic ultrasound of the ascending aorta    Placement on central cardiopulmonary bypass    Triple vessel coronary artery bypass grafting;   -  Separate reversed saphenous  vein grafts to right posterior descending, the obtuse marginal 1 and the left anterior descending diagonal branch coronary arteries.    Placement of temporary atrial and ventricular pacing wires    FINDINGS AT OPERATION:  He did have preserved left ventricular function with some left ventricular hypertrophy.   His ascending aorta had significant plaque involvement of the anterior wall so that cannulation was done over to the right side and the aortic cross-clamp was placed much more proximally than usual.  The left internal mammary artery was a 2 mm in diameter conduit with excellent flow, it was not used.  The reversed saphenous vein graft measured 4 to 5 mm in diameter and was of good quality.  The right posterior descending coronary artery was a 2.5 in diameter vessel distal to a stent, an excellent vessel for bypass grafting.  The obtuse marginal 1 was a 2 mm in diameter target vessel, an excellent vessel for bypass grafting.  The left anterior descending diagonal branch was a 1.5 mm in diameter target vessel, an excellent vessel for bypass grafting.  The left anterior descending coronary artery could not be located despite the use of ultrasound and extensive dissection of the anterior wall of the heart.       Discharge Medications:      Review of your medicines        START taking        Dose / Directions   acetaminophen 325 MG tablet  Commonly known as: TYLENOL      Dose: 650 mg  Take 2 tablets (650 mg) by mouth every 4 hours as needed for other (For optimal non-opioid multimodal pain management to improve pain control.)  Refills: 0     amiodarone 200 MG tablet  Commonly known as: PACERONE      Dose: 200 mg  Take 1 tablet (200 mg) by mouth daily  Refills: 0     apixaban ANTICOAGULANT 5 MG tablet  Commonly known as: ELIQUIS  Indication: Atrial Fibrillation Not Caused By A Heart Valve Problem      Dose: 5 mg  Take 1 tablet (5 mg) by mouth 2 times daily  Refills: 0     guaiFENesin 600 MG 12 hr tablet  Commonly  known as: MUCINEX      Dose: 600 mg  Take 1 tablet (600 mg) by mouth 2 times daily as needed for congestion  Refills: 0     magnesium hydroxide 400 MG/5ML suspension  Commonly known as: MILK OF MAGNESIA      Dose: 30 mL  Take 30 mLs by mouth daily as needed for constipation (Use if preventive measures (senna-docusate, docusate, and polyethylene glycol) are not effective.)  Refills: 0     nystatin [id number] UNIT/GM external cream  Commonly known as: MYCOSTATIN      Apply topically 2 times daily  Refills: 0     senna-docusate 8.6-50 MG tablet  Commonly known as: SENOKOT-S/PERICOLACE      Dose: 1 tablet  Take 1 tablet by mouth 2 times daily  Refills: 0            CONTINUE these medicines which may have CHANGED, or have new prescriptions. If we are uncertain of the size of tablets/capsules you have at home, strength may be listed as something that might have changed.        Dose / Directions   Metoprolol Tartrate 37.5 MG Tabs  This may have changed:   medication strength  how much to take      Dose: 37.5 mg  Take 37.5 mg by mouth 2 times daily  Refills: 0            CONTINUE these medicines which have NOT CHANGED        Dose / Directions   aspirin 81 MG EC tablet  Commonly known as: ASA      Dose: 81 mg  [ASPIRIN 81 MG EC TABLET] Take 81 mg by mouth daily.  Refills: 0     clotrimazole 1 % external cream  Commonly known as: LOTRIMIN      Apply topically 2 times daily  Refills: 0     furosemide 20 MG tablet  Commonly known as: LASIX  Used for: Rheumatic aortic stenosis      Dose: 40 mg  Take 2 tablets (40 mg) by mouth 2 times daily  Quantity: 60 tablet  Refills: 11     ibuprofen 400 MG tablet  Commonly known as: ADVIL/MOTRIN      Dose: 400 mg  Take 400 mg by mouth once as needed for moderate pain  Refills: 0     multivitamin w/minerals tablet      Dose: 1 tablet  Take 1 tablet by mouth daily  Refills: 0     nitroGLYcerin 0.4 MG sublingual tablet  Commonly known as: NITROSTAT      See Admin Instructions  Refills: 0    "  rosuvastatin 40 MG tablet  Commonly known as: CRESTOR  Used for: Pure hypercholesterolemia      TAKE 1 TABLET BY MOUTH DAILY  Quantity: 90 tablet  Refills: 3            STOP taking      isosorbide mononitrate 30 MG 24 hr tablet  Commonly known as: IMDUR        lisinopril 40 MG tablet  Commonly known as: ZESTRIL                 Discharge Instructions:    Follow up appointment with Primary Care Physician: Clinic, [facility] within 7 days of discharge from ARU  Follow up appointment with Specialist:    Follow with CV Surgery as scheduled.3/12 at 12:00 pm   Follow up with atrial fibrillation clinic as scheduled with Dr Abdul 3/21 at 12:50 pm   Follow-up with cardiology as scheduled with Dr Hope 3/25 at 4:20 pm    Diet: Cardiac    Activity/Restrictions: As tolerated with sternal precautions in mind (see below). No driving for 4 weeks or while on pain medication.     - Shower and wash your incisions daily with soap and water. No tub baths/hot tubs for 4 weeks. An antibacterial soap such as Dial or Safeguard is recommended.    - Check your incisions every day. If you notice any redness, drainage, or anything unusual, please call the surgeons office.    - No driving for 4 weeks after surgery or while on pain medication     - Do not lift anything more than 10 pounds for 8 weeks after surgery. After 8 weeks, advance lifting gradually as tolerated.    - You may have watery drainage from your chest tube site for 2-3 weeks after surgery. Your may cover with a Band-Aid to protect your clothing. Remove the Band-Aid every day and wash the site.    - If you have a leg lesion, you may have swelling for 2-3 months. Elevate your leg any time you are not walking.    - If you feel any \"popping\" or \"clicking\" sensations in your chest, your arms are out too far or you are putting too much weight into arm movements. Do not reach over your head or out to the side to pull something. Do not do any arm exercises or use any " exercise equipment that involves arm movement. If you feel your sternum moving, call the surgeon's office.    - Increase your daily activity as explained by Cardiac Rehab. You are encouraged to enroll in an Outpatient Cardiac Rehab Program.    - No active sports using your upper arms for 3 months. This includes fishing, hunting, bowling, swimming, tennis or golf.    - No physical activity such as cutting the grass, raking, vacuuming, changing sheets on your bed, snow shoveling, or using a  for 3 months.    - Use incentive spirometer 6-8 times per day for 2 weeks.       Hospital Summary:   Nick Zaldivar is a 78 year old male who was admitted to [facility] on 2/14/2024 following a coronary angiogram demonstrating severe multivessel disease. He also has moderate aortic stenosis and it was planned to perform an aortic valve replacement as well.     Patient was deemed a candidate for surgery and was taken to the operating room on 2/14/24 where patient underwent 3 vessel coronary artery bypass and endoscopic vein harvest from the left thigh and right lower extremity. The aortic valve was not preformed due to ascending aorta calcifications. Surgery was uneventful and patient was brought to the ICU post-operatively. He was extubated on POD#0 and weaned from pressors. Patient was awake and alert, afebrile, and with stable vitals. Insulin drip was discontinued and he was transitioned to a sliding scale. He was transferred to general telemetry status on POD#2 where patient has had return of bowel function, is maintaining oxygen saturations on room air, had his chest tubes removed, and has no complaints of chest pain or shortness of breath. On 02/22/24, patient was stable enough to be discharged to ARU.    Of note, patient did have a couple episodes of post op a-fib that resolved after amiodarone administration. He is being discharged on amiodarone, anti-coagulation, and has appropriate follow-up in atrial  "fibrillation clinic.     Patient also has mild bilateral lower extremity edema and is slightly weight up at discharge; due to this, his home Lasix regimen will be restarted with intermittent BMP checks at ARU.         Vital signs:  Temp: 98  F (36.7  C) Temp src: Oral BP: (!) 147/67 Pulse: 82   Resp: 16 SpO2: 98 % O2 Device: Nasal cannula Oxygen Delivery: 2 LPM Height: 177.8 cm (5' 10\") Weight: 106.4 kg (234 lb 9.6 oz)  Estimated body mass index is 33.66 kg/m  as calculated from the following:    Height as of this encounter: 1.778 m (5' 10\").    Weight as of this encounter: 106.4 kg (234 lb 9.6 oz).          Physical Exam:    Pertinent exam findings on day of discharge include:  Gen: Seen up in chair. NAD. Pleasant and conversant.   CV: RRR on monitor. Mild bilateral lower extremity edema.  Pulm: Non-labored breathing on room air.  Abd: Soft, non-tender, non-distended  Neuro: CNs grossly intact  Inc: C/D/I    _______  Tracy Mejias PA-C  Cardiothoracic Surgery  [phone]      "

## 2024-02-20 NOTE — PROGRESS NOTES
"Care Management Follow Up    Length of Stay (days): 6    Expected Discharge Date: 02/21/2024     Concerns to be Addressed:     Care progression  Patient plan of care discussed at interdisciplinary rounds: Yes    Anticipated Discharge Disposition:  Acute Rehab Center     Anticipated Discharge Services:  Acute Rehab Center  Anticipated Discharge DME:  NA    Patient/family educated on Medicare website which has current facility and service quality ratings:  NA  Education Provided on the Discharge Plan:  Yes per team  Patient/Family in Agreement with the Plan:  Yes    Referrals Placed by CM/SW:  Yes  Private pay costs discussed: Transportation costs    Additional Information:  Lori from Cardiothoracic Surgery [phone] said patient maybe ready for discharge tomorrow.    Social Hx: \"CABG 2/14. Lives with spouse and adult kids in a house. Ind with ADLS and IADLS. No home care svcs. No mobility aides. ARC following, able to accept for this week, call Wed for update. Family can transport.\"     RNCM to follow for medical progression, recommendations, and final discharge plan.     Alice Singh, RN     [id number] spoke with Selin at [facility] and she said patient will need to be off IV lasix and IV hydralazine. BP needs to be stable on oral. Not sure if has a bed for tomorrow. Has other patient's on wait list, but patient maybe admit this week. Call Wed for update.    Called to update Lori    Met with patient and his daughters at bedside to discuss the above. [facility].  Discussed out of pocket cost of [facility] medical transportation by wheelchair with patient and or family member. Patient/family member agreed with the plan to have transportation arranged by [facility].    "

## 2024-02-20 NOTE — PLAN OF CARE
Problem: Adult Inpatient Plan of Care  Goal: Optimal Comfort and Wellbeing  Outcome: Progressing     Problem: Cardiovascular Surgery  Goal: Effective Cardiac Function  Outcome: Progressing   Goal Outcome Evaluation:       NSR. CARRILLO. On 1L O2 overnight. Hydralazine given for bp >140, down to 109/56 after. K, Mg, P protocol ran, recheck in AM. Ionized calcium low, 1g calcium gluconate running, recheck at 1130. VSS. Ax1 up to chair this AM.

## 2024-02-21 ENCOUNTER — APPOINTMENT (OUTPATIENT)
Dept: OCCUPATIONAL THERAPY | Facility: HOSPITAL | Age: 79
DRG: 236 | End: 2024-02-21
Attending: THORACIC SURGERY (CARDIOTHORACIC VASCULAR SURGERY)
Payer: MEDICARE

## 2024-02-21 PROBLEM — Z95.1 S/P CABG (CORONARY ARTERY BYPASS GRAFT): Status: ACTIVE | Noted: 2024-02-14

## 2024-02-21 LAB
CA-I BLD-MCNC: 4.3 MG/DL (ref 4.4–5.2)
CA-I BLD-MCNC: 4.4 MG/DL (ref 4.4–5.2)
CA-I BLD-MCNC: 4.5 MG/DL (ref 4.4–5.2)
GLUCOSE BLDC GLUCOMTR-MCNC: 124 MG/DL (ref 70–99)
GLUCOSE BLDC GLUCOMTR-MCNC: 145 MG/DL (ref 70–99)
GLUCOSE BLDC GLUCOMTR-MCNC: 148 MG/DL (ref 70–99)
GLUCOSE BLDC GLUCOMTR-MCNC: 164 MG/DL (ref 70–99)
MAGNESIUM SERPL-MCNC: 2.4 MG/DL (ref 1.7–2.3)
PHOSPHATE SERPL-MCNC: 3.8 MG/DL (ref 2.5–4.5)
PLATELET # BLD AUTO: 337 10E3/UL (ref 150–450)
POTASSIUM SERPL-SCNC: 3.8 MMOL/L (ref 3.4–5.3)

## 2024-02-21 PROCEDURE — 84132 ASSAY OF SERUM POTASSIUM: CPT | Performed by: THORACIC SURGERY (CARDIOTHORACIC VASCULAR SURGERY)

## 2024-02-21 PROCEDURE — 250N000011 HC RX IP 250 OP 636: Performed by: PHYSICIAN ASSISTANT

## 2024-02-21 PROCEDURE — 97110 THERAPEUTIC EXERCISES: CPT | Mod: GO

## 2024-02-21 PROCEDURE — 250N000011 HC RX IP 250 OP 636: Mod: JZ | Performed by: PHYSICIAN ASSISTANT

## 2024-02-21 PROCEDURE — 84100 ASSAY OF PHOSPHORUS: CPT | Performed by: THORACIC SURGERY (CARDIOTHORACIC VASCULAR SURGERY)

## 2024-02-21 PROCEDURE — 210N000001 HC R&B IMCU HEART CARE

## 2024-02-21 PROCEDURE — 83735 ASSAY OF MAGNESIUM: CPT | Performed by: THORACIC SURGERY (CARDIOTHORACIC VASCULAR SURGERY)

## 2024-02-21 PROCEDURE — 250N000009 HC RX 250: Performed by: PHYSICIAN ASSISTANT

## 2024-02-21 PROCEDURE — 94640 AIRWAY INHALATION TREATMENT: CPT | Mod: 76

## 2024-02-21 PROCEDURE — 94640 AIRWAY INHALATION TREATMENT: CPT

## 2024-02-21 PROCEDURE — 250N000013 HC RX MED GY IP 250 OP 250 PS 637: Performed by: PHYSICIAN ASSISTANT

## 2024-02-21 PROCEDURE — 85049 AUTOMATED PLATELET COUNT: CPT | Performed by: PHYSICIAN ASSISTANT

## 2024-02-21 PROCEDURE — 250N000013 HC RX MED GY IP 250 OP 250 PS 637: Performed by: THORACIC SURGERY (CARDIOTHORACIC VASCULAR SURGERY)

## 2024-02-21 PROCEDURE — 97535 SELF CARE MNGMENT TRAINING: CPT | Mod: GO

## 2024-02-21 PROCEDURE — 999N000157 HC STATISTIC RCP TIME EA 10 MIN

## 2024-02-21 PROCEDURE — 82330 ASSAY OF CALCIUM: CPT | Performed by: THORACIC SURGERY (CARDIOTHORACIC VASCULAR SURGERY)

## 2024-02-21 PROCEDURE — 36415 COLL VENOUS BLD VENIPUNCTURE: CPT | Performed by: THORACIC SURGERY (CARDIOTHORACIC VASCULAR SURGERY)

## 2024-02-21 RX ORDER — TRAMADOL HYDROCHLORIDE 50 MG/1
50 TABLET ORAL EVERY 6 HOURS PRN
Status: DISCONTINUED | OUTPATIENT
Start: 2024-02-21 | End: 2024-02-22 | Stop reason: HOSPADM

## 2024-02-21 RX ORDER — FUROSEMIDE 10 MG/ML
40 INJECTION INTRAMUSCULAR; INTRAVENOUS 2 TIMES DAILY
Status: DISCONTINUED | OUTPATIENT
Start: 2024-02-21 | End: 2024-02-21

## 2024-02-21 RX ORDER — FUROSEMIDE 20 MG
40 TABLET ORAL
Status: DISCONTINUED | OUTPATIENT
Start: 2024-02-21 | End: 2024-02-22 | Stop reason: HOSPADM

## 2024-02-21 RX ORDER — GUAIFENESIN 600 MG/1
600 TABLET, EXTENDED RELEASE ORAL 2 TIMES DAILY
Status: DISCONTINUED | OUTPATIENT
Start: 2024-02-21 | End: 2024-02-22 | Stop reason: HOSPADM

## 2024-02-21 RX ORDER — POTASSIUM CHLORIDE 1500 MG/1
20 TABLET, EXTENDED RELEASE ORAL ONCE
Qty: 1 TABLET | Refills: 0 | Status: COMPLETED | OUTPATIENT
Start: 2024-02-21 | End: 2024-02-21

## 2024-02-21 RX ADMIN — AMIODARONE HYDROCHLORIDE 200 MG: 200 TABLET ORAL at 08:39

## 2024-02-21 RX ADMIN — METOPROLOL TARTRATE 12.5 MG: 25 TABLET, FILM COATED ORAL at 16:53

## 2024-02-21 RX ADMIN — FUROSEMIDE 40 MG: 20 TABLET ORAL at 13:26

## 2024-02-21 RX ADMIN — IPRATROPIUM BROMIDE AND ALBUTEROL SULFATE 3 ML: .5; 3 SOLUTION RESPIRATORY (INHALATION) at 11:53

## 2024-02-21 RX ADMIN — PANTOPRAZOLE SODIUM 40 MG: 40 TABLET, DELAYED RELEASE ORAL at 08:38

## 2024-02-21 RX ADMIN — POTASSIUM CHLORIDE 20 MEQ: 1500 TABLET, EXTENDED RELEASE ORAL at 08:38

## 2024-02-21 RX ADMIN — POLYETHYLENE GLYCOL 3350 17 G: 17 POWDER, FOR SOLUTION ORAL at 08:36

## 2024-02-21 RX ADMIN — CALCIUM GLUCONATE 1 G: 20 INJECTION, SOLUTION INTRAVENOUS at 12:05

## 2024-02-21 RX ADMIN — CALCIUM GLUCONATE 1 G: 20 INJECTION, SOLUTION INTRAVENOUS at 06:03

## 2024-02-21 RX ADMIN — SENNOSIDES AND DOCUSATE SODIUM 1 TABLET: 8.6; 5 TABLET ORAL at 08:39

## 2024-02-21 RX ADMIN — GUAIFENESIN 600 MG: 600 TABLET ORAL at 11:36

## 2024-02-21 RX ADMIN — AMIODARONE HYDROCHLORIDE 200 MG: 200 TABLET ORAL at 20:04

## 2024-02-21 RX ADMIN — APIXABAN 5 MG: 5 TABLET, FILM COATED ORAL at 08:38

## 2024-02-21 RX ADMIN — LIDOCAINE 1 PATCH: 4 PATCH TOPICAL at 13:27

## 2024-02-21 RX ADMIN — AMIODARONE HYDROCHLORIDE 150 MG: 1.5 INJECTION, SOLUTION INTRAVENOUS at 13:19

## 2024-02-21 RX ADMIN — APIXABAN 5 MG: 5 TABLET, FILM COATED ORAL at 20:03

## 2024-02-21 RX ADMIN — FUROSEMIDE 40 MG: 10 INJECTION, SOLUTION INTRAMUSCULAR; INTRAVENOUS at 08:40

## 2024-02-21 RX ADMIN — NYSTATIN 1 APPLICATOR: 100000 CREAM TOPICAL at 21:02

## 2024-02-21 RX ADMIN — METOPROLOL TARTRATE 37.5 MG: 25 TABLET, FILM COATED ORAL at 08:37

## 2024-02-21 RX ADMIN — IPRATROPIUM BROMIDE AND ALBUTEROL SULFATE 3 ML: .5; 3 SOLUTION RESPIRATORY (INHALATION) at 19:14

## 2024-02-21 RX ADMIN — ASPIRIN 81 MG CHEWABLE TABLET 81 MG: 81 TABLET CHEWABLE at 08:38

## 2024-02-21 RX ADMIN — ROSUVASTATIN CALCIUM 40 MG: 40 TABLET, COATED ORAL at 20:04

## 2024-02-21 RX ADMIN — IPRATROPIUM BROMIDE AND ALBUTEROL SULFATE 3 ML: .5; 3 SOLUTION RESPIRATORY (INHALATION) at 07:09

## 2024-02-21 RX ADMIN — METOPROLOL TARTRATE 12.5 MG: 25 TABLET, FILM COATED ORAL at 13:26

## 2024-02-21 RX ADMIN — METOPROLOL TARTRATE 37.5 MG: 25 TABLET, FILM COATED ORAL at 20:04

## 2024-02-21 RX ADMIN — GUAIFENESIN 600 MG: 600 TABLET ORAL at 20:04

## 2024-02-21 RX ADMIN — INSULIN ASPART 1 UNITS: 100 INJECTION, SOLUTION INTRAVENOUS; SUBCUTANEOUS at 16:53

## 2024-02-21 RX ADMIN — NYSTATIN: 100000 CREAM TOPICAL at 11:37

## 2024-02-21 RX ADMIN — SENNOSIDES AND DOCUSATE SODIUM 1 TABLET: 8.6; 5 TABLET ORAL at 20:03

## 2024-02-21 ASSESSMENT — ACTIVITIES OF DAILY LIVING (ADL)
ADLS_ACUITY_SCORE: 32

## 2024-02-21 NOTE — PROGRESS NOTES
"CVTS Daily Progress Note   POD#7 s/p CAB x 3(Separate reversed saphenous vein grafts to right posterior descending, the obtuse marginal 1 and the left anterior descending diagonal branch coronary arteries)  Attending: Dr. Flanagan  LOS: 7    SUBJECTIVE/INTERVAL EVENTS:    No acute events overnight. Normotensive. NSR. Off O2 this AM. Patient progressing well. Up to the chair this AM and working with therapy. Pain well controlled. +BM. Tolerating diet. UOP adequate. Patient denies new chest pain, shortness of breath, abdominal pain, calf pain, nausea. Patient has no questions today although endorses difficulty clearing phlegm.    OBJECTIVE:  Temp:  [97.4  F (36.3  C)-98.2  F (36.8  C)] 98.2  F (36.8  C)  Pulse:  [] 100  Resp:  [16-20] 18  BP: (109-140)/(51-64) 140/63  SpO2:  [91 %-99 %] 93 %  Vitals:    02/17/24 0655 02/18/24 0615 02/19/24 0245 02/20/24 0530   Weight: 109.1 kg (240 lb 9.6 oz) 107.2 kg (236 lb 4.8 oz) 107 kg (235 lb 12.8 oz) 106.1 kg (234 lb)    02/21/24 0604   Weight: 105.8 kg (233 lb 4.8 oz)       Clinically Significant Risk Factors          # Hypocalcemia: Lowest iCa = 4.3 mg/dL in last 2 days, will monitor and replace as appropriate     # Hypoalbuminemia: Lowest albumin = 3 g/dL at 2/14/2024  1:01 PM, will monitor as appropriate       # Hypertension: Noted on problem list        # Obesity: Estimated body mass index is 33.48 kg/m  as calculated from the following:    Height as of this encounter: 1.778 m (5' 10\").    Weight as of this encounter: 105.8 kg (233 lb 4.8 oz).       # History of CABG: noted on surgical history      Current Medications:    Scheduled Meds:   amiodarone  200 mg Oral BID    Followed by    [START ON 2/23/2024] amiodarone  200 mg Oral Daily    apixaban ANTICOAGULANT  5 mg Oral BID    aspirin  81 mg Oral or NG Tube Daily    furosemide  40 mg Intravenous BID    guaiFENesin  600 mg Oral BID    insulin aspart  1-7 Units Subcutaneous TID AC    insulin aspart  1-5 Units " Subcutaneous At Bedtime    ipratropium - albuterol 0.5 mg/2.5 mg/3 mL  3 mL Nebulization 4x daily    lidocaine  1-2 patch Transdermal Q24H    metoprolol tartrate  37.5 mg Oral BID    nystatin   Topical BID    pantoprazole  40 mg Oral Daily    polyethylene glycol  17 g Oral Daily    rosuvastatin  40 mg Oral QPM    senna-docusate  1 tablet Oral BID     Continuous Infusions:      PRN Meds:.acetaminophen, bisacodyl, calcium gluconate, calcium gluconate, calcium gluconate, glucose **OR** dextrose **OR** glucagon, ipratropium - albuterol 0.5 mg/2.5 mg/3 mL, lactated ringers, magnesium hydroxide, naloxone **OR** naloxone **OR** naloxone **OR** naloxone, ondansetron **OR** ondansetron, prochlorperazine **OR** prochlorperazine, traMADol    Cardiographics:    Telemetry monitoring demonstrates NSR with rate at 80s per my personal review.    Imaging:  Results for orders placed or performed during the hospital encounter of 02/14/24   XR Chest Port 1 View    Impression    IMPRESSION: Postsurgical changes from interval coronary artery bypass grafting. Endotracheal tube terminates 4.3 cm above the marcel. Right neck central venous catheter with tip in the upper SVC. Median sternotomy wires, ascending mediastinal drain, and   left chest tube also present.    The lungs are underinflated with probable left basilar atelectasis. No significant pleural effusion or pneumothorax.    Normal cardiomediastinal silhouette.   XR Chest Port 1 View    Impression    IMPRESSION:     Interval extubation. Sternotomy with post-CABG changes. Stable mediastinal and left chest tubes. No pneumothorax. Moderate hypoinflation. Mild elevation right hemidiaphragm. Right basilar atelectasis. Cardiomegaly with mild pulmonary vascular prominence   or congestion. Chest otherwise unremarkable.                       Labs, personally reviewed.  Hemoglobin   Date Value Ref Range Status   02/20/2024 9.0 (L) 13.3 - 17.7 g/dL Final   02/16/2024 8.9 (L) 13.3 - 17.7 g/dL  Final   02/15/2024 9.4 (L) 13.3 - 17.7 g/dL Final     WBC Count   Date Value Ref Range Status   02/20/2024 10.5 4.0 - 11.0 10e3/uL Final   02/16/2024 13.2 (H) 4.0 - 11.0 10e3/uL Final   02/15/2024 14.7 (H) 4.0 - 11.0 10e3/uL Final     Platelet Count   Date Value Ref Range Status   02/21/2024 337 150 - 450 10e3/uL Final   02/20/2024 328 150 - 450 10e3/uL Final   02/18/2024 192 150 - 450 10e3/uL Final     Creatinine   Date Value Ref Range Status   02/20/2024 1.11 0.67 - 1.17 mg/dL Final   02/18/2024 1.17 0.67 - 1.17 mg/dL Final   02/16/2024 1.41 (H) 0.67 - 1.17 mg/dL Final     Potassium   Date Value Ref Range Status   02/21/2024 3.8 3.4 - 5.3 mmol/L Final   02/20/2024 4.1 3.4 - 5.3 mmol/L Final   02/20/2024 4.2 3.4 - 5.3 mmol/L Final   02/22/2022 4.8 3.5 - 5.0 mmol/L Final   02/04/2022 5.1 (H) 3.5 - 5.0 mmol/L Final   04/05/2021 4.5 3.5 - 5.0 mmol/L Final     Potassium POCT   Date Value Ref Range Status   02/14/2024 4.1 3.4 - 5.3 mmol/L Final   02/14/2024 3.8 3.4 - 5.3 mmol/L Final   02/14/2024 4.8 3.4 - 5.3 mmol/L Final     Magnesium   Date Value Ref Range Status   02/21/2024 2.4 (H) 1.7 - 2.3 mg/dL Final   02/20/2024 2.3 1.7 - 2.3 mg/dL Final   02/19/2024 2.3 1.7 - 2.3 mg/dL Final          I/O:  I/O last 3 completed shifts:  In: 1150 [P.O.:1070; I.V.:80]  Out: 1500 [Urine:1500]       Physical Exam:    General: Patient seen up to chair. NAD. Conversant. Pleasant.   HEENT: GERALDO, no sclera icterus, moist mucosa  CV: RRR on monitor. 2+ peripheral pulses in all extremities. Mild edema.   Pulm: Non-labored effort on RA. Incision C/D/I.   Abd: Soft, NT, ND  Ext: Mild pedal edema, SCDs in place, warm, distal pulses intact  Neuro: CNs grossly intact.       ASSESSMENT/PLAN:    Nick Zaldivar is a 78 year old male with a history of CAD and aortic stenosis who is s/p CABGx 3.      NEURO:   - Scheduled Tylenol/lidocaine patches and PRN Tylenol/Tramadol for pain    CV:   - Pre-op EF 60-65%  - Normotensive  - Metoprolol 37.5mg  PO BID with parameters  - Amiodarone PO taper   - ASA 81mg (decreased with Eliquis)  - Eliquis BID  - Rosuvastatin 40 mg daily  - Chest tubes and TPWs removed   - A-fib follow up in place    PULM:   - Extubated POD #0  - Maintaining oxygen saturations on room air  - Encourage pulmonary toilet  - Duo nebs QID and PRN  - Adding Mucinex    FEN/GI:  - Continue electrolyte replacement protocol  - Diet: Cardiac, ADAT   - Bowel regimen    RENAL:  - Adequate UOP/hr. Continue to monitor closely.  - Diuresis with Lasix 40mg PO BID (changed)    HEME:  - Acute blood loss anemia post-op.   - No bleeding concerns. Eliquis, ASA    ID:  - Tori op ppx complete, afebrile . No concerns for infection    ENDO:   - SSI     PPx:   - DVT: SCDs, Eliquis, ambulation   - GI: Protonix 40mg PO daily    DISPO:   - General telemetry status; medically ready for discharge to ARU as soon as bed available.      Patient discussed with Dr. Flanagan      _______  JOANN ColindresC  Cardiothoracic Surgery  [phone]

## 2024-02-21 NOTE — INTERIM SUMMARY
[facility] Pre-Admission Screen    Referral Source:  [facility] -49  Admit date to referring facility: 2/14/2024    Physical Medicine and Rehab Consult Completed: No    Rehab Diagnosis:    09 Cardiac -  s/p CABGx 3 due to history of CAD and aortic stenosis     Justification for Acute Inpatient Rehabilitation  Nick Zaldivar is a 78 year old male with a history of CAD and aortic stenosis who is s/p CABGx 3. Patient's post op course was complicated by increased respiratory needs initially extubated to 6L O2 and has slowly weaned down to 1-2L via nasal cannula. Patient also with a-fib requiring amiodarone bolus which then converted to sinus rhythm. Patient is now medically stable and ready for transfer to acute inpatient rehabilitation. The patient requires transfer to acute inpatient rehabilitation for intensive therapies not available in a lesser level of care including PT/OT, ongoing medical management at least three days per week, and rehabilitative nursing cares.     At baseline Hai is completely independent with mobility, is a community ambulator. Enjoys walking his dog. Currently he needs Ax1 for limited mobility. Patient requires an intensive inpatient rehab program to address the following acute impairments: impaired activity tolerance, impaired balance, impaired strength, sternal precautions, and impaired weight shifting due to restriction for pushing through UEs. Hai is an excellent ARC candidate based on his motivation/participation, PLOF and family supports.     Current Active Medical Management Needs/Risks for Clinical Complications  The patient requires the high level of rehabilitation physician supervision that accompanies the provision of intensive rehabilitation therapy.  The patient needs the services of the rehabilitation physician to assess the patient medically and functionally and to modify the course of treatment as needed to  maximize the patient's capacity to benefit from the rehabilitation process. The patient requires physician oversight at least 3x/week to manage the following:  Cardiovascular: patient will need ongoing assessment of sternal incision for signs of infection/healing as well as reinforcement of sternal precautions; pt continues on metoprolol BID (dose increased 2/21), amiodarone taper, ASA, eliquis, rosuvastatin. Continue to adjust/taper meds PRN. Patient with episode of atrial fibrillation on 2/21 requiring amiodarone bolus. Patient converted to NSR but will need ongoing assessment of cardiac status.   Pulmonary: Encourage pulmonary toilet, Duo nebs QID and PRN, mucinex added. Continue to monitor O2 with progressive activity. Patient has been trialing room air at times but sats drop into the 80's so continues on 1-2L O2 via nasal cannula as needed. Will continue to wean O2 as able.   Pain: Patient with post op pain and will need ongoing assessment and adjustment of pain medications to ensure optimal participation in therapies. Continue tylenol, lidocaine patches and tramadol. Continue to wean as able.   FEN/GI: Continue electrolyte replacement protocol. Patient at risk for constipation in the setting of reduced mobility and pain med use. Continue bowel regimen.   Nutrition: Moderate malnutrition in the setting of acute illness. Dietician following. Will benefit from ongoing dietician involvement on ARC to ensure adequate intake.   Renal: patient has progressed to diuresis with Lasix 40mg PO BID. Continue to monitor renal function, I&O's, and fluid status.   BMI 33.48 indicates obesity: Increased body habitus places the patient at increased risk for complications and mortality. Patient would benefit from weight management education as appropriate.     Past Medical/Surgical History  Surgery in the past 100 days: Yes  Additional relevant past medical history: CAD, aortic stenosis    Level of Functioning Prior to  Admission:    LIVING ENVIRONMENT  People in Home: spouse, child(jonelle), adult (son and daughter)  Current Living Arrangements: house  Home Accessibility: stairs to enter home, stairs within home  Number of Stairs, Main Entrance: 3  Stair Railings, Main Entrance: railings safe and in good condition  Number of Stairs, Within Home, Primary: greater than 10 stairs  Stair Railings, Within Home, Primary: railings safe and in good condition  Transportation Anticipated: family or friend will provide  Living Environment Comments: supportive living environment.  Lives with wife and adult daughter and son.  Son has had previous strokes and is not able to help with IADLs but is independent.  Full flight of stairs up to bedroom.  Bathroom on that level so once up stairs pt can stay on that level if needed    SELF-CARE  Usual Activity Tolerance: good  Regular Exercise: Yes  Activity/Exercise Type: walking  Equipment Currently Used at Home: none  Activity/Exercise/Self-Care Comment: independent with self cares.  Walks dog 3 x per week at the dog park      Level of Function: GG Scale (Section GG Functional Ability and Goals; CMS's CARDENAS Version 3.0 Manual effective 10.1.2019):  PT Current Function Goals for Rehab   Bed Rolling 4 Supervision or touching assitance 6 Independent   Supine to Sit 3 Partial/moderate assistance 6 Independent   Sit to Stand 4 Supervision or touching assitance 6 Independent   Transfer 4 Supervision or touching assitance 6 Independent   Ambulation 3 Partial/moderate assistance 6 Independent   Stairs 3 Partial/moderate assistance 6 Independent     OT Current Function Goals for Rehab   Feeding 5 Setup or clean-up assistance 6 Independent   Grooming 4 Supervision or touching assitance 6 Independent   Bathing Not completed 6 Independent   Upper Body Dressing Not completed 6 Independent   Lower Body Dressing Not completed 6 Independent   Toileting 4 Supervision or touching assitance 6 Independent   Toilet Transfer 4  Supervision or touching assitance 6 Independent   Tub/Shower Transfer 88 Not attempted due to safety 6 Independent   Cognition Not Assessed Not applicable     SLP Current Function Goals for Rehab   Swallow Not Assessed Not applicable   Communication Not Assessed Not applicable       Current Diet:  0-Thin and 7-Regular; low saturated fat    Summary Statement:  Hai currently has physical therapy and occupational therapy needs. Supine<>sit with Etelvina. Ambulates with on AD and Etelvina, with a walker with CGA. Decreased endurance, rest breaks needed. Stairs with CGA/Etelvina. Toileting SBA. CGA for toilet transfer.     Expected Therapies and Services Required During Inpatient Rehab Admission  Intensity of Therapy: Patient requires intensive therapies not available in a lesser level of care. Patient is motivated, making gains, and can tolerate 3 hours of therapy a day.  Physical Therapy: 90 minutes per day, 6 days a week for 7 days  Occupational Therapy: 90 minutes per day, 6 days a week for 7 days  Speech and Language Therapy: Not indicated at this time.   Rehabilitation Nursing Needs: Patient requires 24 hour Rehab Nursing to manage vitals, medication education (pt with new medications since admission), carryover of new rehab techniques, care coordination, skin integrity, pain management, post-surgical incision care to promote healing and prevent infection, provide safe environment for patient at falls risk, and monitor nutritional intake (needs monitoring of weight, intake as pt has moderate malnutrition).    Precautions/restrictions/special needs:  Precautions: fall precautions and Sternal precautions  Restrictions: Do not lift anything more than 10 pounds for 8 weeks after surgery. After 8 weeks, advance lifting gradually as tolerated   Special Needs: Oxygen    Expected Level of Improvement: Anticipate with intensive therapies, close medical management, and rehabilitative nursing care the patient will improve strength,  balance, tolerance to activity, safety to ensure Mod I with basic mobility and ADL performance to allow return home with family assistance.   Expected Length of time to achieve: 7 days    Anticipated Discharge Needs:  Anticipated Discharge Destination: Home  Anticipated Discharge Support: Family member  24/7 support available : Unknown  Identified caregiver(s):  Spouse  Anticipated Discharge Needs: Outpatient Cardiac/Pulmonary Rehab    Identified challenges/barriers:  None anticipated    Liaison signature/date/time:    Physician statement of review and agreement:  I have reviewed and am in agreement of the need for IRF stay to address above functional and medical needs. In addition to above statements address, Patient requires intensive active and ongoing therapeutic intervention and multiple therapies; Patient requires medical supervision; Expected to actively participate in the intensive rehab program; Sufficiently stable to actively participate; Expectation for measurable improvement in functional capacity or adaption to impairments.    MD signature/date/time:

## 2024-02-21 NOTE — PLAN OF CARE
Goal Outcome Evaluation:         sc  Patient Name: Nick Zaldivar   MRN: [id number]   Date of Admission: 2/14/2024    Procedure: Procedure(s):  CORONARY ARTERY BYPASS GRAFT TIMES THREE, LEFT INTERNAL MAMMARY ARTERY HARVEST, BILATERAL LEG ENDOSCOPIC VESSEL PROCUREMENT, EPIAORTIC ULTRASOUND  ANESTHESIA TRANSESOPHAGEAL ECHOCARDIOGRAM    Post Op day #:7    Subjective (Patient focus/Primary Problem for shift): improve CARRILLO          Pain Goal 0 Pain Rating 0           Pain Medication/ Regime effective to reduce patient pain yes    Objective (Physical assessment):           Rhythm: normal sinus rhythm            Bowel Activity: yes if Yes indicate when: 02/19          Bowel Medications: yes            Incision: healing well          Incentive Spirometry Q 1-2 hour when awake:  yes Volume: 1250          Epicardial Pacing Wires:  not applicable            Patient Activity:           Up to chair for meals: yes          Ambulation with RN x2 (Not including CR): not applicable            Is patient in home clothes:no             Chest Tubes   Pleural: not applicable Draining: not applicable               Suction: not applicable              Mediastinal: not applicable Draining: not applicable               Suction: not applicable   Dressing Change Daily:not applicable If No, why? N/A                     Urinary Catheter: not applicable           Preventative WOC consult (need MD order): not applicable       Assessment (Nursing primary shift focus): A & O. Torres Martinez. Denies pain. VSS. On 1L of O2 while sleeping. RA while awake. Sinus rhythm on tele. Calcium gluconate given, ionized calcium recheck at 1100. K, Mag and phosphorus recheck tomorrow. IS and flutter valve use encouraged.    Plan (Patient Care Plan/focus): Encourage ambulation and activity.      Lily Vance RN   2/21/2024   6:05 AM

## 2024-02-21 NOTE — PROGRESS NOTES
"CLINICAL NUTRITION SERVICES - ASSESSMENT NOTE     Nutrition Prescription    RECOMMENDATIONS FOR MDs/PROVIDERS TO ORDER:  Multivitamin and thiamine d/t poor po/malnutrition    Malnutrition Status:    Moderate in acute illness    Recommendations already ordered by Registered Dietitian (RD):  Change Ensure to 10 am snack    Future/Additional Recommendations:  Monitor intake, weight, labs     REASON FOR ASSESSMENT  Nick Zaldivar is a/an 78 year old male assessed by the dietitian for [facility]    HPI: 78 year old male with CAD, Aortic stenosis, and hypertension. S/p CABG x 3 on 2/14    NUTRITION HISTORY  Met with pt, terrible appetite. Was eating fine PTA, typically eats 3 meals per day. Swallowing issues since surgery, likely r/t intubation. Feels he has been maintaining his weight. Would like some Ensure for a 10 am snack, noted currently ordered for 2 pm, will change time. Encouraged po intake.    CURRENT NUTRITION ORDERS  Diet: Low Saturated Fat/2400 mg Sodium  Intake/Tolerance: % of meals although minimal amounts of food ordered (soup, juice, pudding, sherbet)    LABS  Labs reviewed    MEDICATIONS  Medications reviewed    ANTHROPOMETRICS  Height: 177.8 cm (5' 10\")  Most Recent Weight: 105.8 kg (233 lb 4.8 oz)    IBW: 75.5 kg  BMI: Obesity Grade I BMI 30-34.9  Weight History:   Wt Readings from Last 20 Encounters:   02/21/24 105.8 kg (233 lb 4.8 oz)   02/06/24 105.3 kg (232 lb 3.2 oz)   01/24/24 106.6 kg (235 lb)   01/22/24 105.2 kg (232 lb)   01/11/24 105.2 kg (232 lb)   12/05/23 103.9 kg (229 lb)   09/21/22 97.1 kg (214 lb)   02/10/22 97 kg (213 lb 12.8 oz)   12/08/21 100.6 kg (221 lb 11.2 oz)   01/15/20 102.5 kg (226 lb)     Dosing Weight: 83 kg (adj wt)    ASSESSED NUTRITION NEEDS  Estimated Energy Needs: 5112-6241 kcals/day (25 - 30 kcals/kg)  Justification: Maintenance  Estimated Protein Needs:  grams protein/day (1 - 1.2 grams of pro/kg)  Justification: Post-op  Estimated Fluid Needs: 9536-3191 mL/day " (1 mL/kcal)   Justification: Maintenance    PHYSICAL FINDINGS  See malnutrition section below.  Incisional wounds    MALNUTRITION:  % Weight Loss:  None noted  % Intake:  <75% for > 7 days (moderate malnutrition)  Subcutaneous Fat Loss:  None observed  Muscle Loss:  None observed  Fluid Retention:  Mild BLE, trace hands/wrist    Malnutrition Diagnosis: Moderate malnutrition  In Context of:  Acute illness or injury    NUTRITION DIAGNOSIS  Malnutrition related to recent surgery as evidenced by <75% for > 7 days and mild fluid retention.      INTERVENTIONS  Implementation  Nutrition Education: heart healthy low sodium diet education provided 2/16/24   Continue supplements as ordered, change to 10 am vs 2 pm     Goals  Patient to consume % of nutritionally adequate meals three times per day, or the equivalent with supplements/snacks.     Monitoring/Evaluation  Progress toward goals will be monitored and evaluated per protocol.

## 2024-02-21 NOTE — PLAN OF CARE
"  Problem: Adult Inpatient Plan of Care  Goal: Optimal Comfort and Wellbeing  Outcome: Progressing     Problem: Risk for Delirium  Goal: Improved Attention and Thought Clarity  Outcome: Progressing   Goal Outcome Evaluation:      Plan of Care Reviewed With: patient    Overall Patient Progress: improvingOverall Patient Progress: improving    /64   Pulse 90   Temp 97.6  F (36.4  C) (Oral)   Resp 18   Ht 1.778 m (5' 10\")   Wt 105.8 kg (233 lb 4.8 oz)   SpO2 (!) 89%   BMI 33.48 kg/m       Pt is A&Ox4, SR/sinus dysrhythmia, on RA, and assistx1 with a gait belt and a walker. Mg, K, Ph protocol, recheck in the AM. Ca 4.4, replacement given, recheck 1600. Denies pain. Urinal at the bedside.     Pt converted to a-fib, notified Magalie, Amio bolus ordered and given, rate converted back to sinus dysrhythmia/SR, notified Magalie.     B, 124    Maria Luz Talbert RN        "

## 2024-02-21 NOTE — PROGRESS NOTES
"Care Management Follow Up    Length of Stay (days): 7    Expected Discharge Date: 02/21/2024     Concerns to be Addressed:     Care progression  Patient plan of care discussed at interdisciplinary rounds: Yes    Anticipated Discharge Disposition:  Acute Rehab Center     Anticipated Discharge Services:  Acute Rehab Center  Anticipated Discharge DME:  NA    Patient/family educated on Medicare website which has current facility and service quality ratings:  NA  Education Provided on the Discharge Plan:  Yes per team  Patient/Family in Agreement with the Plan:  Yes    Referrals Placed by CM/SW:  Yes  Private pay costs discussed: Transportation costs    Additional Information:  [facility] [phone] called.  CM explained [facility] admission requirements:   Patient needs to be off IV lasix and IV hydralazine. BP needs to be stable on orals.   Patient is on the wait list for [facility], may have a bed available this week.  [facility] is discontinuing the above IV meds.    Per [facility] liaison:   Patient may be moving too well for insurance to cover [facility].  Recommend OT to trial stairs, walk without an AD and complete a full ADL assessment.      CM called OT, OT will call CM back.    CM spoke with OT, OT report the stairs were really difficult and tiring for patient, rated 8/10 for difficultly.  OT concerned about patient going home, continues to recommend Acute Rehab.   If [facility] declines, will need to check in with family to make sure it is safe for patient to return home. At this point patient would not be able to get to OP cardiac rehab, patient would need to get all the home care he can for rehab.  CM reached out ot [facility] liaison, liaison will review OT note.    Current Living Environment per initial OT assessment 2/15:   \"supportive living environment.  Lives with wife and adult daughter and son.  Son has had previous strokes and is not able to help with IADLs but is independent.  Full flight of stairs up to bedroom.  " "Bathroom on that level so once up stairs pt can stay on that level if needed\"    UPDATE:  [facility] liaison reviewed, patient looks appropriate.  [facility] requests tentative ride time for around 2pm tomorrow (pending planned discharges).     CM scheduled [facility] wheelchair transport for 2/22 at [phone]    Patient received IV amiodarone at  1319, [facility] asking if this is something provider anticipates patient will need again.  CM called provider, provider states this is a one time dose, and does not anticipate patient will need more IV cardiac medications.  CM informed [facility].    Social Hx: \"CABG 2/14. Lives with spouse and adult kids in a house. Ind with ADLS and IADLS. No home care svcs. No mobility aides.\"     Transport Per previous CM:  Patient's daughter requested [facility] transport at time of discharge.    Family agreed to out of pocket cost of [facility] medical transportation by wheelchair.    RNCM to follow for medical progression, recommendations, and final discharge plan.     Nany Mcmillan RN    "

## 2024-02-21 NOTE — PROGRESS NOTES
sc  Patient Name: Nick Zaldivar   MRN: [id number]   Date of Admission: 2/14/2024    Procedure: Procedure(s):  CORONARY ARTERY BYPASS GRAFT TIMES THREE, LEFT INTERNAL MAMMARY ARTERY HARVEST, BILATERAL LEG ENDOSCOPIC VESSEL PROCUREMENT, EPIAORTIC ULTRASOUND  ANESTHESIA TRANSESOPHAGEAL ECHOCARDIOGRAM    Post Op day #: 6    Subjective (Patient focus/Primary Problem for shift): Improve activity tolerance          Pain Goal 0 Pain Rating 0           Pain Medication/ Regime effective to reduce patient pain N/A    Objective (Physical assessment):           Rhythm: normal sinus rhythm            Bowel Activity: yes if Yes indicate when: 2/19          Bowel Medications: yes            Incision: healing well          Incentive Spirometry Q 1-2 hour when awake:  yes Volume: 1250          Epicardial Pacing Wires:  no            Patient Activity:           Up to chair for meals: yes          Ambulation with RN x2 (Not including CR): yes            Is patient in home clothes:no             Chest Tubes   Pleural: no Draining: no               Suction: no              Mediastinal: no Draining: no               Suction: no   Dressing Change Daily:not applicable If No, why? N/A                     Urinary Catheter: not applicable           Preventative WOC consult (need MD order): no       Assessment (Nursing primary shift focus):   Pt A/O x 4, endorses numbness of LE bilaterally - no other neuro deficits noted. Vitally stable & continues saturating well on RA when alert - desaturated when asleep & required 1-2LPM to maintain sats > 90%, lungs with expiratory wheezing & fine crackles to lower lobes bilaterally - pt denies SOB at rest but endorses CARRILLO. Initially in sinus tachycardia with rate in the low 100s - resolved to NSR following scheduled metoprolol. +1/+2 edema present to bilateral LE. Denies pain, palpitations, nausea, or vomiting. K, Mag, & phos for recheck tomorrow AM.     Plan (Patient Care Plan/focus): Improve edema &  dyspnea on exertion      Rodrigo Santiago RN   2/20/2024   10:21 PM

## 2024-02-22 ENCOUNTER — APPOINTMENT (OUTPATIENT)
Dept: OCCUPATIONAL THERAPY | Facility: HOSPITAL | Age: 79
DRG: 236 | End: 2024-02-22
Attending: THORACIC SURGERY (CARDIOTHORACIC VASCULAR SURGERY)
Payer: MEDICARE

## 2024-02-22 ENCOUNTER — APPOINTMENT (OUTPATIENT)
Dept: RADIOLOGY | Facility: HOSPITAL | Age: 79
DRG: 236 | End: 2024-02-22
Attending: PHYSICIAN ASSISTANT
Payer: MEDICARE

## 2024-02-22 ENCOUNTER — HOSPITAL ENCOUNTER (INPATIENT)
Facility: CLINIC | Age: 79
LOS: 9 days | Discharge: HOME HEALTH | DRG: 949 | End: 2024-03-02
Attending: PHYSICAL MEDICINE & REHABILITATION | Admitting: PHYSICAL MEDICINE & REHABILITATION
Payer: MEDICARE

## 2024-02-22 VITALS
WEIGHT: 234.6 LBS | DIASTOLIC BLOOD PRESSURE: 59 MMHG | BODY MASS INDEX: 33.58 KG/M2 | OXYGEN SATURATION: 90 % | TEMPERATURE: 97.6 F | SYSTOLIC BLOOD PRESSURE: 101 MMHG | RESPIRATION RATE: 18 BRPM | HEART RATE: 82 BPM | HEIGHT: 70 IN

## 2024-02-22 DIAGNOSIS — G47.34 NOCTURNAL HYPOXIA: Primary | ICD-10-CM

## 2024-02-22 DIAGNOSIS — E78.00 PURE HYPERCHOLESTEROLEMIA: ICD-10-CM

## 2024-02-22 DIAGNOSIS — R06.09 DOE (DYSPNEA ON EXERTION): ICD-10-CM

## 2024-02-22 DIAGNOSIS — Z98.890 STATUS POST CORONARY ANGIOGRAM: ICD-10-CM

## 2024-02-22 DIAGNOSIS — Z95.1 S/P CABG (CORONARY ARTERY BYPASS GRAFT): ICD-10-CM

## 2024-02-22 DIAGNOSIS — I51.89 OTHER ILL-DEFINED HEART DISEASES: ICD-10-CM

## 2024-02-22 DIAGNOSIS — I06.0 RHEUMATIC AORTIC STENOSIS: ICD-10-CM

## 2024-02-22 LAB
GLUCOSE BLDC GLUCOMTR-MCNC: 121 MG/DL (ref 70–99)
GLUCOSE BLDC GLUCOMTR-MCNC: 143 MG/DL (ref 70–99)
MAGNESIUM SERPL-MCNC: 2.6 MG/DL (ref 1.7–2.3)
PHOSPHATE SERPL-MCNC: 4 MG/DL (ref 2.5–4.5)
POTASSIUM SERPL-SCNC: 4.7 MMOL/L (ref 3.4–5.3)

## 2024-02-22 PROCEDURE — 84100 ASSAY OF PHOSPHORUS: CPT | Performed by: THORACIC SURGERY (CARDIOTHORACIC VASCULAR SURGERY)

## 2024-02-22 PROCEDURE — 250N000013 HC RX MED GY IP 250 OP 250 PS 637: Performed by: PHYSICIAN ASSISTANT

## 2024-02-22 PROCEDURE — 83735 ASSAY OF MAGNESIUM: CPT | Performed by: THORACIC SURGERY (CARDIOTHORACIC VASCULAR SURGERY)

## 2024-02-22 PROCEDURE — 36415 COLL VENOUS BLD VENIPUNCTURE: CPT | Performed by: THORACIC SURGERY (CARDIOTHORACIC VASCULAR SURGERY)

## 2024-02-22 PROCEDURE — 97110 THERAPEUTIC EXERCISES: CPT | Mod: GO

## 2024-02-22 PROCEDURE — 97535 SELF CARE MNGMENT TRAINING: CPT | Mod: GO

## 2024-02-22 PROCEDURE — 94640 AIRWAY INHALATION TREATMENT: CPT | Mod: 76

## 2024-02-22 PROCEDURE — 128N000003 HC R&B REHAB

## 2024-02-22 PROCEDURE — 84132 ASSAY OF SERUM POTASSIUM: CPT | Performed by: THORACIC SURGERY (CARDIOTHORACIC VASCULAR SURGERY)

## 2024-02-22 PROCEDURE — 99222 1ST HOSP IP/OBS MODERATE 55: CPT | Mod: AI | Performed by: PHYSICAL MEDICINE & REHABILITATION

## 2024-02-22 PROCEDURE — 94640 AIRWAY INHALATION TREATMENT: CPT

## 2024-02-22 PROCEDURE — 250N000009 HC RX 250: Performed by: PHYSICIAN ASSISTANT

## 2024-02-22 PROCEDURE — 71046 X-RAY EXAM CHEST 2 VIEWS: CPT

## 2024-02-22 RX ORDER — METOPROLOL TARTRATE 37.5 MG/1
37.5 TABLET, FILM COATED ORAL 2 TIMES DAILY
Status: DISCONTINUED | OUTPATIENT
Start: 2024-02-22 | End: 2024-02-22

## 2024-02-22 RX ORDER — GUAIFENESIN 600 MG/1
600 TABLET, EXTENDED RELEASE ORAL 2 TIMES DAILY PRN
Status: DISCONTINUED | OUTPATIENT
Start: 2024-02-22 | End: 2024-03-02 | Stop reason: HOSPADM

## 2024-02-22 RX ORDER — AMIODARONE HYDROCHLORIDE 200 MG/1
200 TABLET ORAL DAILY
Status: ON HOLD | DISCHARGE
Start: 2024-02-22 | End: 2024-03-01

## 2024-02-22 RX ORDER — AMIODARONE HYDROCHLORIDE 200 MG/1
200 TABLET ORAL DAILY
Status: DISCONTINUED | OUTPATIENT
Start: 2024-02-23 | End: 2024-03-02 | Stop reason: HOSPADM

## 2024-02-22 RX ORDER — NYSTATIN 100000 U/G
CREAM TOPICAL 2 TIMES DAILY
Status: DISCONTINUED | OUTPATIENT
Start: 2024-02-22 | End: 2024-03-02 | Stop reason: HOSPADM

## 2024-02-22 RX ORDER — BISACODYL 10 MG
10 SUPPOSITORY, RECTAL RECTAL DAILY PRN
Status: DISCONTINUED | OUTPATIENT
Start: 2024-02-22 | End: 2024-03-02 | Stop reason: HOSPADM

## 2024-02-22 RX ORDER — PANTOPRAZOLE SODIUM 40 MG/1
40 TABLET, DELAYED RELEASE ORAL
Status: DISCONTINUED | OUTPATIENT
Start: 2024-02-23 | End: 2024-03-02 | Stop reason: HOSPADM

## 2024-02-22 RX ORDER — ROSUVASTATIN CALCIUM 20 MG/1
40 TABLET, COATED ORAL AT BEDTIME
Status: DISCONTINUED | OUTPATIENT
Start: 2024-02-22 | End: 2024-03-02 | Stop reason: HOSPADM

## 2024-02-22 RX ORDER — GUAIFENESIN 600 MG/1
600 TABLET, EXTENDED RELEASE ORAL 2 TIMES DAILY PRN
DISCHARGE
Start: 2024-02-22 | End: 2024-03-21

## 2024-02-22 RX ORDER — NITROGLYCERIN 0.4 MG/1
0.4 TABLET SUBLINGUAL EVERY 5 MIN PRN
Status: DISCONTINUED | OUTPATIENT
Start: 2024-02-22 | End: 2024-03-02 | Stop reason: HOSPADM

## 2024-02-22 RX ORDER — MULTIPLE VITAMINS W/ MINERALS TAB 9MG-400MCG
1 TAB ORAL DAILY
Status: DISCONTINUED | OUTPATIENT
Start: 2024-02-23 | End: 2024-03-02 | Stop reason: HOSPADM

## 2024-02-22 RX ORDER — ASPIRIN 81 MG/1
81 TABLET ORAL DAILY
Status: DISCONTINUED | OUTPATIENT
Start: 2024-02-23 | End: 2024-03-02 | Stop reason: HOSPADM

## 2024-02-22 RX ORDER — METOPROLOL TARTRATE 37.5 MG/1
37.5 TABLET, FILM COATED ORAL 2 TIMES DAILY
Status: ON HOLD | DISCHARGE
Start: 2024-02-22 | End: 2024-03-01

## 2024-02-22 RX ORDER — AMOXICILLIN 250 MG
1 CAPSULE ORAL 2 TIMES DAILY
Status: DISCONTINUED | OUTPATIENT
Start: 2024-02-22 | End: 2024-03-02 | Stop reason: HOSPADM

## 2024-02-22 RX ORDER — IPRATROPIUM BROMIDE AND ALBUTEROL SULFATE 2.5; .5 MG/3ML; MG/3ML
3 SOLUTION RESPIRATORY (INHALATION) EVERY 4 HOURS PRN
Status: DISCONTINUED | OUTPATIENT
Start: 2024-02-22 | End: 2024-03-02 | Stop reason: HOSPADM

## 2024-02-22 RX ORDER — AMOXICILLIN 250 MG
1 CAPSULE ORAL AT BEDTIME
Status: DISCONTINUED | OUTPATIENT
Start: 2024-02-22 | End: 2024-02-22

## 2024-02-22 RX ORDER — POLYETHYLENE GLYCOL 3350 17 G/17G
17 POWDER, FOR SOLUTION ORAL DAILY PRN
Status: DISCONTINUED | OUTPATIENT
Start: 2024-02-22 | End: 2024-03-02 | Stop reason: HOSPADM

## 2024-02-22 RX ORDER — NYSTATIN 100000 U/G
CREAM TOPICAL 2 TIMES DAILY
Status: ON HOLD | DISCHARGE
Start: 2024-02-22 | End: 2024-03-01

## 2024-02-22 RX ORDER — ACETAMINOPHEN 325 MG/1
650 TABLET ORAL EVERY 4 HOURS PRN
Status: DISCONTINUED | OUTPATIENT
Start: 2024-02-22 | End: 2024-03-02 | Stop reason: HOSPADM

## 2024-02-22 RX ORDER — AMOXICILLIN 250 MG
1 CAPSULE ORAL 2 TIMES DAILY
Status: ON HOLD | DISCHARGE
Start: 2024-02-22 | End: 2024-03-01

## 2024-02-22 RX ORDER — FUROSEMIDE 40 MG
40 TABLET ORAL 2 TIMES DAILY
Status: DISCONTINUED | OUTPATIENT
Start: 2024-02-23 | End: 2024-03-02 | Stop reason: HOSPADM

## 2024-02-22 RX ORDER — ACETAMINOPHEN 325 MG/1
650 TABLET ORAL EVERY 4 HOURS PRN
DISCHARGE
Start: 2024-02-22

## 2024-02-22 RX ADMIN — FUROSEMIDE 40 MG: 20 TABLET ORAL at 13:51

## 2024-02-22 RX ADMIN — ASPIRIN 81 MG CHEWABLE TABLET 81 MG: 81 TABLET CHEWABLE at 08:29

## 2024-02-22 RX ADMIN — ROSUVASTATIN CALCIUM 40 MG: 20 TABLET, FILM COATED ORAL at 20:08

## 2024-02-22 RX ADMIN — IPRATROPIUM BROMIDE AND ALBUTEROL SULFATE 3 ML: .5; 3 SOLUTION RESPIRATORY (INHALATION) at 08:20

## 2024-02-22 RX ADMIN — NYSTATIN: 100000 CREAM TOPICAL at 20:10

## 2024-02-22 RX ADMIN — APIXABAN 5 MG: 5 TABLET, FILM COATED ORAL at 20:11

## 2024-02-22 RX ADMIN — PANTOPRAZOLE SODIUM 40 MG: 40 TABLET, DELAYED RELEASE ORAL at 08:31

## 2024-02-22 RX ADMIN — ACETAMINOPHEN 650 MG: 325 TABLET ORAL at 03:43

## 2024-02-22 RX ADMIN — SENNOSIDES AND DOCUSATE SODIUM 1 TABLET: 8.6; 5 TABLET ORAL at 08:31

## 2024-02-22 RX ADMIN — METOPROLOL TARTRATE 12.5 MG: 25 TABLET, FILM COATED ORAL at 13:51

## 2024-02-22 RX ADMIN — INSULIN ASPART 1 UNITS: 100 INJECTION, SOLUTION INTRAVENOUS; SUBCUTANEOUS at 12:56

## 2024-02-22 RX ADMIN — AMIODARONE HYDROCHLORIDE 200 MG: 200 TABLET ORAL at 08:31

## 2024-02-22 RX ADMIN — Medication 37.5 MG: at 20:08

## 2024-02-22 RX ADMIN — METOPROLOL TARTRATE 37.5 MG: 25 TABLET, FILM COATED ORAL at 08:29

## 2024-02-22 RX ADMIN — NYSTATIN: 100000 CREAM TOPICAL at 08:35

## 2024-02-22 RX ADMIN — GUAIFENESIN 600 MG: 600 TABLET ORAL at 08:32

## 2024-02-22 RX ADMIN — APIXABAN 5 MG: 5 TABLET, FILM COATED ORAL at 08:32

## 2024-02-22 RX ADMIN — POLYETHYLENE GLYCOL 3350 17 G: 17 POWDER, FOR SOLUTION ORAL at 08:33

## 2024-02-22 RX ADMIN — LIDOCAINE 1 PATCH: 4 PATCH TOPICAL at 13:51

## 2024-02-22 RX ADMIN — IPRATROPIUM BROMIDE AND ALBUTEROL SULFATE 3 ML: .5; 3 SOLUTION RESPIRATORY (INHALATION) at 12:03

## 2024-02-22 RX ADMIN — SENNOSIDES AND DOCUSATE SODIUM 1 TABLET: 8.6; 5 TABLET ORAL at 20:08

## 2024-02-22 RX ADMIN — FUROSEMIDE 40 MG: 20 TABLET ORAL at 08:29

## 2024-02-22 ASSESSMENT — ACTIVITIES OF DAILY LIVING (ADL)
ADLS_ACUITY_SCORE: 32
ADLS_ACUITY_SCORE: 31
DESCRIBE_HEARING_LOSS: BILATERAL HEARING LOSS
ADLS_ACUITY_SCORE: 30
ADLS_ACUITY_SCORE: 23
FALL_HISTORY_WITHIN_LAST_SIX_MONTHS: NO
ADLS_ACUITY_SCORE: 31
ADLS_ACUITY_SCORE: 36
ADLS_ACUITY_SCORE: 32
ADLS_ACUITY_SCORE: 32
ADLS_ACUITY_SCORE: 31
ADLS_ACUITY_SCORE: 30
ADLS_ACUITY_SCORE: 31
ADLS_ACUITY_SCORE: 32
ADLS_ACUITY_SCORE: 30
HEARING_DIFFICULTY_OR_DEAF: YES
CHANGE_IN_FUNCTIONAL_STATUS_SINCE_ONSET_OF_CURRENT_ILLNESS/INJURY: YES
THE_FOLLOWING_AIDS_WERE_PROVIDED;: PATIENT DECLINED OFFER OF AUXILIARY AIDS
DIFFICULTY_EATING/SWALLOWING: NO
ADLS_ACUITY_SCORE: 30
ADLS_ACUITY_SCORE: 31
ADLS_ACUITY_SCORE: 32
PATIENT'S_PREFERRED_MEANS_OF_COMMUNICATION: VERBAL
CONCENTRATING,_REMEMBERING_OR_MAKING_DECISIONS_DIFFICULTY: NO
ADLS_ACUITY_SCORE: 30
WERE_AUXILIARY_AIDS_OFFERED?: NO
TOILETING_ISSUES: NO
ADLS_ACUITY_SCORE: 32
DRESSING/BATHING_DIFFICULTY: NO
ADLS_ACUITY_SCORE: 32
ADLS_ACUITY_SCORE: 23
ADLS_ACUITY_SCORE: 30
WEAR_GLASSES_OR_BLIND: NO
ADLS_ACUITY_SCORE: 32
DIFFICULTY_COMMUNICATING: NO
ADLS_ACUITY_SCORE: 32
WALKING_OR_CLIMBING_STAIRS_DIFFICULTY: NO
DOING_ERRANDS_INDEPENDENTLY_DIFFICULTY: NO

## 2024-02-22 NOTE — DISCHARGE INSTRUCTIONS
Follow up Appointments on Discharge:    PCP  You are scheduled to see Dr. Leigh on March 15 2024 at 10:00am.    Address  [address] [address]   Phone   [phone]    Fax  [phone]    Cardiology  You are scheduled to see Dr. Hope on March 25 2024 at 3:25 pm.    Address  [address]    [facility] [address]  Phone   [phone]    CV Surgery  You are scheduled for CV surgery on March 12 2024  at 11:45 am.    Address  [address]    [facility] [address]  Phone   [phone]         Home Health Care:   [facility] PH: [phone]   Nurse, physical therapy, & occupational therapy  -You will get a call after you have discharged from Acute Rehab Hospital to schedule the first home care visit. The home health nurse should come out within the first 24-48 hours. If you don't get a call from them within the first 48 hours, please call to follow up (number above).     Home Oxygen:   [facility] ([facility])  PH: [phone]   Fax: [phone]

## 2024-02-22 NOTE — H&P
"    [facility]   Acute Rehabilitation Unit  Admission History and Physical    CHIEF COMPLAINT   S/P 3 vessel coronary artery bypass     HISTORY OF PRESENT ILLNESS  Nick Zaldivar is a 78 year old man with past medical history of moderate aortic valve stenosis, CAD with prior PCI, Heart failure with preserved EF, HTN, HLD, obesity, who was admitted 2/14/24 for planned CABG x3, plan for avr but unable due to calcifications.  He was extubated 2/14/24.  Transferred out of ICU 2/16/24.  He had mild intermittent hypoxic respiratory failure felt to be 2/2 atelectasis.  Additionally had post operative A-fib which resolved after amiodarone administration.  He has mild ble edema admitted to aru on home lasix 40 mg bid.      Functionally noted to have impaired strength, impaired activity tolerance.  He is currently completing supine to sit transfer with min assist.  He is ambulating with min assist without assistive device and cga with walker.  He is completing stairs with cga-min assist.  He is toileting with stand by assist and cga for toiet transfer.  Goals for mod I- I with basic mobility and adls.     On arrival to rehab, he was doing well. He is [place] and wanted me to stand on his right side. His wife and daughter came towards the end of interview and were very nice and supportive.   He has had some pain around the incision lately since he started getting himself out of bed and holding/pulling the bedrail (but not at rest). Denied any focal weakness but noted that he is \"winded all the time\" and doesn't have enough energy. 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 Sleep is interrupted by nursing care but not primary issue. No paresthesia in hands and bilateral upper extremities. Swallowing is difficult because his throat is very dry; no changes in his memory and cognition. No problem with urination but he is " constipated and feels bloated.     PAST MEDICAL HISTORY   Reviewed and updated in Epic.  Past Medical History:   Diagnosis Date    Aortic stenosis     Coronary artery disease     Hypertension        SURGICAL HISTORY  Reviewed and updated in Epic.  Past Surgical History:   Procedure Laterality Date    CORONARY ARTERY BYPASS GRAFT, WITH AORTIC VALVE REPLACEMENT, WITH ENDOSCOPIC VESSEL PROCUREMENT N/A 2/14/2024    Procedure: CORONARY ARTERY BYPASS GRAFT TIMES THREE, LEFT INTERNAL MAMMARY ARTERY HARVEST, BILATERAL LEG ENDOSCOPIC VESSEL PROCUREMENT, EPIAORTIC ULTRASOUND;  Surgeon: Carmelina Flanagan MD;  [place] [facility] [place]    CV CORONARY ANGIOGRAM N/A 1/22/2024    Procedure: Coronary Angiogram;  Surgeon: Harman Ring MD;  Location: [facility] CATH LAB CV    CV RIGHT HEART CATH MEASUREMENTS RECORDED N/A 1/22/2024    Procedure: Right Heart Catheterization;  Surgeon: Harman Ring MD;  Location: [facility] CATH LAB CV    TRANSESOPHAGEAL ECHOCARDIOGRAM INTRAOPERATIVE N/A 2/14/2024    Procedure: ANESTHESIA TRANSESOPHAGEAL ECHOCARDIOGRAM;  Surgeon: Carmelina Flanagan MD;  [place] [facility] [place]       SOCIAL HISTORY  Reviewed and updated in Epic.  Marital Status:    Living situation: lives with spouse, adult son & daughter in home with 3 andrea and 14 to go upstairs. Daughter noted that he can do everything on the main floor including sleeping but has to go upstairs for showering.    Family support: his wife has dementia and needs 24/7 care. He was taking care of her. Son also had multiple strokes and doesn't work but doesn't need care; he participates in light house chores. Their daughter is very helpful and takes care of everything in the house.  Tobacco use: former smoker quit >40 years ago  Alcohol use: x 2-3 / year   Illicit drug use: none    Social History     Socioeconomic History    Marital status:      Spouse name: Not on file    Number of children: Not on file    Years of education: Not  on file    Highest education level: Not on file   Occupational History    Not on file   Tobacco Use    Smoking status: Former     Types: Cigarettes     Quit date: 1978     Years since quittin.1    Smokeless tobacco: Never   Vaping Use    Vaping Use: Never used   Substance and Sexual Activity    Alcohol use: Not Currently    Drug use: Never    Sexual activity: Not on file   Other Topics Concern    Not on file   Social History Narrative    Not on file     Social Determinants of Health     Financial Resource Strain: Not on file   Food Insecurity: Not on file   Transportation Needs: Not on file   Physical Activity: Not on file   Stress: Not on file   Social Connections: Not on file   Interpersonal Safety: Not on file   Housing Stability: Not on file       FAMILY HISTORY  Reviewed and updated in Epic.  No family history on file.      PRIOR FUNCTIONAL HISTORY   Pt was independent with all ADLs/IADLs, transfers, mobility and gait.    Right hand-dominant   Drives   He is retired      MEDICATIONS  Scheduled meds  Medications Prior to Admission   Medication Sig Dispense Refill Last Dose    acetaminophen (TYLENOL) 325 MG tablet Take 2 tablets (650 mg) by mouth every 4 hours as needed for other (For optimal non-opioid multimodal pain management to improve pain control.)   2024 at 0343    amiodarone (PACERONE) 200 MG tablet Take 1 tablet (200 mg) by mouth daily   2024 at 0831    apixaban ANTICOAGULANT (ELIQUIS) 5 MG tablet Take 1 tablet (5 mg) by mouth 2 times daily   2024 at 0832    aspirin 81 MG EC tablet [ASPIRIN 81 MG EC TABLET] Take 81 mg by mouth daily.   2024 at 0829    clotrimazole (LOTRIMIN) 1 % external cream Apply topically 2 times daily   Past Month    furosemide (LASIX) 20 MG tablet Take 2 tablets (40 mg) by mouth 2 times daily 60 tablet 11 2024 at 1351    guaiFENesin (MUCINEX) 600 MG 12 hr tablet Take 1 tablet (600 mg) by mouth 2 times daily as needed for  "congestion   2/22/2024 at 0832    ibuprofen (ADVIL/MOTRIN) 400 MG tablet Take 400 mg by mouth once as needed for moderate pain   Past Month    magnesium hydroxide (MILK OF MAGNESIA) 400 MG/5ML suspension Take 30 mLs by mouth daily as needed for constipation (Use if preventive measures (senna-docusate, docusate, and polyethylene glycol) are not effective.)   Unknown    metoprolol tartrate 37.5 MG TABS Take 37.5 mg by mouth 2 times daily   2/22/2024 at 1351    multivitamin w/minerals (THERA-VIT-M) tablet Take 1 tablet by mouth daily   Unknown    nitroGLYcerin (NITROSTAT) 0.4 MG sublingual tablet See Admin Instructions   Unknown    nystatin (MYCOSTATIN) 584531 UNIT/GM external cream Apply topically 2 times daily   2/22/2024 at 0835    rosuvastatin (CRESTOR) 40 MG tablet TAKE 1 TABLET BY MOUTH DAILY 90 tablet 3 2/21/2024 at 2004    senna-docusate (SENOKOT-S/PERICOLACE) 8.6-50 MG tablet Take 1 tablet by mouth 2 times daily   2/22/2024 at 0831       ALLERGIES     Allergies   Allergen Reactions    Bee Venom      Other Reaction(s): Edema         REVIEW OF SYSTEMS  A 10 point ROS was performed and negative unless otherwise noted in HPI.       PHYSICAL EXAM  VITAL SIGNS:  /50 (BP Location: Left arm)   Pulse 74   Temp 98.7  F (37.1  C) (Oral)   Resp 18   Ht 1.778 m (5' 10\")   Wt 107.3 kg (236 lb 8.9 oz)   SpO2 97%   BMI 33.94 kg/m    BMI:  Estimated body mass index is 33.94 kg/m  as calculated from the following:    Height as of this encounter: 1.778 m (5' 10\").    Weight as of this encounter: 107.3 kg (236 lb 8.9 oz).     General: NAD, resting in bed   HEENT: [place], dry MM  Pulmonary: diminished at bases - sternal incision closed and healing - had lidocaine patch on the right side. Chest tube sites with open superficial skin but not deep and no drainage - open to air   Cardiovascular: RRR  Abdominal: bloated, no tenderness to palpation, soft   Extremities: ++ edema in RLE > LLE, hairless skin, no erythema or " tenderness to palpation at calves. He had 2 staples on the right distal leg area   MSK/neuro:   Mental Status:  alert and oriented x3    Cranial Nerves: [place]     Sensory: Normal to light touch in bilateral upper extremities, absent to LT in feet and diminished to LT in bilateral lower extremities up to his knees    Strength: 4/5 at bilateral shoulder abd and HF; EE was also 4+/5 but otherwise 5/5 at bilateral upper extremities. He had 4+/5 strength distally in bilateral lower extremities     Speech: clear and coherent    Cognition: able to remember his history in detail         LABS  CBC RESULTS:   Recent Labs   Lab Test 02/21/24  0427 02/20/24  0731 02/18/24  0430 02/16/24  1139 02/15/24  0411   WBC  --  10.5  --  13.2* 14.7*   RBC  --  3.02*  --  2.96* 3.14*   HGB  --  9.0*  --  8.9* 9.4*   HCT  --  28.2*  --  27.6* 28.5*   MCV  --  93  --  93 91   MCH  --  29.8  --  30.1 29.9   MCHC  --  31.9  --  32.2 33.0   RDW  --  14.0  --  13.2 13.1    328 192 134* 167     Last Basic Metabolic Panel:  Recent Labs   Lab Test 02/22/24  1242 02/22/24  0832 02/22/24  0517 02/21/24  2049 02/21/24  0737 02/21/24  0427 02/20/24  0712 02/20/24  0514 02/18/24  0810 02/18/24  0430 02/16/24  0745 02/16/24  0523   NA  --   --   --   --   --   --   --  138  --  142  --  142   POTASSIUM  --   --  4.7  --   --  3.8  --  4.2  4.1   < > 4.0  3.9   < > 4.4  4.4   CHLORIDE  --   --   --   --   --   --   --  99  --  105  --  110*   CO2  --   --   --   --   --   --   --  30*  --  27  --  26   ANIONGAP  --   --   --   --   --   --   --  9  --  10  --  6*   * 121*  --  148*   < >  --    < > 142*   < > 109*   < > 129*   BUN  --   --   --   --   --   --   --  32.5*  --  40.5*  --  39.4*   CR  --   --   --   --   --   --   --  1.11  --  1.17  --  1.41*   GFRESTIMATED  --   --   --   --   --   --   --  68  --  63  --  51*   BUBBA  --   --   --   --   --   --   --  8.4*  --  8.1*  --  8.5*    < > = values in this interval not displayed.          IMPRESSION/PLAN:  Nick Zaldivar is a 78 year old man with past medical history of HTN, HLD, aortic stenosis, obesity, heart failure with preserved EF and CAD who was admitted for planned CABG & AVR, underwent 3 vessel coronary artery bypass 2/14/24 valve replacement was unable to be performed. Hospital course complicated by hypervolemia, atelectasis, acute blood loss anemia, and A-fib.  Functionally noted to have impaired strength, impaired balance, and impaired activity tolerance, he was admitted to rehab 2/22/24.       Admission to acute inpatient rehab s/p CABG x 3.    Impairment group code: 9      PT, OT 90 minutes of each on a daily basis up to 6 days per week, in addition to rehab nursing and close management of physiatrist.      Impairment of ADL's: Noted to have impaired strength, impaired activity tolerance, and impaired balance leading to decreased ability to independently complete ADL's.  Will benefit from ongoing OT with goal for MOD I with basic ADLs.     Impairment of mobility:  Noted to have impaired strength, impaired activity tolerance, and impaired balance leading to decreased mobility.  Will benefit from ongoing PT with goal for BHARATH with basic mobility.     Medical Conditions  CAD s/p CABG x 3    Completed 2/14/24 per Dr. Flanagan.   -sternal precautions x 8 weeks  -cleanse incision daily  -had 2 staples left on the right distal leg area - asked the nursing team to remove; please monitor site  -asa 81  -continue crestor  -follow up cv surgery    Post operative A-fib  -started on amiodarone tapered down and admitted to aru on 200 mg daily x 21 days  -continue apixaban  -continue metoprolol  -follow up Dr. Michael as scheduled.     HF preserved EF  -continue metoprolol 37.5 mg bid  -continue lasix 40 mg bid (home dose)  -monitor weight and volume status daily     Hypoxic respiratory failure  Atelectasis  -mucinex bid prn  -duo nebs bid prn   -encourage IS  -monitor oxygen level- oxygen  prn to keep sats >90%    Acute blood loss anemia  Hgb 13.9 pre-op down to 8.9 2/16/24 stable 9.0 2/20.  -trend    Pre Diabetes  Hgb A1C 5.9%  -trend with primary care    Polyneuropathy ?   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 This can be due to DM vs PAD vs idiopathic vs combination. Should follow up with PCP for basic screening blood work (SPEP, protein ASHLIE, B12 with refelct MMA, B6, B1 - A1c and TSH were checked recently) and possible NCS/EMG. Needs education on excellent skin/nail monitoring and care to avoid future complications.       Adjustment to disability:  Clinical psychology to eval and treat as indicated  FEN: reg  Bowel: monitor; he is constipated so consider increasing senokot dose and scheduling miralax pending his course   Bladder: monitor  DVT Prophylaxis: apixaban  GI Prophylaxis: PPI  Code: full - confirmed upon admission   Disposition: goal for home  ELOS:  one week - impaired endurance and stairs at home are main barriers. Might need regular rehab and then cardiac rehab due to poor endurance.    Rehab prognosis:  good  Follow up Appointments on Discharge: pcp, cardiology, cv surgery      Note prepared by Nelly Summers MD  Physical Medicine & Rehabilitation

## 2024-02-22 NOTE — PLAN OF CARE
Problem: Cardiovascular Surgery  Goal: Improved Activity Tolerance  Outcome: Progressing  Intervention: Optimize Tolerance for Activity  Recent Flowsheet Documentation  Taken 2/21/2024 1640 by Josee Guerrero RN  Environmental Support:   calm environment promoted   rest periods encouraged  Goal: Optimal Coping with Heart Surgery  Outcome: Progressing  Intervention: Support Psychosocial Response to Surgery  Recent Flowsheet Documentation  Taken 2/21/2024 1640 by Josee Guerrero, RN  Supportive Measures: active listening utilized  Goal: Absence of Bleeding  Outcome: Progressing  Goal: Effective Bowel Elimination  Outcome: Progressing  Intervention: Enhance Bowel Motility and Elimination  Recent Flowsheet Documentation  Taken 2/21/2024 1640 by Josee Guerrero, RN  Bowel Motility Enhancement:   ambulation promoted   fluid intake encouraged  Goal: Effective Cardiac Function  Outcome: Progressing  Goal: Optimal Cerebral Tissue Perfusion  Outcome: Progressing  Intervention: Protect and Optimize Cerebral Perfusion  Recent Flowsheet Documentation  Taken 2/21/2024 1640 by Josee Guerrero RN  Sensory Stimulation Regulation: care clustered  Goal: Fluid and Electrolyte Balance  Outcome: Progressing  Goal: Blood Glucose Level Within Targeted Range  Outcome: Progressing  Goal: Absence of Infection Signs and Symptoms  Outcome: Progressing  Goal: Anesthesia/Sedation Recovery  Intervention: Optimize Anesthesia Recovery  Recent Flowsheet Documentation  Taken 2/21/2024 1640 by Josee Guerrero RN  Safety Promotion/Fall Prevention:   activity supervised   assistive device/personal items within reach   clutter free environment maintained   nonskid shoes/slippers when out of bed   patient and family education   room near nurse's station   room organization consistent   safety round/check completed   supervised activity  Reorientation Measures:   clock in view   calendar in view   familiar social contact encouraged    reorientation provided   hearing device use encouraged  Goal: Acceptable Pain Control  Outcome: Progressing  Goal: Nausea and Vomiting Relief  Outcome: Progressing  Goal: Effective Urinary Elimination  Outcome: Progressing  Goal: Effective Oxygenation and Ventilation  Outcome: Progressing   Goal Outcome Evaluation:    Pt is alert and oriented x 3. He is POD #7 from CABG x3.. Pt has transportation set up for possible discharge tomorrow at [id number] to go to TCU in [place]. Tele- sinus dysrhythmia. Denies pain. Oxygen at 1-2 LPM required at HS for o2 drop.

## 2024-02-22 NOTE — PLAN OF CARE
sc  Patient Name: Nick Zaldiavr   MRN: [id number]   Date of Admission: 2/14/2024    Procedure: Procedure(s):  CORONARY ARTERY BYPASS GRAFT TIMES THREE, LEFT INTERNAL MAMMARY ARTERY HARVEST, BILATERAL LEG ENDOSCOPIC VESSEL PROCUREMENT, EPIAORTIC ULTRASOUND  ANESTHESIA TRANSESOPHAGEAL ECHOCARDIOGRAM    Post Op day #:8    Subjective (Patient focus/Primary Problem for shift): Sleep and comfort.          Pain Goal 0 Pain Rating 2           Pain Medication/ Regime effective to reduce patient pain: Gave tylenol.     Objective (Physical assessment):           Rhythm: normal sinus rhythm            Bowel Activity: yes if Yes indicate when: 02/19/24          Bowel Medications: yes            Incision: healing well          Incentive Spirometry Q 1-2 hour when awake:  yes Volume: 1250 ml                       Patient Activity:           Up to chair for meals: yes          Ambulation with RN x2 (Not including CR): not applicable            Is patient in home clothes:no                      Urinary Catheter: no           Preventative WOC consult (need MD order): no       Assessment (Nursing primary shift focus): Lungs diminished with few crackles at the bases. Occasional inspiratory wheezes with exertion. Unable to wean oxygen while sleeping. Oxygen dropped down to 83% on room air. Tylenol for incisional pain.    Plan (Patient Care Plan/focus): Discharge to acute rehab center today at 2 pm.      Jacki Garsai RN   2/22/2024   5:41 AM

## 2024-02-22 NOTE — PHARMACY-ADMISSION MEDICATION HISTORY
Please see Admission Medication History completed on 2/14/24 under previous encounter at [facility] for information regarding prior to admission medications. Medications were changed during the admission, so please see the discharge summary on 2/22/24 for the most up to date list. The PTA Imdur and lisinopril were stopped during the hospital admission, not explicitly stated why.     Lenora Crabtree, ArmandoD, BCPS

## 2024-02-22 NOTE — CONSULTS
Social Work: Initial Assessment with Discharge Plan    Patient Name: Nick Zaldivar  : 1945  Age: 78 year old  MRN: [id number]  Completed assessment with: Chart review and interview with patient   Admitted to ARU: 2024    Presenting Information   Date of SW assessment: 2024  Health Care Directive: Copy in Chart  Primary Health Care Agent: Patient/self   Secondary Health Care Agent: Larissa and Rodrigo Zaldivar (dtr and oldest son)  Living Situation: Lives in a home in [place], [place] w/ spouse Coretta and adult dtr and son. Dog in the home. 3 PAEMLA. Full flight of stairs up to bedroom and bathroom. Can stay on the main level if needed.   Previous Functional Status: Indep with ADLs and IADLs PTA. Retired. Drives. No assistive devices PTA. No support or services in place PTA. Enjoys walking his dog. Manages medications and finances. Denied falls. [place], lost his hearing aids about 2 weeks ago and prior to hospitalization, using pocket talker.   DME available: See therapy evaluation for more information   Patient and family understanding of hospitalization: Appropriate and pleasant.   Cultural/Language/Spiritual Considerations: 77 y/o male, english-speaking, , and Yarsanism.     Physical Health  Reason for admission:  Cardiac -  s/p CABGx 3 due to history of CAD and aortic stenosis      Justification for Acute Inpatient Rehabilitation  Nick Zaldivar is a 78 year old male with a history of CAD and aortic stenosis who is s/p CABGx 3. Patient's post op course was complicated by increased respiratory needs initially extubated to 6L O2 and has slowly weaned down to 1-2L via nasal cannula. Patient also with a-fib requiring amiodarone bolus which then converted to sinus rhythm. Patient is now medically stable and ready for transfer to acute inpatient rehabilitation. The patient requires transfer to acute inpatient rehabilitation for intensive therapies not available in a lesser level of care  "including PT/OT, ongoing medical management at least three days per week, and rehabilitative nursing cares.      At baseline Hai is completely independent with mobility, is a community ambulator. Enjoys walking his dog. Currently he needs Ax1 for limited mobility. Patient requires an intensive inpatient rehab program to address the following acute impairments: impaired activity tolerance, impaired balance, impaired strength, sternal precautions, and impaired weight shifting due to restriction for pushing through UEs. Hai is an excellent ARC candidate based on his motivation/participation, PLOF and family supports.     Provider Information   Primary Care Physician:  [facility]   [address] [place] [address]   [phone]  Subhash Lundy MD   [address], [place] [id number] [phone] (Work)   [phone] (Fax)    [facility] [place] at discharge.   : None reported     Mental Health/Chemical Dependency:   Diagnosis: None reported and pt denied.   Alcohol/Tobacco/Narcotis: None reported and pt denied.   Support/Services in Place: None reported   Services Needed/Recommended: [place] and Health Psychology support while on ARU available.   Sexuality/Intimacy: Not discussed     Support System  Marital Status: , wife Coretta. Wife has alzheimer's disease. Family caring for wife while pt in the hospital. .   Family support: Larissa (dtr) and Dante (son) live with pt. Son Dante has had strokes but is indep. Dtr helps at home and works from home \"most days\" and can A as needed.   Other support available: None reported or discussed     Community Resources  Current in home services: None reported   Previous services: None reported     Financial/Employment/Education  Employment Status: Retired-Computer design   Income Source: [employer]  Education: Not discussed   Financial Concerns:  None reported and pt denied   Insurance: Medicare and Commercial AARP " "    Discharge Plan   Patient and family discharge goal: TBD, pending progress  Provided Education on discharge plan: Evaluations and discharge recommendations pending.   Patient agreeable to discharge plan:  Pending further discussion. Evaluations and discharge recommendations pending.   Provided education and attained signature for Medicare IM and IRF Patient Rights and Privacy Information provided to patient: YES  Provided patient with [place] [facility] [place] Resources: N/A  Barriers to discharge: None identified at this time     Discharge Recommendations   Disposition: See above   Transportation Needs: Patient, family/friends, paid transport, insurance transport (if applicable)     Additional comments   Discharge TBD, EMILIA 7 days. OP CR anticipated at time of discharge. Good support. No immediate SW needs identified at this time. SW will remain available and continue to follow as needs arise.   -------------------------------------------------------------------------------------------------------------  ZOE Pain Assessment    Pain Effect on Sleep  Over the past 5 days, how much of the time has pain made it hard for you to sleep at night?\"    1. Rarely or not at all    Pain Interference with Therapy Activities  \"Over the past 5 days, how often have you limited your participation in rehabilitation therapy sessions due to pain?\"  1. Rarely or not at all    Pain Interference with Day-to-Day Activities  \"Over the past 5 days, how often have you limited your day-to-day activities (excluding rehabilitation therapy sessions) because of pain?\"  1. Rarely or not at all  -------------------------------------------------------------------------------------------------------------    Shakila George LICSW  [facility], Acute Inpatient Rehab Unit   [facility] [address] [place] [address]  Phone: [phone], Fax: [phone], Pager: [phone]             "

## 2024-02-22 NOTE — PLAN OF CARE
Cardiac Rehab Discharge Summary    Reason for therapy discharge:    Discharged to acute rehabilitation facility.    Progress towards therapy goal(s). See goals on Care Plan in [facility] electronic health record for goal details.  Goals partially met.  Barriers to achieving goals:   discharge from facility.    Therapy recommendation(s):    Continued therapy is recommended.  Rationale/Recommendations:  progress to IND for I/ADLs and CV tolerance during full flight of stairs..

## 2024-02-22 NOTE — PLAN OF CARE
"  Problem: Adult Inpatient Plan of Care  Goal: Readiness for Transition of Care  Outcome: Progressing     Problem: Cardiovascular Surgery  Goal: Optimal Coping with Heart Surgery  Outcome: Progressing  Intervention: Support Psychosocial Response to Surgery  Recent Flowsheet Documentation  Taken 2/22/2024 0830 by Jose R Alexander  Supportive Measures: active listening utilized   Goal Outcome Evaluation:      Plan of Care Reviewed With: patient, family    /54 (BP Location: Left arm)   Pulse 72   Temp 97.6  F (36.4  C) (Oral)   Resp 18   Ht 1.778 m (5' 10\")   Wt 106.4 kg (234 lb 9.6 oz)   SpO2 90%   BMI 33.66 kg/m         Pt is A/Ox4 and on RA during the day. NSR with slightly prolonged QT. Assist x1 with walker in room. Chest xray ordered. Discharge orders received, planned transport to rehab facility today at 2PM. Patient has discharged.    Jose R Alexander           "

## 2024-02-22 NOTE — PROGRESS NOTES
Care Management Discharge Note    Discharge Date: 02/22/2024       Discharge Disposition: Acute Rehab    Discharge Services: None    Discharge DME: None    Discharge Transportation: family or friend will provide    Private pay costs discussed: transportation costs    Does the patient's insurance plan have a 3 day qualifying hospital stay waiver?  No    PAS Confirmation Code: NA  Patient/family educated on Medicare website which has current facility and service quality ratings:  yes    Education Provided on the Discharge Plan: Yes  Persons Notified of Discharge Plans: RN, facility, MD, patient   Patient/Family in Agreement with the Plan: yes    Handoff Referral Completed: Yes    Additional Information:  Patient discharging/transferring to Acute Rehab Center in [place] today.    CM notified all parties.  Patient agrees to potential transportation costs.    No additional CM needs identified.     Nany Mcmillan RN

## 2024-02-22 NOTE — PROGRESS NOTES
Rehab Admissions:  Met with patient this am as therapy is recommending Acute Rehab and patient is agreeable to [facility]. Provided education on ELOS, therapy expectations, items to bring, facility location, MD involvement, rehab nursing, and therapy options after rehab stay. Provided brochure to patient.     Thank you for the referral, we will continue to follow this patient for post acute placement with plan for admission today 2/22. Please call if any questions.     Determination of admission is based upon the patient's need for an intensive, interdisciplinary approach to rehabilitation, their ability to progress, their ability to tolerate intensive therapies, their need for daily physician supervision, their need for twenty four hour nursing assistance, and their ability and willingness to participate in such a program.    Larissa Mendieta MS, OTR/L  Rehab Liaison/  [facility]  2/22/2024    11:11 AM

## 2024-02-23 ENCOUNTER — APPOINTMENT (OUTPATIENT)
Dept: OCCUPATIONAL THERAPY | Facility: CLINIC | Age: 79
DRG: 949 | End: 2024-02-23
Attending: PHYSICAL MEDICINE & REHABILITATION
Payer: MEDICARE

## 2024-02-23 ENCOUNTER — APPOINTMENT (OUTPATIENT)
Dept: PHYSICAL THERAPY | Facility: CLINIC | Age: 79
DRG: 949 | End: 2024-02-23
Attending: PHYSICAL MEDICINE & REHABILITATION
Payer: MEDICARE

## 2024-02-23 PROCEDURE — 97166 OT EVAL MOD COMPLEX 45 MIN: CPT | Mod: GO

## 2024-02-23 PROCEDURE — 97110 THERAPEUTIC EXERCISES: CPT | Mod: GP | Performed by: PHYSICAL THERAPIST

## 2024-02-23 PROCEDURE — 99231 SBSQ HOSP IP/OBS SF/LOW 25: CPT | Performed by: PHYSICAL MEDICINE & REHABILITATION

## 2024-02-23 PROCEDURE — 97530 THERAPEUTIC ACTIVITIES: CPT | Mod: GO

## 2024-02-23 PROCEDURE — 250N000013 HC RX MED GY IP 250 OP 250 PS 637: Performed by: PHYSICIAN ASSISTANT

## 2024-02-23 PROCEDURE — 97129 THER IVNTJ 1ST 15 MIN: CPT | Mod: GO

## 2024-02-23 PROCEDURE — 97535 SELF CARE MNGMENT TRAINING: CPT | Mod: GO

## 2024-02-23 PROCEDURE — 128N000003 HC R&B REHAB

## 2024-02-23 PROCEDURE — 97130 THER IVNTJ EA ADDL 15 MIN: CPT | Mod: GO

## 2024-02-23 PROCEDURE — 250N000013 HC RX MED GY IP 250 OP 250 PS 637: Performed by: PHYSICAL MEDICINE & REHABILITATION

## 2024-02-23 PROCEDURE — 97162 PT EVAL MOD COMPLEX 30 MIN: CPT | Mod: GP | Performed by: PHYSICAL THERAPIST

## 2024-02-23 RX ORDER — LIDOCAINE 4 G/G
1 PATCH TOPICAL
Status: DISCONTINUED | OUTPATIENT
Start: 2024-02-23 | End: 2024-03-02 | Stop reason: HOSPADM

## 2024-02-23 RX ADMIN — Medication 1 TABLET: at 08:08

## 2024-02-23 RX ADMIN — SENNOSIDES AND DOCUSATE SODIUM 1 TABLET: 8.6; 5 TABLET ORAL at 20:31

## 2024-02-23 RX ADMIN — Medication 37.5 MG: at 20:31

## 2024-02-23 RX ADMIN — FUROSEMIDE 40 MG: 40 TABLET ORAL at 17:23

## 2024-02-23 RX ADMIN — AMIODARONE HYDROCHLORIDE 200 MG: 200 TABLET ORAL at 08:08

## 2024-02-23 RX ADMIN — ACETAMINOPHEN 650 MG: 325 TABLET, FILM COATED ORAL at 05:25

## 2024-02-23 RX ADMIN — LIDOCAINE 1 PATCH: 4 PATCH TOPICAL at 08:08

## 2024-02-23 RX ADMIN — SENNOSIDES AND DOCUSATE SODIUM 1 TABLET: 8.6; 5 TABLET ORAL at 08:08

## 2024-02-23 RX ADMIN — ROSUVASTATIN CALCIUM 40 MG: 20 TABLET, FILM COATED ORAL at 20:32

## 2024-02-23 RX ADMIN — PANTOPRAZOLE SODIUM 40 MG: 40 TABLET, DELAYED RELEASE ORAL at 05:25

## 2024-02-23 RX ADMIN — ACETAMINOPHEN 650 MG: 325 TABLET, FILM COATED ORAL at 20:31

## 2024-02-23 RX ADMIN — Medication 37.5 MG: at 08:07

## 2024-02-23 RX ADMIN — APIXABAN 5 MG: 5 TABLET, FILM COATED ORAL at 08:08

## 2024-02-23 RX ADMIN — NYSTATIN: 100000 CREAM TOPICAL at 09:11

## 2024-02-23 RX ADMIN — NYSTATIN: 100000 CREAM TOPICAL at 20:39

## 2024-02-23 RX ADMIN — ASPIRIN 81 MG: 81 TABLET, COATED ORAL at 08:08

## 2024-02-23 RX ADMIN — APIXABAN 5 MG: 5 TABLET, FILM COATED ORAL at 20:31

## 2024-02-23 ASSESSMENT — ACTIVITIES OF DAILY LIVING (ADL)
ADLS_ACUITY_SCORE: 30
BADLS,_PREVIOUS_FUNCTIONAL_LEVEL: INDEPENDENT
ADLS_ACUITY_SCORE: 30
ADLS_ACUITY_SCORE: 30
PREVIOUS_RESPONSIBILITIES: MEAL PREP;HOUSEKEEPING;LAUNDRY;SHOPPING;MEDICATION MANAGEMENT;FINANCES;DRIVING;WORK
ADLS_ACUITY_SCORE: 30
IADLS,_PREVIOUS_FUNCTIONAL_LEVEL: INDEPENDENT
ADLS_ACUITY_SCORE: 30

## 2024-02-23 NOTE — PHARMACY-MEDICATION REGIMEN REVIEW
Pharmacy Medication Regimen Review  Nick Zaldivar is a 78 year old male who is currently in the Acute Rehab Unit.    Assessment: Upon review of the medications and patient chart the following irregularities were found:     Medications without defined duration  #Pantoprazole - Patient was started on PPI therapy during ICU stay. Post-CVTS surgery patients are often started on PPI therapy for 30 days post-surgery to prevent GI bleeding associated with high mortality. If there is no compelling indication for PPI therapy beyond this 30-day period, consider discontinuing.     Significant Drug Interactions:   #Amiodarone + Apixaban - CY and p-glycoprotein inhibition by amiodarone results in increased apixaban exposure, increasing risk of bleeding events. No empiric apixaban dose adjustment recommended, monitor patient for signs/symptoms of bleeding.     Plan:   Continue current medications as ordered.   Consider stopping PPI 30 days after cardiac surgery (3/12), if no other indication for PPI therapy.    Attending provider will be sent this note for review.  If there are any emergent issues noted above, pharmacist will contact provider directly by phone.      Pharmacy will periodically review the resident's medication regimen for any PRN medications not administered in > 72 hours and discontinue them. The pharmacist will discuss gradual dose reductions of psychopharmacologic medications with interdisciplinary team on a regular basis.    Please contact pharmacy if the above does not answer specific medication questions/concerns.    Background:  A pharmacist has reviewed all medications and pertinent medical history today.  Medications were reviewed for appropriate use and any irregularities found are listed with recommendations.      Current Facility-Administered Medications:     acetaminophen (TYLENOL) tablet 650 mg, 650 mg, Oral, Q4H PRN, Nelly Huddleston PA, 650 mg at 24 0525    amiodarone (PACERONE)  tablet 200 mg, 200 mg, Oral, Daily, Nelly Huddleston PA, 200 mg at 02/23/24 0808    apixaban ANTICOAGULANT (ELIQUIS) tablet 5 mg, 5 mg, Oral, BID, Nelly Huddleston PA, 5 mg at 02/23/24 0808    aspirin EC tablet 81 mg, 81 mg, Oral, Daily, Nelly Huddleston PA, 81 mg at 02/23/24 0808    bisacodyl (DULCOLAX) suppository 10 mg, 10 mg, Rectal, Daily PRN, Nelly Huddleston PA    furosemide (LASIX) tablet 40 mg, 40 mg, Oral, BID, Nelly Huddleston PA    guaiFENesin (MUCINEX) 12 hr tablet 600 mg, 600 mg, Oral, BID PRN, Nelly Huddleston PA    ipratropium - albuterol 0.5 mg/2.5 mg/3 mL (DUONEB) neb solution 3 mL, 3 mL, Nebulization, Q4H PRN, Nelly Huddleston PA    Lidocaine (LIDOCARE) 4 % Patch 1 patch, 1 patch, Transdermal, Q24H, Alejandra Summers MD, 1 patch at 02/23/24 0808    magnesium hydroxide (MILK OF MAGNESIA) suspension 30 mL, 30 mL, Oral, Daily PRN, Nelly Huddleston PA    metoprolol tartrate (LOPRESSOR) half-tab 37.5 mg, 37.5 mg, Oral, BID, Nelly Huddleston PA, 37.5 mg at 02/23/24 0807    multivitamin w/minerals (THERA-VIT-M) tablet 1 tablet, 1 tablet, Oral, Daily, Nelly Huddleston PA, 1 tablet at 02/23/24 0808    nitroGLYcerin (NITROSTAT) sublingual tablet 0.4 mg, 0.4 mg, Sublingual, Q5 Min PRN, Nelly Huddleston PA    nystatin (MYCOSTATIN) cream, , Topical, BID, Nelly Huddleston PA, Given at 02/22/24 2010    pantoprazole (PROTONIX) EC tablet 40 mg, 40 mg, Oral, QAM AC, eNlly Huddleston PA, 40 mg at 02/23/24 2489    Patient is already receiving anticoagulation with heparin, enoxaparin (LOVENOX), warfarin (COUMADIN)  or other anticoagulant medication, , Does not apply, Continuous PRN, Nelly Huddleston PA    polyethylene glycol (MIRALAX) Packet 17 g, 17 g, Oral, Daily PRN, Nelly Huddleston PA    rosuvastatin (CRESTOR) tablet 40 mg, 40 mg, Oral, At Bedtime, Nelly Huddleston PA, 40 mg at 02/22/24 2008    senna-docusate (SENOKOT-S/PERICOLACE) 8.6-50 MG per tablet 1 tablet, 1  tablet, Oral, BID, Nelly Huddleston PA, 1 tablet at 02/23/24 0808  No current outpatient prescriptions on file.   PMH: moderate aortic valve stenosis, CAD with prior PCI, Heart failure with preserved EF, HTN, HLD, obesity   Nick Weber, ArmandoD

## 2024-02-23 NOTE — PROGRESS NOTES
"  VS: /53 (BP Location: Left arm)   Pulse 77   Temp 98.3  F (36.8  C) (Oral)   Resp 20   Ht 1.778 m (5' 10\")   Wt 107.3 kg (236 lb 8.9 oz)   SpO2 92%   BMI 33.94 kg/m     O2: On 2L O2 NC no SOB or chest pain reported   Output: Continent   Last BM: 2/23   Activity: Ax1 with walker and GB, only GB needed if pt is using bathroom   Skin: Sternal incisions, R ankle, bulging umbilical    Pain: No pain reported   CMS: A&O x4   Dressing: R ankle   Diet: Regular   LDA: None   Equipment: Walker, GB   Plan: Continue POC   Additional Info:        "

## 2024-02-23 NOTE — PLAN OF CARE
Goal Outcome Evaluation:      Plan of Care Reviewed With: patient    Overall Patient Progress: no changeOverall Patient Progress: no change     Patient alert and oriented x4, able to make needs known and use call light, bed alarm on for safety. Denies SOB, chest pain and lightheadedness, 02 at 2 lpm via nasal cannula 02 saturation above 90s. PRN Tylenol given for neck pain. Random bladder scan 295 at 0520H , fluids encouraged. Voided 500 ml at 0630, bladder scan post void 176.

## 2024-02-23 NOTE — PROGRESS NOTES
Admitted to the facility around 1450H per EMS transport    Diagnosis: s/p CABGx3 due to hx of CAD and aortic stenosis  Orientation: AOX4; hard of hearing  Bowel: Continent per report LBM: 2/19 Scheduled Senna given  Bladder: Continent; PVRx1 done, still needing one more  Pain: denied  Ambulation/Transfers: A1 with walker  Diet/Liquids: Tolerating diet well; took pills whole with thin liquids  Skin: see flowsheets  On 02 at 2L/min via nasal cannula; saturating at 95%.    Oriented to the unit. Able to make needs known. Uses call light appropriately. Bed alarm on for safety. Continue POC

## 2024-02-23 NOTE — PLAN OF CARE
Discharge Planner Post-Acute Rehab OT:     Discharge Plan: home with assist for heavy IADLs, OP CR    Precautions: fall, sternal, O2 for activity    Current Status:  ADLs:  Mobility: SBA without AD in room, SBA with 4WW for hallway  Grooming: SBA  Dressing: UB: TBD LB: TBD Footwear: Min A with use of reacher,sock aid  Bathing: TBD  Toileting: SBA with grab bar  IADLs: completes cooking, grocery shopping, driving at baseline, anticipate will need increased assist with heavy IADLs at discharge  Vision/Cognition: glasses + double vision at baseline, ACE III score 86/100--mild memory impairments    Assessment: Pt is a 79 y/o male s/p CABGx3 with deficits in activity tolerance, balance, and increased O2 needs with activity impacting safety/engagement in ADLs/IADLs. Will benefit from skilled OT to progress IND and safety. ELOS 7-10 days.    Other Barriers to Discharge (DME, Family Training, etc): TBD  Reports daughter has ordered shower chair

## 2024-02-23 NOTE — PROGRESS NOTES
"  [facility]   Acute Rehabilitation Unit  Daily progress note    INTERVAL HISTORY  Nick Zaldivar was seen and examined at bedside. No acute events overngiht.  Denies new chest pain, shortness of breath, no fever or chills.  Random bladder scan 295 at 0520H , fluids encouraged. Voided 500 ml at 0630, bladder scan post void 176.  Goal is for BM today.  Discussed rehab goals and recovery timeline.          Functionally, evals today    ROS: 10 point ROS neg other than the symptoms noted above in the HPI.          MEDICATIONS  Scheduled meds   amiodarone  200 mg Oral Daily    apixaban ANTICOAGULANT  5 mg Oral BID    aspirin  81 mg Oral Daily    furosemide  40 mg Oral BID    lidocaine  1 patch Transdermal Q24H    metoprolol tartrate  37.5 mg Oral BID    multivitamin w/minerals  1 tablet Oral Daily    nystatin   Topical BID    pantoprazole  40 mg Oral QAM AC    rosuvastatin  40 mg Oral At Bedtime    senna-docusate  1 tablet Oral BID       PRN meds:  acetaminophen, bisacodyl, guaiFENesin, ipratropium - albuterol 0.5 mg/2.5 mg/3 mL, magnesium hydroxide, nitroGLYcerin, - MEDICATION INSTRUCTIONS -, polyethylene glycol      PHYSICAL EXAM  /53 (BP Location: Left arm)   Pulse 77   Temp 98.3  F (36.8  C) (Oral)   Resp 20   Ht 1.778 m (5' 10\")   Wt 107.3 kg (236 lb 8.9 oz)   SpO2 92%   BMI 33.94 kg/m    Gen: NAD, resting in bed  HEENT: NCAT, EOMI, wearing NC O2  Cardio: 2+ radial pulse, well perfused  Pulm: nonlabored, symmetrical chest rise  Abd: protuberant, nontender  Ext: there is edema in B/L LE, non tender, Bandage on RLE medail distal leg CDI  Neuro/MSK: AAOx3, full AROM thoughout     LABS    Lab Results   Component Value Date    WBC 10.5 02/20/2024     Lab Results   Component Value Date    RBC 3.02 02/20/2024     Lab Results   Component Value Date    HGB 9.0 02/20/2024     Lab Results   Component Value Date    HCT 28.2 02/20/2024     Lab Results   Component Value Date    " MCV 93 02/20/2024     Lab Results   Component Value Date    MCH 29.8 02/20/2024     Lab Results   Component Value Date    MCHC 31.9 02/20/2024     Lab Results   Component Value Date    RDW 14.0 02/20/2024     Lab Results   Component Value Date     02/21/2024     Last Comprehensive Metabolic Panel:  Lab Results   Component Value Date     02/20/2024    POTASSIUM 4.7 02/22/2024    CHLORIDE 99 02/20/2024    CO2 30 (H) 02/20/2024    ANIONGAP 9 02/20/2024     (H) 02/22/2024    BUN 32.5 (H) 02/20/2024    CR 1.11 02/20/2024    GFRESTIMATED 68 02/20/2024    BUBBA 8.4 (L) 02/20/2024           ASSESSMENT AND PLAN  Nick Zaldivar is a 78 year old man with past medical history of HTN, HLD, aortic stenosis, obesity, heart failure with preserved EF and CAD who was admitted for planned CABG & AVR, underwent 3 vessel coronary artery bypass 2/14/24 valve replacement was unable to be performed. Hospital course complicated by hypervolemia, atelectasis, acute blood loss anemia, and A-fib.  Functionally noted to have impaired strength, impaired balance, and impaired activity tolerance, he was admitted to rehab 2/22/24.         Admission to acute inpatient rehab s/p CABG x 3.    Impairment group code: 9        PT, OT 90 minutes of each on a daily basis up to 6 days per week, in addition to rehab nursing and close management of physiatrist.       Impairment of ADL's: Noted to have impaired strength, impaired activity tolerance, and impaired balance leading to decreased ability to independently complete ADL's.  Will benefit from ongoing OT with goal for MOD I with basic ADLs.      Impairment of mobility:  Noted to have impaired strength, impaired activity tolerance, and impaired balance leading to decreased mobility.  Will benefit from ongoing PT with goal for BHARATH with basic mobility.      Medical Conditions  CAD s/p CABG x 3    Completed 2/14/24 per Dr. Flanagan.   -sternal precautions x 8 weeks  -cleanse incision  daily  -had 2 staples left on the right distal leg area - asked the nursing team to remove, continue monitor site  -asa 81  -continue crestor  -follow up cv surgery     Post operative A-fib  -started on amiodarone tapered down and admitted to aru on 200 mg daily x 21 days  -continue apixaban  -continue metoprolol  -follow up Dr. Michael as scheduled.      HF preserved EF  -continue metoprolol 37.5 mg bid  -continue lasix 40 mg bid (home dose)  -monitor weight and volume status daily      Hypoxic respiratory failure  Atelectasis  -mucinex bid prn  -duo nebs bid prn   -encourage IS  -monitor oxygen level- oxygen prn to keep sats >90%     Acute blood loss anemia  Hgb 13.9 pre-op down to 8.9 2/16/24 stable 9.0 2/20.  -trend     Pre Diabetes  Hgb A1C 5.9%  -trend with primary care     Polyneuropathy ?   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 This can be due to DM vs PAD vs idiopathic vs combination. Should follow up with PCP for basic screening blood work (SPEP, protein ASHLIE, B12 with refelct MMA, B6, B1 - A1c and TSH were checked recently) and possible NCS/EMG. Needs education on excellent skin/nail monitoring and care to avoid future complications.        Adjustment to disability:  Clinical psychology to eval and treat as indicated  FEN: reg  Bowel: monitor; he is constipated so consider increasing senokot dose and scheduling miralax pending his course   Bladder: monitor  DVT Prophylaxis: apixaban  GI Prophylaxis: PPI  Code: full - confirmed upon admission   Disposition: goal for home  ELOS:  one week - impaired endurance and stairs at home are main barriers. Might need regular rehab and then cardiac rehab due to poor endurance.    Rehab prognosis:  good  Follow up Appointments on Discharge: pcp, cardiology, cv surgery          De Horta, DO  Physical Medicine & Rehabilitation    I spent a total of  25 minutes face to face and coordinating care of  Nick Zaldivar.  Over 50% of my time on the unit was spent counseling the patient and /or coordinating care regarding post cardiac surgery.

## 2024-02-23 NOTE — PLAN OF CARE
Discharge Planner Post-Acute Rehab PT:     Discharge Plan: home w/ spouse, adult children    Precautions: falls, sternal    Current Status:  Bed Mobility: ind w/ HOB 40*  Transfer: supervision  Gait: no device in room, CGA; 4ww hallway w/ O2, CGA/SBA  Stairs: 13 step ups in stairwell w/ rail (did not climb flight due to short length of O2 tubing)  Balance: stands w/o UE support    Outcome Measures:   Gait speed 2/23:        -w/o device: 0.3 m/s       -w/ 4ww: 0.43 m/s    Assessment:  deconditioning acute on chronic due to CHD, will benefit from skilled PT to gain ind w/ functional mobility so he can return home w/ his family.     Other Barriers to Discharge (DME, Family Training, etc):   Stairs to access bathroom/shower

## 2024-02-23 NOTE — PROGRESS NOTES
02/23/24 0922   Appointment Info   Signing Clinician's Name / Credentials (PT) Denisse Bagley, PT   Living Environment   People in Home child(jonelle), adult;spouse   Current Living Arrangements house   Home Accessibility stairs within home   Number of Stairs, Within Home, Primary greater than 10 stairs   Transportation Anticipated family or friend will provide   Self-Care   Usual Activity Tolerance moderate   Current Activity Tolerance fair   Regular Exercise No   Equipment Currently Used at Home none   Fall history within last six months no   Activity/Exercise/Self-Care Comment Dog park 2x/wk, planned his walking there from bench to bench, ~100yds; drove, could walk in grocery store w/ cart   Post-Acute Assessment Only   Post-Acute Functional Assessment See below   General Information   Onset of Illness/Injury or Date of Surgery 02/14/24   Referring Physician Melina Horta   Patient/Family Therapy Goals Statement (PT) climb stairs to be at home   Pertinent History of Current Problem (include personal factors and/or comorbidities that impact the POC) planned 3 vssel CABG   Existing Precautions/Restrictions sternal;fall   Heart Disease Risk Factors Diabetes;High blood pressure;Lack of physical activity;Dislipidemia;Overweight;Medical history;Gender;Age   Cognition   Affect/Mental Status (Cognition) WNL   Orientation Status (Cognition) oriented x 3   Follows Commands (Cognition) follows three-step commands   Safety Deficit (Cognition) minimal deficit   Pain Assessment   Patient Currently in Pain No   Integumentary/Edema   Integumentary/Edema Comments (R) ankle w/ dressing, CDI   Posture    Posture Not impaired   Range of Motion (ROM)   Range of Motion ROM is WFL   ROM Comment no focal deficits   Strength (Manual Muscle Testing)   Strength Comments grossly 4/5, no focal deficits   Balance   Balance Comments stands w/o UE support   Sensory Examination   Sensory Perception Comments reported LE neuropathy 'for  years'   Coordination   Coordination Comments no tremor, dysmetira, ataxia   Muscle Tone   Muscle Tone no deficits were identified   Clinical Impression   PT Diagnosis (PT) deconditioning related to aortic valve stenosis and CAD   Influenced by the following impairments slow gait speed, SOB w/ activity   Functional limitations due to impairments assist w/ transfers, bed mobility, ambulation   Clinical Presentation (PT Evaluation Complexity) evolving   Clinical Decision Making (Complexity) moderate complexity   Planned Therapy Interventions (PT) home exercise program;groups;neuromuscular re-education;strengthening;progressive activity/exercise;home program guidelines   Risk & Benefits of therapy have been explained evaluation/treatment results reviewed;care plan/treatment goals reviewed;risks/benefits reviewed;current/potential barriers reviewed;participants voiced agreement with care plan;participants included;patient   Clinical Impression Comments acute on chronic deconditioning due to CHD, hospitalization; will benefit from skilled PT so he can return home w/ his family.   PT Total Evaluation Time   PT Comfort, Moderate Complexity Minutes (96085) 25   Physical Therapy Goals   PT Frequency 6x/week   PT Predicted Duration/Target Date for Goal Attainment 03/04/24   PT: Bed Mobility Independent   PT: Transfers Independent   PT: Gait Modified independent;Greater than 200 feet;Rolling walker   PT: Stairs Modified independent;Supervision/stand-by assist;Greater than 10 stairs;Rail on left   PT: Perform aerobic activity with stable cardiovascular response intermittent activity;15 minutes   PT: Goal 1 car transfer ind   PT: Goal 2 ind w/ HEP for walking program and LE strengthening   Therapeutic Procedure/Exercise   Treatment Detail/Skilled Intervention PT: focus on activity tolerance: walking room and hallway, trial both with and without walker, switched ww to 4ww in room; giat speed w/o walker .3 m/s, w/ 4ww .43 m/s   PT  Discharge Planning   PT Plan PT: car, stairs   Total Session Time   Total Session Time (sum of timed and untimed services) 25   Post Acute Settings Only   What unit is patient on? Acute Rehab   PT - Acute Rehab Center Time   Individual Time (minutes) - PT 50  (Ther ex 25, eval 25)   Group Time (minutes) - PT 0   Concurrent Time (minutes) - PT 0   Co-Treatment Time (minutes) - PT 0   ARC Total Session Time (minutes) - PT 50   ARC Daily Total Session Time   PT ARC Daily Total Session Time 50   ARC Daily Rehab Total Minutes 50   Comprehension   Comprehension Comment hearing aides lost, ordered Pocket Talker   Expression   Expression Comment able to make needs known   Social Interaction   Social Interaction Comment appropriate   Problem Solving   Problem Solving Comment assist w/ complex   Memory   Memory Comment assist w/ complex   Chair/bed-to-chair Transfer   Complete independence for chair-bed-to-chair transfer no   Describe performance able to stand from standard ht chair w/o UE support   Physical assistance for getting from chair-bed-to-chair Steadying assist for getting from chair-bed-to-chair   Roll Left and Right   Assistance Needed Independent   Roll Left to Right CARE Score 6   Sit to Lying   Assistance Needed Adaptive equipment   Sit to Lying CARE Score 6   Lying to Sitting on Side of Bed   Assistance Needed Adaptive equipment   Lying to Sitting CARE Score 6   Sit to Stand   Assistance Needed Supervision   Sitting to Standing CARE Score 4   Locomotion   Assistive Devices Rollator (four-wheel walker)   Locomotion Comment slow gait speed, CARRILLO   Walk 10 Feet   Physical Assistance Level Contact guard assist   Walk 10 Ft. CARE Score 4   Walk 50 Feet with Two Turns   Patient Performance Adaptive equipment   Physical Assistance Level Contact guard assist   Walk 50 Ft. CARE Score 4   Walk 150 Feet   Comment unable today to walk this distance, will not be a barrier for home and expect him to improve   Reason if not  Attempted Medical concerns   Walk 150 Ft. CARE Score 88   Walking 10 Feet on Uneven Surfaces   Physical Assistance Level Contact guard assist   Walking 10 Feet on Uneven Surfaces CARE Score 4   Wheel 50 Feet with Two Turns   Reason if not Attempted Activity not applicable   Wheel 50 Feet with Two Turns CARE Score 9   Wheel 150 Feet   Reason if not Attempted Activity not applicable   Wheel 150 Feet CARE Score 9   Picking Up Object   Reason if not Attempted Medical concerns    CARE Score 88

## 2024-02-23 NOTE — PROGRESS NOTES
02/23/24 1107   Appointment Info   Signing Clinician's Name / Credentials (OT) Ciera Hurstmalcom, OTR/L   Living Environment   People in Home spouse;child(jonelle), adult   Current Living Arrangements house   Home Accessibility stairs within home   Number of Stairs, Within Home, Primary greater than 10 stairs   Stair Railings, Within Home, Primary railings safe and in good condition   Transportation Anticipated family or friend will provide   Living Environment Comments Reports living in house with daughter works from home, son who has had strokes and cannot assist physical, and wife receives assist from patient at baseline. All needs met on 1 level. Walk in shower.   Self-Care   Usual Activity Tolerance moderate   Current Activity Tolerance fair   Regular Exercise No   Equipment Currently Used at Home none   Fall history within last six months no   Activity/Exercise/Self-Care Comment Pt reports IND at baseline for all ADLs. Does not use equipment or AD at baseline.   Instrumental Activities of Daily Living (IADL)   Previous Responsibilities meal prep;housekeeping;laundry;shopping;medication management;finances;driving;work   IADL Comments pt reports primary caretaker for wife. Completes most cooking. Children complete most cleaning. Manages meds with pillbox.   Post-Acute Assessment Only   Post-Acute Functional Assessment See below   Previous Level of Function/Home Environm   Bathing, Previous Functional Level independent   Grooming, Previous Functional Level independent   Dressing, Previous Functional Level independent   Eating/Feeding, Previous Functional Level independent   Toileting, Previous Functional Level independent   BADLs, Previous Functional Level independent   IADLs, Previous Functional Level independent   Bed Mobility, Previous Functional Level independent   Transfers, Previous Functional Level independent   Household Ambulation, Previous Functional Level independent   Stairs, Previous Functional Level  independent   Community Ambulation, Previous Functional Level independent   Functional Cognition, Previous Functional Level IND   Previous Level of Function IND   General Information   Onset of Illness/Injury or Date of Surgery 02/14/24   Referring Physician Nelly Huddleston PA   Patient/Family Therapy Goal Statement (OT) To get home and get back to his duties   Additional Occupational Profile Info/Pertinent History of Current Problem Per H&P: 78 year old man with past medical history of moderate aortic valve stenosis, CAD with prior PCI, Heart failure with preserved EF, HTN, HLD, obesity, who was admitted 2/14/24 for planned CABG x3, plan for avr but unable due to calcifications.  He was extubated 2/14/24.  Transferred out of ICU 2/16/24.  He had mild intermittent hypoxic respiratory failure felt to be 2/2 atelectasis.  Additionally had post operative A-fib which resolved after amiodarone administration.   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, has a dog, likes to work at his computer   Existing Precautions/Restrictions sternal;cardiac;fall   Limitations/Impairments insensate body part;hearing  (hearing aids)   Left Upper Extremity (Weight-bearing Status) other (see comments)  (<10#s)   Right Upper Extremity (Weight-bearing Status) other (see comments)  (<10#s)   Left Lower Extremity (Weight-bearing Status) full weight-bearing (FWB)   Right Lower Extremity (Weight-bearing Status) full weight-bearing (FWB)   General Observations and Info Activity Up with assist   Cognitive Status Examination   Orientation Status orientation to person, place and time   Affect/Mental Status (Cognitive) WFL   Follows Commands WFL   Memory Deficit minimal deficit;working memory   Cognitive Status Comments Pt appears grossly cognitively intact throughout obs/interivew although demonstrates mild difficulty with delayed recall. Will benefit from further assessment of higher level cognitive  skills.   Visual Perception   Visual Impairment/Limitations corrective lenses for distance;diplopia   Impact of Vision Impairment on Function (Vision) Pt reports history of double vision for last 3 years.   Sensory   Sensory Quick Adds other (see comments)   Sensory Comments Pt reports peripheral neuropathy in hands/feet at baseline.   Pain Assessment   Patient Currently in Pain No   Range of Motion Comprehensive   General Range of Motion bilateral upper extremity ROM WFL   Comment, General Range of Motion within prec   Strength Comprehensive (MMT)   General Manual Muscle Testing (MMT) Assessment other (see comments)   Comment, General Manual Muscle Testing (MMT) Assessment BUE strength NT 2/2 sternal prec. Demonstrates generalized deconditioning.   Muscle Tone Assessment   Muscle Tone Quick Adds No deficits were identified   Coordination   Upper Extremity Coordination No deficits were identified   Bed Mobility   Comment (Bed Mobility) See GG.   Transfers   Transfer Comments See GG.   Balance   Balance Comments See GG.   Activities of Daily Living   BADL Assessment/Intervention other (see comments)  (See GG.)   Clinical Impression   Criteria for Skilled Therapeutic Interventions Met (OT) Yes, treatment indicated   OT Diagnosis decreased safety/engagement in ADLs/IADLs now with post surgical precautions   Influenced by the following impairments impaired balance, impaired activity tolerance, impaired cognition, post surgical precautions, increased oxygen needs   OT Problem List-Impairments impacting ADL problems related to;activity tolerance impaired;cognition;balance;mobility;hearing;sensation;strength;pain;post-surgical precautions;sensory feedback   Assessment of Occupational Performance 5 or more Performance Deficits   Identified Performance Deficits bathing, dressing, caregiving, toileting, home mgmt, pet care   Planned Therapy Interventions (OT) ADL retraining;IADL retraining;balance  training;cognition;ROM;strengthening;stretching;transfer training;visual perception;home program guidelines;progressive activity/exercise;risk factor education   Clinical Decision Making Complexity (OT) detailed assessment/moderate complexity   Risk & Benefits of therapy have been explained evaluation/treatment results reviewed;care plan/treatment goals reviewed;risks/benefits reviewed;current/potential barriers reviewed;participants voiced agreement with care plan;participants included;patient   Clinical Impression Comments Pt will benefit from continued skilled OT services to progress IND and safety with ADLs/IADLs.   OT Total Evaluation Time   OT Eval, Moderate Complexity Minutes (95245) 30   OT Goals   Therapy Frequency (OT) 6 times/week   OT Predicted Duration/Target Date for Goal Attainment 03/04/24   OT Goals Hygiene/Grooming;Upper Body Dressing;Lower Body Dressing;Upper Body Bathing;Lower Body Bathing;Toilet Transfer/Toileting;Meal Preparation;Home Management;Cognition;Transfers   OT: Hygiene/Grooming independent;modified independent   OT: Upper Body Dressing Independent;Supervision/stand-by assist   OT: Lower Body Dressing Independent;Modified independent   OT: Upper Body Bathing Independent;Modified independent   OT: Lower Body Bathing Independent;Modified independent   OT: Transfer Independent;Modified independent  (walk in shower transfer with appropriate DME)   OT: Toilet Transfer/Toileting Independent;Modified independent   OT: Meal Preparation Independent;Modified independent;with simple meal preparation;ambulatory level;within precautions   OT: Home Management Independent;Modified independent;with light demand household tasks;ambulatory level;within precautions   OT: Cognitive Patient/caregiver will verbalize understanding of cognitive assessment results/recommendations as needed for safe discharge planning   Self-Care/Home Management   Self-Care/Home Mgmt/ADL, Compensatory, Meal Prep Minutes (89766)  "30   Symptoms Noted During/After Treatment (Meal Preparation/Planning Training) fatigue;shortness of breath;increase work of breath   Treatment Detail/Skilled Intervention Session focused on completion of GG. See below for details. Increased time for monitoring vitals as pt initally reporting \"feeling woozy\" upon OT arrival. Towards end of session, pt demosntrating increased SOB, cues provided for PLB and slowing deep breathing. Issued reacher and sock aid to progress EC/WS for ADL tasks with pt progressing to complete LB dressing with Min A during session.   OT Discharge Planning   OT Plan shower: 4WW to shower chair, (bring O2 tank), complete GG (UB/LB dressing), progress standing activity tolerance   Total Session Time   Timed Code Treatment Minutes 30   Total Session Time (sum of timed and untimed services) 60   Post Acute Settings Only   What unit is patient on? Acute Rehab   OT - Acute Rehab Center Time   Individual Time (minutes) - OT 60   Group Time (minutes) - OT 0   Concurrent Time (minutes) - OT 0   Co-Treatment Time (minutes) - OT 0   ARC Total Session Time (minutes) - OT 60   ARC Daily Total Session Time   OT ARC Daily Total Session Time 60   ARC Daily Rehab Total Minutes 110   Bowel   Functional Performance Continent and uses toilet in bathroom   Bladder   Functional Performance Continent and uses toilet in bathroom   Oral Hygiene   Complete independence for oral hygiene tasks no   Describe performance SBA standing at sink   Grooming (except oral cares)   Grooming Task Performed Washing hands;Shaving;Washing face   Grooming Comment SBA standing at sink   Lower Body Dressing putting on/taking off footwear   Describe performance Max A   Toilet Hygiene   Describe performance SBA   Toilet Transfer   Describe performance SBA with grab abr     "

## 2024-02-24 ENCOUNTER — APPOINTMENT (OUTPATIENT)
Dept: PHYSICAL THERAPY | Facility: CLINIC | Age: 79
DRG: 949 | End: 2024-02-24
Attending: PHYSICAL MEDICINE & REHABILITATION
Payer: MEDICARE

## 2024-02-24 ENCOUNTER — APPOINTMENT (OUTPATIENT)
Dept: OCCUPATIONAL THERAPY | Facility: CLINIC | Age: 79
DRG: 949 | End: 2024-02-24
Attending: PHYSICAL MEDICINE & REHABILITATION
Payer: MEDICARE

## 2024-02-24 PROCEDURE — 99231 SBSQ HOSP IP/OBS SF/LOW 25: CPT | Mod: GC | Performed by: PHYSICAL MEDICINE & REHABILITATION

## 2024-02-24 PROCEDURE — 97530 THERAPEUTIC ACTIVITIES: CPT | Mod: GP

## 2024-02-24 PROCEDURE — 250N000013 HC RX MED GY IP 250 OP 250 PS 637: Performed by: PHYSICAL MEDICINE & REHABILITATION

## 2024-02-24 PROCEDURE — 97535 SELF CARE MNGMENT TRAINING: CPT | Mod: GO

## 2024-02-24 PROCEDURE — 250N000013 HC RX MED GY IP 250 OP 250 PS 637: Performed by: PHYSICIAN ASSISTANT

## 2024-02-24 PROCEDURE — 128N000003 HC R&B REHAB

## 2024-02-24 RX ADMIN — Medication 37.5 MG: at 21:18

## 2024-02-24 RX ADMIN — APIXABAN 5 MG: 5 TABLET, FILM COATED ORAL at 21:18

## 2024-02-24 RX ADMIN — APIXABAN 5 MG: 5 TABLET, FILM COATED ORAL at 10:12

## 2024-02-24 RX ADMIN — ASPIRIN 81 MG: 81 TABLET, COATED ORAL at 10:12

## 2024-02-24 RX ADMIN — FUROSEMIDE 40 MG: 40 TABLET ORAL at 10:13

## 2024-02-24 RX ADMIN — PANTOPRAZOLE SODIUM 40 MG: 40 TABLET, DELAYED RELEASE ORAL at 10:12

## 2024-02-24 RX ADMIN — AMIODARONE HYDROCHLORIDE 200 MG: 200 TABLET ORAL at 10:12

## 2024-02-24 RX ADMIN — FUROSEMIDE 40 MG: 40 TABLET ORAL at 16:25

## 2024-02-24 RX ADMIN — NYSTATIN: 100000 CREAM TOPICAL at 21:18

## 2024-02-24 RX ADMIN — LIDOCAINE 1 PATCH: 4 PATCH TOPICAL at 10:10

## 2024-02-24 RX ADMIN — Medication 37.5 MG: at 10:11

## 2024-02-24 RX ADMIN — ROSUVASTATIN CALCIUM 40 MG: 20 TABLET, FILM COATED ORAL at 21:18

## 2024-02-24 RX ADMIN — NYSTATIN: 100000 CREAM TOPICAL at 10:20

## 2024-02-24 RX ADMIN — SENNOSIDES AND DOCUSATE SODIUM 1 TABLET: 8.6; 5 TABLET ORAL at 10:12

## 2024-02-24 RX ADMIN — Medication 1 TABLET: at 10:12

## 2024-02-24 ASSESSMENT — ACTIVITIES OF DAILY LIVING (ADL)
ADLS_ACUITY_SCORE: 30
ADLS_ACUITY_SCORE: 29
ADLS_ACUITY_SCORE: 29
ADLS_ACUITY_SCORE: 30
ADLS_ACUITY_SCORE: 29
ADLS_ACUITY_SCORE: 29
ADLS_ACUITY_SCORE: 30
ADLS_ACUITY_SCORE: 29
ADLS_ACUITY_SCORE: 30
ADLS_ACUITY_SCORE: 29
ADLS_ACUITY_SCORE: 30
ADLS_ACUITY_SCORE: 29
ADLS_ACUITY_SCORE: 29
ADLS_ACUITY_SCORE: 30
ADLS_ACUITY_SCORE: 29
ADLS_ACUITY_SCORE: 25
ADLS_ACUITY_SCORE: 29
ADLS_ACUITY_SCORE: 30
ADLS_ACUITY_SCORE: 29

## 2024-02-24 NOTE — PLAN OF CARE
Discharge Planner Post-Acute Rehab OT:     Discharge Plan: home with assist for heavy IADLs, OP CR    Precautions: fall, sternal, O2 for activity    Current Status:  ADLs:  Mobility: SBA without AD in room, SBA with 4WW for hallway  Grooming: SBA  Dressing: UB: Mod A long sleeve button up shirt LB: Mod A Pants Footwear: Min A with use of reacher,sock aid  Bathing: Min A seated on bench with hand held shower head  Toileting: SBA with grab bar  IADLs: completes cooking, grocery shopping, driving at baseline, anticipate will need increased assist with heavy IADLs at discharge  Vision/Cognition: glasses + double vision at baseline, ACE III score 86/100--mild memory impairments    Assessment: shower asssessment completed. Current functional status updated.    Other Barriers to Discharge (DME, Family Training, etc): TBD  Reports daughter has ordered shower chair

## 2024-02-24 NOTE — PLAN OF CARE
Discharge Planner Post-Acute Rehab PT:     Discharge Plan: home w/ spouse, adult children    Precautions: falls, sternal    Current Status:  Bed Mobility: min A with bed flat and rail used  Transfer: supervision  Gait: no device in room, CGA; 4ww hallway w/ O2, CGA/SBA  Stairs: 12 steps with B rails on 6 inch steps, supervision if step to descent, min A if step through  Balance: stands w/o UE support    Outcome Measures:   Gait speed 2/23:        -w/o device: 0.3 m/s       -w/ 4ww: 0.43 m/s  6 Minute Walk Test on Feb 24: 107 m    Assessment: Overall, session tolerated well. Pt on 2L oxygen during activity, he has good breathing strategies.     Other Barriers to Discharge (DME, Family Training, etc):   Stairs to access bathroom/shower

## 2024-02-24 NOTE — PLAN OF CARE
"  VS: /57   Pulse 90   Temp 98.2  F (36.8  C) (Oral)   Resp 16   Ht 1.778 m (5' 10\")   Wt 107.3 kg (236 lb 8.9 oz)   SpO2 94%   BMI 33.94 kg/m     O2: Stable on 2L NC, denied SOB & CP   Output: Spontaneously voids to urinal or bathroom without difficulty   Last BM: 2/23/24, continent   Activity: A1 w/walker & gait belt   Skin: No concerns   Pain: Denied   CMS: A/O x4, baseline numbness to LE   Dressing: None   Diet: Reg/thin/whole   LDA: None   Plan: Discharge TBD pending progress   Additional Info:      Problem: Rehabilitation ([facility]) Plan of Care  Goal: Plan of Care Review  Outcome: Progressing  Flowsheets (Taken 2/24/2024 1105)  Outcome Evaluation: See progress note.  Plan of Care Reviewed With: patient  Overall Patient Progress: improving  Goal: Patient-Specific Goal (Individualized)  Description: You can add care plan individualizations to a care plan. Examples of Individualization might be:  \"Parent requests to be called daily at 9am for status\", \"I have a hard time hearing out of my right ear\", or \"Do not touch me to wake me up as it startles  me\".  Outcome: Progressing  Goal: Home and Community Transition Plan Established  Outcome: Progressing     Problem: Physician Care Plan Review  Goal: Physician Review  Outcome: Progressing     Problem: Individualization  Goal: Patient Individualization  Outcome: Progressing  Goal: 2. Patient Specific Individualization  Outcome: Progressing  Goal: 3. Patient Specific Individualization  Outcome: Progressing  Goal: 4. Patient Specific Individualization  Outcome: Progressing  Goal: 5. Patient Specific Individualization  Outcome: Progressing  Goal: Patient Preferences  Outcome: Progressing     Problem: Comorbidity Management  Goal: Blood Pressure in Desired Range  Outcome: Progressing  Intervention: Maintain Blood Pressure Management  Flowsheets  Taken 2/24/2024 1105  Medication Review/Management: medications reviewed  Taken 2/24/2024 1057  Medication " Review/Management: medications reviewed     Problem: Patient Care: Nursing Focus  Goal: Medication Management  Outcome: Progressing  Goal: Skin Integrity  Outcome: Progressing  Goal: Safety Management  Outcome: Progressing   Goal Outcome Evaluation:      Plan of Care Reviewed With: patient    Overall Patient Progress: improvingOverall Patient Progress: improving    Outcome Evaluation: See progress note.

## 2024-02-24 NOTE — PLAN OF CARE
"No acute changes or concerns during this NOC shift. Patient c/o pain, PRN administered, no SOB or cough noted. No further concerns, continue with POC.     Patient's most recent vitals:  /59   Pulse 79   Temp 98  F (36.7  C) (Oral)   Resp 16   Ht 1.778 m (5' 10\")   Wt 107.3 kg (236 lb 8.9 oz)   SpO2 92%   BMI 33.94 kg/m     "

## 2024-02-24 NOTE — PROGRESS NOTES
"  [facility]   Acute Rehabilitation Unit    INTERVAL HISTORY  Hai is a 79 yo M who reports feeling well this morning. He denies any difficulty sleeping and denies other acute concerns.    Last BM 2/23 per nursing note, no recent BM found in flowsheet  Denies chest pain, abdominal pain, shortness of breath. No further concerns.    Functionally,  With PT: 6-minute walk test with 107 m    With OT: Standby assist with out assistive device in room, stenosis with four-wheel walker in hallway, standby assist with grooming        MEDICATIONS  Scheduled meds   amiodarone  200 mg Oral Daily    apixaban ANTICOAGULANT  5 mg Oral BID    aspirin  81 mg Oral Daily    furosemide  40 mg Oral BID    lidocaine  1 patch Transdermal Q24H    metoprolol tartrate  37.5 mg Oral BID    multivitamin w/minerals  1 tablet Oral Daily    nystatin   Topical BID    pantoprazole  40 mg Oral QAM AC    rosuvastatin  40 mg Oral At Bedtime    senna-docusate  1 tablet Oral BID       PRN meds:  acetaminophen, bisacodyl, guaiFENesin, ipratropium - albuterol 0.5 mg/2.5 mg/3 mL, magnesium hydroxide, nitroGLYcerin, - MEDICATION INSTRUCTIONS -, polyethylene glycol      PHYSICAL EXAM  /63 (BP Location: Left arm, Patient Position: Semi-Juarez's)   Pulse 73   Temp 97.6  F (36.4  C) (Oral)   Resp 16   Ht 1.778 m (5' 10\")   Wt 107.3 kg (236 lb 8.9 oz)   SpO2 96%   BMI 33.94 kg/m    Gen: awake, alert  HEENT: EOMI  Cardio: RRR, no murmur  Pulm: on room air, lungs CTA bilaterally  GI: soft, nontender, nondistended  Neuro/MSK: moves bilateral upper extremities freely  Skin: noted bruises at inner thigh areas    LABS  No results found for this or any previous visit (from the past 24 hour(s)).    ASSESSMENT AND PLAN  Nick Zaldivar is a 78 year old man with past medical history of HTN, HLD, aortic stenosis, obesity, heart failure with preserved EF and CAD who was admitted for planned CABG & AVR, underwent 3 vessel " coronary artery bypass 2/14/24 valve replacement was unable to be performed. Hospital course complicated by hypervolemia, atelectasis, acute blood loss anemia, and A-fib.  Functionally noted to have impaired strength, impaired balance, and impaired activity tolerance, he was admitted to rehab 2/22/24.         Admission to acute inpatient rehab s/p CABG x 3.    Impairment group code: 9    - Vitals stable. No labs today.  - Continue ongoing medical management.  - Bruise on his inner/medical thigh areas due to the procedure; will apply heat to promote healing.   - Encouraged IS use to help with weaning supplemental O2 and added a new order for nursing   - Continue therapies and plan of care.  - Refer to last progress note from primary team for full problems list.      Seen and discussed with Dr. Summers, PM&R staff physician     Marissa Dexter DO  PGY-3  Physical Medicine and Rehabilitation        I, Alejandra Summers, saw this patient with my resident Dr. Dexter and agree with her findings and plan of care as documented in her note.     I personally reviewed the chart (vitals signs, medications, and labs).     My key findings and lopez manegement decisions made by me:   Doing very well. Abl to ambulate with no assistive device and SBA in his room and with FWW for outside his room and in the gym. Already working on stairs and doing well.     Alejandra Summers MD  Physical Medicine & Rehabilitation

## 2024-02-25 ENCOUNTER — APPOINTMENT (OUTPATIENT)
Dept: OCCUPATIONAL THERAPY | Facility: CLINIC | Age: 79
DRG: 949 | End: 2024-02-25
Attending: PHYSICAL MEDICINE & REHABILITATION
Payer: MEDICARE

## 2024-02-25 ENCOUNTER — APPOINTMENT (OUTPATIENT)
Dept: PHYSICAL THERAPY | Facility: CLINIC | Age: 79
DRG: 949 | End: 2024-02-25
Attending: PHYSICAL MEDICINE & REHABILITATION
Payer: MEDICARE

## 2024-02-25 PROCEDURE — 97530 THERAPEUTIC ACTIVITIES: CPT | Mod: GO

## 2024-02-25 PROCEDURE — 128N000003 HC R&B REHAB

## 2024-02-25 PROCEDURE — 97530 THERAPEUTIC ACTIVITIES: CPT | Mod: GP

## 2024-02-25 PROCEDURE — 250N000013 HC RX MED GY IP 250 OP 250 PS 637: Performed by: PHYSICAL MEDICINE & REHABILITATION

## 2024-02-25 PROCEDURE — 97535 SELF CARE MNGMENT TRAINING: CPT | Mod: GO

## 2024-02-25 PROCEDURE — 97110 THERAPEUTIC EXERCISES: CPT | Mod: GO

## 2024-02-25 PROCEDURE — 250N000013 HC RX MED GY IP 250 OP 250 PS 637: Performed by: PHYSICIAN ASSISTANT

## 2024-02-25 RX ADMIN — Medication 1 TABLET: at 08:39

## 2024-02-25 RX ADMIN — LIDOCAINE 1 PATCH: 4 PATCH TOPICAL at 08:38

## 2024-02-25 RX ADMIN — Medication 37.5 MG: at 08:39

## 2024-02-25 RX ADMIN — ROSUVASTATIN CALCIUM 40 MG: 20 TABLET, FILM COATED ORAL at 21:22

## 2024-02-25 RX ADMIN — FUROSEMIDE 40 MG: 40 TABLET ORAL at 08:40

## 2024-02-25 RX ADMIN — NYSTATIN: 100000 CREAM TOPICAL at 08:45

## 2024-02-25 RX ADMIN — APIXABAN 5 MG: 5 TABLET, FILM COATED ORAL at 08:40

## 2024-02-25 RX ADMIN — PANTOPRAZOLE SODIUM 40 MG: 40 TABLET, DELAYED RELEASE ORAL at 06:36

## 2024-02-25 RX ADMIN — Medication 37.5 MG: at 21:21

## 2024-02-25 RX ADMIN — ASPIRIN 81 MG: 81 TABLET, COATED ORAL at 08:39

## 2024-02-25 RX ADMIN — SENNOSIDES AND DOCUSATE SODIUM 1 TABLET: 8.6; 5 TABLET ORAL at 08:40

## 2024-02-25 RX ADMIN — APIXABAN 5 MG: 5 TABLET, FILM COATED ORAL at 21:22

## 2024-02-25 RX ADMIN — AMIODARONE HYDROCHLORIDE 200 MG: 200 TABLET ORAL at 08:40

## 2024-02-25 RX ADMIN — SENNOSIDES AND DOCUSATE SODIUM 1 TABLET: 8.6; 5 TABLET ORAL at 21:21

## 2024-02-25 RX ADMIN — NYSTATIN: 100000 CREAM TOPICAL at 21:24

## 2024-02-25 ASSESSMENT — ACTIVITIES OF DAILY LIVING (ADL)
ADLS_ACUITY_SCORE: 25

## 2024-02-25 NOTE — PLAN OF CARE
Goal Outcome Evaluation:      Plan of Care Reviewed With: patient    Overall Patient Progress: no change    Outcome Evaluation: pt. alert orientedx4. Calls appropriately. Continent of bowel and bladder LBM 2/25/24. Denies pain SOB and chest pain on O2 @ 1LPM via nasla cannula. Participates with cares and therapy. Sternal and old chest tube incision open to air. Scheduled diuretics not given, below order parameters, pt. Denied headache, dizziness and lightheadedness, encouraged to increase fluid intake. Safety measures in place. Call light within reach. Bed alarm on. Continue to monitor the pt.

## 2024-02-25 NOTE — PLAN OF CARE
Goal Outcome Evaluation:      Plan of Care Reviewed With: patient    Overall Patient Progress: no changeOverall Patient Progress: no change    Outcome Evaluation: patient alert and oriented x4. Able to make needs known, call light within reach.Patient on 2 L of oxygen via NC. Patient slept most of the shift. Patient SBA in room. Patient is [place]. Continent of bowel and bladder, last BM 2/24. Voiding without difficulty, uses the urinal. Sternal incision intact and NILS, and old chest tube sites also NILS, mepilex on right ankle wound.      Continue with plan of care.

## 2024-02-25 NOTE — PLAN OF CARE
Discharge Planner Post-Acute Rehab PT:     Discharge Plan: home w/ spouse, adult children    Precautions: falls, sternal    Current Status:  Bed Mobility: min A with bed flat and rail used  Transfer: supervision  Gait: no device in room, CGA; 4ww hallway w/ O2, CGA/SBA  Stairs: 12 steps with B rails on 6 inch steps, supervision if step to descent, min A if step through  Balance: stands w/o UE support    Outcome Measures:   Gait speed 2/23:        -w/o device: 0.3 m/s       -w/ 4ww: 0.43 m/s  6 Minute Walk Test on Feb 24: 107 m  HILL on Feb 25 44/56    Assessment: Overall, session emphasis on standing/walking/stair endurance with transition to sitting once fatigued. Tolerated well with rest breaks and water to address dry mouth.     PM Session: Completed HILL at 44/56 indicating moderate fall risk    Other Barriers to Discharge (DME, Family Training, etc):   Stairs to access bathroom/shower

## 2024-02-25 NOTE — PLAN OF CARE
Goal Outcome Evaluation:      Plan of Care Reviewed With: patient    Overall Patient Progress: improvingOverall Patient Progress: improving    Outcome Evaluation: Pt alert and oriented x4, [place].  VSS. O2 sats 96-97% on O2 2L/NC. Pt used IS with encouragement.   SBA for mobility in room. SBA w/ walker for hallway.  Denies pain or chest pain.   Continent of bowel and bladder, LBM 2/24  Sternal incision w/ liquid bandage approximated, NILS  Old chest tube sites has erythema, no drainage, NILS.   R lateral ankle incision healing, new dressing applied.  Bed alarm on. Continue with POC.

## 2024-02-25 NOTE — PLAN OF CARE
Individualized Overall Plan Of Care (IOPOC)      Rehab diagnosis/Impairment Group Code: 09 cardiac -  s/p cabgx 3 due to history of cad and aortic stenosis  S/p cabg (coronary artery bypass graft)       Expected functional outcome: anticipate improvement to mod I level with basic mobility and ADLs    Clinical Impression Comments:   Mobility: acute on chronic deconditioning due to CHD, hospitalization; will benefit from skilled PT so he can return home w/ his family.    ADL: Pt will benefit from continued skilled OT services to progress IND and safety with ADLs/IADLs.      Intensity of therapy:   PT 90 minutes, 6 times/week, for 10 days  OT 90 minutes, 6 times/week, for 10 days    Orthotics  none for now  Education anticoagulation, wound care, and sternal precautions  Neuropsychology Testing: No      Medical Prognosis: good medical prognosis       Physician summary statement: he is overall doing well. Poor endurance and stairs are main barriers for discharge.     Discharge destination: prior home and family  Discharge rehabilitation needs: home care, RN, PT, and OT      Estimated length of stay: 10 days       Rehabilitation Physician Alejandra Summers MD

## 2024-02-25 NOTE — PLAN OF CARE
Discharge Planner Post-Acute Rehab OT:     Discharge Plan: home with assist for heavy IADLs, OP CR    Precautions: fall, sternal, O2 for activity    Current Status:  ADLs:  Mobility: SBA without AD in room, SBA with 4WW for hallway  Grooming: SBA  Dressing: UB: IND short sleeve button up shirt LB: Min A Pants Footwear: Min A with use of reacher,sock aid  Bathing: Min A seated on bench with hand held shower head  Toileting: SBA with grab bar  IADLs: completes cooking, grocery shopping, driving at baseline, anticipate will need increased assist with heavy IADLs at discharge  Vision/Cognition: glasses + double vision at baseline, ACE III score 86/100--mild memory impairments    Assessment: pt progressing with dressing tasks, current status updated.    Other Barriers to Discharge (DME, Family Training, etc): TBD  Reports daughter has ordered shower chair

## 2024-02-26 ENCOUNTER — APPOINTMENT (OUTPATIENT)
Dept: OCCUPATIONAL THERAPY | Facility: CLINIC | Age: 79
DRG: 949 | End: 2024-02-26
Attending: PHYSICAL MEDICINE & REHABILITATION
Payer: MEDICARE

## 2024-02-26 ENCOUNTER — APPOINTMENT (OUTPATIENT)
Dept: PHYSICAL THERAPY | Facility: CLINIC | Age: 79
DRG: 949 | End: 2024-02-26
Attending: PHYSICAL MEDICINE & REHABILITATION
Payer: MEDICARE

## 2024-02-26 LAB
ANION GAP SERPL CALCULATED.3IONS-SCNC: 5 MMOL/L (ref 7–15)
BUN SERPL-MCNC: 20.9 MG/DL (ref 8–23)
CALCIUM SERPL-MCNC: 8.5 MG/DL (ref 8.8–10.2)
CHLORIDE SERPL-SCNC: 100 MMOL/L (ref 98–107)
CREAT SERPL-MCNC: 1.08 MG/DL (ref 0.67–1.17)
DEPRECATED HCO3 PLAS-SCNC: 31 MMOL/L (ref 22–29)
EGFRCR SERPLBLD CKD-EPI 2021: 70 ML/MIN/1.73M2
ERYTHROCYTE [DISTWIDTH] IN BLOOD BY AUTOMATED COUNT: 15.9 % (ref 10–15)
GLUCOSE SERPL-MCNC: 92 MG/DL (ref 70–99)
HCT VFR BLD AUTO: 29.8 % (ref 40–53)
HGB BLD-MCNC: 9.1 G/DL (ref 13.3–17.7)
MAGNESIUM SERPL-MCNC: 2.4 MG/DL (ref 1.7–2.3)
MCH RBC QN AUTO: 29.6 PG (ref 26.5–33)
MCHC RBC AUTO-ENTMCNC: 30.5 G/DL (ref 31.5–36.5)
MCV RBC AUTO: 97 FL (ref 78–100)
PLATELET # BLD AUTO: 478 10E3/UL (ref 150–450)
POTASSIUM SERPL-SCNC: 4.3 MMOL/L (ref 3.4–5.3)
RBC # BLD AUTO: 3.07 10E6/UL (ref 4.4–5.9)
SODIUM SERPL-SCNC: 136 MMOL/L (ref 135–145)
WBC # BLD AUTO: 9.9 10E3/UL (ref 4–11)

## 2024-02-26 PROCEDURE — 80048 BASIC METABOLIC PNL TOTAL CA: CPT | Performed by: PHYSICIAN ASSISTANT

## 2024-02-26 PROCEDURE — 99231 SBSQ HOSP IP/OBS SF/LOW 25: CPT | Performed by: PHYSICAL MEDICINE & REHABILITATION

## 2024-02-26 PROCEDURE — 97530 THERAPEUTIC ACTIVITIES: CPT | Mod: GO

## 2024-02-26 PROCEDURE — 250N000013 HC RX MED GY IP 250 OP 250 PS 637: Performed by: PHYSICIAN ASSISTANT

## 2024-02-26 PROCEDURE — 83735 ASSAY OF MAGNESIUM: CPT | Performed by: PHYSICIAN ASSISTANT

## 2024-02-26 PROCEDURE — 36415 COLL VENOUS BLD VENIPUNCTURE: CPT | Performed by: PHYSICIAN ASSISTANT

## 2024-02-26 PROCEDURE — 250N000013 HC RX MED GY IP 250 OP 250 PS 637: Performed by: PHYSICAL MEDICINE & REHABILITATION

## 2024-02-26 PROCEDURE — 97535 SELF CARE MNGMENT TRAINING: CPT | Mod: GO

## 2024-02-26 PROCEDURE — 97110 THERAPEUTIC EXERCISES: CPT | Mod: GO

## 2024-02-26 PROCEDURE — 128N000003 HC R&B REHAB

## 2024-02-26 PROCEDURE — 97110 THERAPEUTIC EXERCISES: CPT | Mod: GP

## 2024-02-26 PROCEDURE — 85027 COMPLETE CBC AUTOMATED: CPT | Performed by: PHYSICIAN ASSISTANT

## 2024-02-26 RX ADMIN — LIDOCAINE 1 PATCH: 4 PATCH TOPICAL at 09:07

## 2024-02-26 RX ADMIN — NYSTATIN: 100000 CREAM TOPICAL at 10:15

## 2024-02-26 RX ADMIN — APIXABAN 5 MG: 5 TABLET, FILM COATED ORAL at 20:18

## 2024-02-26 RX ADMIN — ASPIRIN 81 MG: 81 TABLET, COATED ORAL at 09:09

## 2024-02-26 RX ADMIN — AMIODARONE HYDROCHLORIDE 200 MG: 200 TABLET ORAL at 09:09

## 2024-02-26 RX ADMIN — Medication 1 TABLET: at 09:09

## 2024-02-26 RX ADMIN — NYSTATIN: 100000 CREAM TOPICAL at 20:24

## 2024-02-26 RX ADMIN — SENNOSIDES AND DOCUSATE SODIUM 1 TABLET: 8.6; 5 TABLET ORAL at 09:10

## 2024-02-26 RX ADMIN — Medication 37.5 MG: at 20:18

## 2024-02-26 RX ADMIN — FUROSEMIDE 40 MG: 40 TABLET ORAL at 09:09

## 2024-02-26 RX ADMIN — ROSUVASTATIN CALCIUM 40 MG: 20 TABLET, FILM COATED ORAL at 20:18

## 2024-02-26 RX ADMIN — APIXABAN 5 MG: 5 TABLET, FILM COATED ORAL at 09:09

## 2024-02-26 RX ADMIN — SENNOSIDES AND DOCUSATE SODIUM 1 TABLET: 8.6; 5 TABLET ORAL at 20:18

## 2024-02-26 RX ADMIN — PANTOPRAZOLE SODIUM 40 MG: 40 TABLET, DELAYED RELEASE ORAL at 06:51

## 2024-02-26 RX ADMIN — Medication 37.5 MG: at 09:09

## 2024-02-26 RX ADMIN — FUROSEMIDE 40 MG: 40 TABLET ORAL at 17:03

## 2024-02-26 ASSESSMENT — ACTIVITIES OF DAILY LIVING (ADL)
ADLS_ACUITY_SCORE: 25

## 2024-02-26 NOTE — PLAN OF CARE
"Goal Outcome Evaluation:  Pt. Is A/Ox4 able to make needs known. Call light within reach. No acute changes on this shift. Continue  POC.    Recent VS:  /61 (BP Location: Right arm)   Pulse 70   Temp 98.2  F (36.8  C) (Oral)   Resp 16   Ht 1.778 m (5' 10\")   Wt 104.9 kg (231 lb 4.2 oz)   SpO2 96%   BMI 33.18 kg/m     "

## 2024-02-26 NOTE — PLAN OF CARE
Discharge Planner Post-Acute Rehab OT:     Discharge Plan: home with assist for heavy IADLs, OP CR    Precautions: fall, sternal, O2 for activity    Current Status:  ADLs:  Mobility: SBA without AD in room, SBA with 4WW for hallway  Grooming: SBA  Dressing: UB: IND short sleeve button up shirt LB: Min A Pants Footwear: Min A with use of reacher,sock aid  Bathing: Min A seated on bench with hand held shower head  Toileting: SBA with grab bar  IADLs: completes cooking, grocery shopping, driving at baseline, anticipate will need increased assist with heavy IADLs at discharge  Vision/Cognition: glasses + double vision at baseline, ACE III score 86/100--mild memory impairments    Assessment: Focused on standing tolerance during g/h as well as UB exercises. Pt still experiencing SOB and fatigue during activity requiring seated rest.     Other Barriers to Discharge (DME, Family Training, etc): TBD  Reports daughter has ordered shower chair

## 2024-02-26 NOTE — PROGRESS NOTES
"  [facility]   Acute Rehabilitation Unit  Daily progress note    INTERVAL HISTORY  Nick Zaldivar was seen and examined at bedside. No acute events overngiht.  Denies chest pain, shortness of breath, no fever or chills.  Had BM this afternoon.  Doing well with therapy program.  Continue to monitor fluid and increase protein intake.  Discussed rehab goals and will have updates this week for discharge needs.       Functional  PT:  Bed Mobility: min A with bed flat and rail used  Transfer: supervision  Gait: no device in room, CGA; 4ww hallway w/ O2, CGA/SBA  Stairs: 12 steps with B rails on 6 inch steps, supervision if step to descent, min A if step through  Balance: stands w/o UE support     Outcome Measures:   Gait speed 2/23:        -w/o device: 0.3 m/s       -w/ 4ww: 0.43 m/s  6 Minute Walk Test on Feb 24: 107 m  HILL on Feb 25 44/56    ROS: 10 point ROS neg other than the symptoms noted above in the HPI.          MEDICATIONS  Scheduled meds   amiodarone  200 mg Oral Daily    apixaban ANTICOAGULANT  5 mg Oral BID    aspirin  81 mg Oral Daily    furosemide  40 mg Oral BID    lidocaine  1 patch Transdermal Q24H    metoprolol tartrate  37.5 mg Oral BID    multivitamin w/minerals  1 tablet Oral Daily    nystatin   Topical BID    pantoprazole  40 mg Oral QAM AC    rosuvastatin  40 mg Oral At Bedtime    senna-docusate  1 tablet Oral BID       PRN meds:  acetaminophen, bisacodyl, guaiFENesin, ipratropium - albuterol 0.5 mg/2.5 mg/3 mL, magnesium hydroxide, nitroGLYcerin, - MEDICATION INSTRUCTIONS -, polyethylene glycol      PHYSICAL EXAM  /61 (BP Location: Right arm)   Pulse 70   Temp 98.2  F (36.8  C) (Oral)   Resp 16   Ht 1.778 m (5' 10\")   Wt 104.9 kg (231 lb 4.2 oz)   SpO2 96%   BMI 33.18 kg/m    Gen: NAD, up at edge of bed unsupported - stable   HEENT: NCAT, EOMI, wearing NC O2  Cardio: 2+ radial pulse, well perfused  Pulm: nonlabored, symmetrical chest rise  Abd: " protuberant, nontender  Ext: there is edema in B/L LE, non tender, Bandage on RLE medail distal leg CDI.  Diminished sensation in RLE.    Neuro/MSK: AAOx3, full AROM thoughout     LABS    Lab Results   Component Value Date    WBC 10.5 02/20/2024     Lab Results   Component Value Date    RBC 3.02 02/20/2024     Lab Results   Component Value Date    HGB 9.0 02/20/2024     Lab Results   Component Value Date    HCT 28.2 02/20/2024     Lab Results   Component Value Date    MCV 93 02/20/2024     Lab Results   Component Value Date    MCH 29.8 02/20/2024     Lab Results   Component Value Date    MCHC 31.9 02/20/2024     Lab Results   Component Value Date    RDW 14.0 02/20/2024     Lab Results   Component Value Date     02/21/2024     Last Comprehensive Metabolic Panel:  Lab Results   Component Value Date     02/26/2024    POTASSIUM 4.3 02/26/2024    CHLORIDE 100 02/26/2024    CO2 31 (H) 02/26/2024    ANIONGAP 5 (L) 02/26/2024    GLC 92 02/26/2024    BUN 20.9 02/26/2024    CR 1.08 02/26/2024    GFRESTIMATED 70 02/26/2024    BUBBA 8.5 (L) 02/26/2024           ASSESSMENT AND PLAN  Nick Zaldivar is a 78 year old man with past medical history of HTN, HLD, aortic stenosis, obesity, heart failure with preserved EF and CAD who was admitted for planned CABG & AVR, underwent 3 vessel coronary artery bypass 2/14/24 valve replacement was unable to be performed. Hospital course complicated by hypervolemia, atelectasis, acute blood loss anemia, and A-fib.  Functionally noted to have impaired strength, impaired balance, and impaired activity tolerance, he was admitted to rehab 2/22/24.         Admission to acute inpatient rehab s/p CABG x 3.    Impairment group code: 9        PT, OT 90 minutes of each on a daily basis up to 6 days per week, in addition to rehab nursing and close management of physiatrist.       Impairment of ADL's: Noted to have impaired strength, impaired activity tolerance, and impaired balance leading to  decreased ability to independently complete ADL's.  Will benefit from ongoing OT with goal for MOD I with basic ADLs.      Impairment of mobility:  Noted to have impaired strength, impaired activity tolerance, and impaired balance leading to decreased mobility.  Will benefit from ongoing PT with goal for BHARATH with basic mobility.      Medical Conditions  CAD s/p CABG x 3    Completed 2/14/24 per Dr. Flanagan.   -sternal precautions x 8 weeks  -cleanse incision daily  -had 2 staples left on the right distal leg area - asked the nursing team to remove, continue monitor site  -asa 81  -continue crestor  -follow up cv surgery     Post operative A-fib  -started on amiodarone tapered down and admitted to aru on 200 mg daily x 21 days  -continue apixaban  -continue metoprolol  -follow up Dr. Michael as scheduled.      HF preserved EF  -continue metoprolol 37.5 mg bid  -continue lasix 40 mg bid (home dose)  -monitor weight and volume status daily      Hypoxic respiratory failure  Atelectasis  -mucinex bid prn  -duo nebs bid prn   -encourage IS  -monitor oxygen level- oxygen prn to keep sats >90%     Acute blood loss anemia  Hgb 13.9 pre-op down to 8.9 2/16/24 stable 9.0 2/20.  -2/26: 9.1      Pre Diabetes  Hgb A1C 5.9%  -trend with primary care     Polyneuropathy ?   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 This can be due to DM vs PAD vs idiopathic vs combination. Should follow up with PCP for basic screening blood work (SPEP, protein ASHLIE, B12 with refelct MMA, B6, B1 - A1c and TSH were checked recently) and possible NCS/EMG. Needs education on excellent skin/nail monitoring and care to avoid future complications.        Adjustment to disability:  Clinical psychology to eval and treat as indicated  FEN: reg  Bowel: monitor; he is constipated so consider increasing senokot dose and scheduling miralax pending his course   Bladder: monitor  DVT Prophylaxis:  apixaban  GI Prophylaxis: PPI  Code: full - confirmed upon admission   Disposition: goal for home  ELOS:  one week to 10 days  - impaired endurance and stairs at home are main barriers. Might need regular rehab and then cardiac rehab due to poor endurance.    Rehab prognosis:  good  Follow up Appointments on Discharge: pcp, cardiology, cv surgery          De Horta, DO  Physical Medicine & Rehabilitation    I spent a total of  25 minutes face to face and coordinating care of Nick Zaldivar.  Over 50% of my time on the unit was spent counseling the patient and /or coordinating care regarding post cardiac surgery.

## 2024-02-26 NOTE — PLAN OF CARE
Goal Outcome Evaluation:      Plan of Care Reviewed With: patient    Overall Patient Progress: no change    Alert and oriented and uses the call light appropriately. Slightly [place]. On 2L oxygen per NC, sob noted with exertion and ambulation. Denies chest pains, N/V. Anterior chest incisions clean, dry and open to air. On regular texture diet with thin liquids. Takes pills well with water. Stand by assist with ambulation with no assistive device. Gait is steady and denies dizziness or lightheadedness. Bed alarm on at night. Slept good most of the night.

## 2024-02-27 ENCOUNTER — APPOINTMENT (OUTPATIENT)
Dept: OCCUPATIONAL THERAPY | Facility: CLINIC | Age: 79
DRG: 949 | End: 2024-02-27
Attending: PHYSICAL MEDICINE & REHABILITATION
Payer: MEDICARE

## 2024-02-27 ENCOUNTER — APPOINTMENT (OUTPATIENT)
Dept: PHYSICAL THERAPY | Facility: CLINIC | Age: 79
DRG: 949 | End: 2024-02-27
Attending: PHYSICAL MEDICINE & REHABILITATION
Payer: MEDICARE

## 2024-02-27 PROCEDURE — 97110 THERAPEUTIC EXERCISES: CPT | Mod: GP

## 2024-02-27 PROCEDURE — 99231 SBSQ HOSP IP/OBS SF/LOW 25: CPT | Performed by: PHYSICAL MEDICINE & REHABILITATION

## 2024-02-27 PROCEDURE — 128N000003 HC R&B REHAB

## 2024-02-27 PROCEDURE — 97110 THERAPEUTIC EXERCISES: CPT | Mod: GP | Performed by: PHYSICAL THERAPIST

## 2024-02-27 PROCEDURE — 97535 SELF CARE MNGMENT TRAINING: CPT | Mod: GO

## 2024-02-27 PROCEDURE — 250N000013 HC RX MED GY IP 250 OP 250 PS 637: Performed by: PHYSICAL MEDICINE & REHABILITATION

## 2024-02-27 PROCEDURE — 250N000013 HC RX MED GY IP 250 OP 250 PS 637: Performed by: PHYSICIAN ASSISTANT

## 2024-02-27 RX ADMIN — FUROSEMIDE 40 MG: 40 TABLET ORAL at 16:28

## 2024-02-27 RX ADMIN — ROSUVASTATIN CALCIUM 40 MG: 20 TABLET, FILM COATED ORAL at 20:52

## 2024-02-27 RX ADMIN — ASPIRIN 81 MG: 81 TABLET, COATED ORAL at 09:11

## 2024-02-27 RX ADMIN — LIDOCAINE 1 PATCH: 4 PATCH TOPICAL at 09:07

## 2024-02-27 RX ADMIN — Medication 37.5 MG: at 20:51

## 2024-02-27 RX ADMIN — NYSTATIN: 100000 CREAM TOPICAL at 09:11

## 2024-02-27 RX ADMIN — AMIODARONE HYDROCHLORIDE 200 MG: 200 TABLET ORAL at 09:11

## 2024-02-27 RX ADMIN — APIXABAN 5 MG: 5 TABLET, FILM COATED ORAL at 20:52

## 2024-02-27 RX ADMIN — Medication 37.5 MG: at 09:11

## 2024-02-27 RX ADMIN — APIXABAN 5 MG: 5 TABLET, FILM COATED ORAL at 09:11

## 2024-02-27 RX ADMIN — FUROSEMIDE 40 MG: 40 TABLET ORAL at 09:12

## 2024-02-27 RX ADMIN — Medication 1 TABLET: at 09:11

## 2024-02-27 RX ADMIN — PANTOPRAZOLE SODIUM 40 MG: 40 TABLET, DELAYED RELEASE ORAL at 06:35

## 2024-02-27 ASSESSMENT — ACTIVITIES OF DAILY LIVING (ADL)
ADLS_ACUITY_SCORE: 25
ADLS_ACUITY_SCORE: 26
ADLS_ACUITY_SCORE: 25
ADLS_ACUITY_SCORE: 26
ADLS_ACUITY_SCORE: 25
ADLS_ACUITY_SCORE: 25
ADLS_ACUITY_SCORE: 26
ADLS_ACUITY_SCORE: 25
ADLS_ACUITY_SCORE: 25
ADLS_ACUITY_SCORE: 26
ADLS_ACUITY_SCORE: 25
ADLS_ACUITY_SCORE: 25
ADLS_ACUITY_SCORE: 26
ADLS_ACUITY_SCORE: 25

## 2024-02-27 NOTE — PLAN OF CARE
Goal Outcome Evaluation:      Plan of Care Reviewed With: patient    Overall Patient Progress: improvingOverall Patient Progress: improving    Outcome Evaluation: Pt alert and oriented x4, [place]. VSS, O2 sats 93% on O2 1L/NC, IS encouraged. Denies pain, chest pain or SOB.   SBA without AD for mobility in room.   Sternal incision CDI, NILS.  Continent of bowel and bladder. LBM 2/26.   Pt participating in therapies.

## 2024-02-27 NOTE — PLAN OF CARE
Discharge Planner Post-Acute Rehab OT:     Discharge Plan: home with assist for heavy IADLs, OP CR    Precautions: fall, sternal, O2 for activity    Current Status:  ADLs:  Mobility: SBA without AD in room, SBA with 4WW for hallway  Grooming: SBA  Dressing: UB: IND short sleeve button up shirt LB: Min A Pants Footwear: Min A with use of reacher,sock aid, long handle shoe horn  Bathing: Min A seated on bench with hand held shower head  Toileting: SBA with grab bar  IADLs: completes cooking, grocery shopping, driving at baseline, anticipate will need increased assist with heavy IADLs at discharge  Vision/Cognition: glasses + double vision at baseline, ACE III score 86/100--mild memory impairments    Assessment:tx focus to progress ADLs, provided SBA for room mobility no device and navigating furniture in room safely. Pt completed clothing retrieval, voided in toilet in standing, and g/h standing at sink. MIn A doffing pants d/t tight around ankles but able to don looser pants SBA, and IND with shirt. Trialed various sock aides with pts socks, pt ultimately required assist to get sock fully onto sock aides. May trial different socks in future tx. Introduced long hand shoe horn and pt able to don slippers with backs w/extra time. Extra time for tasks for rest breaks PRN. Pt with dyspnea during ADLs but 02 sats on room air consistently >90% during tx.     PM: Session focused on completion of family training with daughter regarding assist needed for ADLs/IADLs. Daughter with questions regarding updating discharge day for earlier. Discussed with team and will target 3/1 or 3/2. Educated on shower tx, AE for LB dressing. Pt demonstrating good safety awareness, O2 >93% throughout on RA.    Other Barriers to Discharge (DME, Family Training, etc):   Family training completed 2/27.  Has shower chair. Daughter provided with handout on reacher, sock aid, long handled sponge and reports will obtain.

## 2024-02-27 NOTE — PLAN OF CARE
Goal Outcome Evaluation:      Plan of Care Reviewed With: patient    Overall Patient Progress: no change    Outcome Evaluation: Pt. alert oriented x4, moderately difficult of hearing, but able to verbalized needs. Continent of bowel and bladder LBM 2/26/24 per report. On regular thin diet with good appetite. Sternal incision NILS approximated. SOB on exertion per baseline O2 @1.5 LPM via nasal cannula. Denies pain nausea and vomiting. Slept in between cares. Call light within reach. Bed alarm on. Continue with current POC.

## 2024-02-27 NOTE — PROGRESS NOTES
"  [facility]   Acute Rehabilitation Unit  Daily progress note    INTERVAL HISTORY  Nick Zaldivar was seen and examined at bedside. No acute events overnight.  Denies chest pain, shortness of breath, no fever or chills.  Still with some watery like bowel movements, but no abdominal pains.  Good therapy participation.  Continue to monitor weight and encourage protein intake.       Functional  OT:  ADLs:  Mobility: SBA without AD in room, SBA with 4WW for hallway  Grooming: SBA  Dressing: UB: IND short sleeve button up shirt LB: Min A Pants Footwear: Min A with use of reacher,sock aid, long handle shoe horn  Bathing: Min A seated on bench with hand held shower head  Toileting: SBA with grab bar  IADLs: completes cooking, grocery shopping, driving at baseline, anticipate will need increased assist with heavy IADLs at discharge  Vision/Cognition: glasses + double vision at baseline, ACE III score 86/100--mild memory impairments    ROS: 10 point ROS neg other than the symptoms noted above in the HPI.          MEDICATIONS  Scheduled meds   amiodarone  200 mg Oral Daily    apixaban ANTICOAGULANT  5 mg Oral BID    aspirin  81 mg Oral Daily    furosemide  40 mg Oral BID    lidocaine  1 patch Transdermal Q24H    metoprolol tartrate  37.5 mg Oral BID    multivitamin w/minerals  1 tablet Oral Daily    nystatin   Topical BID    pantoprazole  40 mg Oral QAM AC    rosuvastatin  40 mg Oral At Bedtime    senna-docusate  1 tablet Oral BID       PRN meds:  acetaminophen, bisacodyl, guaiFENesin, ipratropium - albuterol 0.5 mg/2.5 mg/3 mL, magnesium hydroxide, nitroGLYcerin, - MEDICATION INSTRUCTIONS -, polyethylene glycol      PHYSICAL EXAM  /65 (BP Location: Left arm, Patient Position: Supine)   Pulse 77   Temp 98.3  F (36.8  C) (Oral)   Resp 16   Ht 1.778 m (5' 10\")   Wt 106.5 kg (234 lb 12.6 oz)   SpO2 93%   BMI 33.69 kg/m    Gen: NAD, resting in bed   HEENT: NCAT, EOMI, wearing NC " O2  Cardio: 2+ radial pulse, well perfused  Pulm: nonlabored, symmetrical chest rise  Abd: protuberant, nontender, + BS  Ext: there is edema in B/L LE, non tender, Bandage on RLE medail distal leg CDI.  Diminished sensation in RLE.    Neuro/MSK: AAOx3, full AROM thoughout     LABS    Lab Results   Component Value Date    WBC 10.5 02/20/2024     Lab Results   Component Value Date    RBC 3.02 02/20/2024     Lab Results   Component Value Date    HGB 9.0 02/20/2024     Lab Results   Component Value Date    HCT 28.2 02/20/2024     Lab Results   Component Value Date    MCV 93 02/20/2024     Lab Results   Component Value Date    MCH 29.8 02/20/2024     Lab Results   Component Value Date    MCHC 31.9 02/20/2024     Lab Results   Component Value Date    RDW 14.0 02/20/2024     Lab Results   Component Value Date     02/21/2024     Last Comprehensive Metabolic Panel:  Lab Results   Component Value Date     02/26/2024    POTASSIUM 4.3 02/26/2024    CHLORIDE 100 02/26/2024    CO2 31 (H) 02/26/2024    ANIONGAP 5 (L) 02/26/2024    GLC 92 02/26/2024    BUN 20.9 02/26/2024    CR 1.08 02/26/2024    GFRESTIMATED 70 02/26/2024    BUBBA 8.5 (L) 02/26/2024           ASSESSMENT AND PLAN  Nick Zaldivar is a 78 year old man with past medical history of HTN, HLD, aortic stenosis, obesity, heart failure with preserved EF and CAD who was admitted for planned CABG & AVR, underwent 3 vessel coronary artery bypass 2/14/24 valve replacement was unable to be performed. Hospital course complicated by hypervolemia, atelectasis, acute blood loss anemia, and A-fib.  Functionally noted to have impaired strength, impaired balance, and impaired activity tolerance, he was admitted to rehab 2/22/24.         Admission to acute inpatient rehab s/p CABG x 3.    Impairment group code: 9        PT, OT 90 minutes of each on a daily basis up to 6 days per week, in addition to rehab nursing and close management of physiatrist.       Impairment of  ADL's: Noted to have impaired strength, impaired activity tolerance, and impaired balance leading to decreased ability to independently complete ADL's.  Will benefit from ongoing OT with goal for MOD I with basic ADLs.      Impairment of mobility:  Noted to have impaired strength, impaired activity tolerance, and impaired balance leading to decreased mobility.  Will benefit from ongoing PT with goal for BHARATH with basic mobility.      Medical Conditions  CAD s/p CABG x 3    Completed 2/14/24 per Dr. Flanagan.   -sternal precautions x 8 weeks  -cleanse incision daily  -had 2 staples left on the right distal leg area - asked the nursing team to remove, continue monitor site  -asa 81  -continue crestor  -follow up cv surgery     Post operative A-fib  -started on amiodarone tapered down and admitted to aru on 200 mg daily x 21 days  -continue apixaban  -continue metoprolol  -follow up Dr. Michael as scheduled.      HF preserved EF  -continue metoprolol 37.5 mg bid  -continue lasix 40 mg bid (home dose)  -monitor weight and volume status daily      Hypoxic respiratory failure  Atelectasis  -mucinex bid prn  -duo nebs bid prn   -encourage IS  -monitor oxygen level- oxygen prn to keep sats >90%     Acute blood loss anemia  Hgb 13.9 pre-op down to 8.9 2/16/24 stable 9.0 2/20.  -2/26: 9.1      Pre Diabetes  Hgb A1C 5.9%  -trend with primary care     Polyneuropathy ?   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 This can be due to DM vs PAD vs idiopathic vs combination. Should follow up with PCP for basic screening blood work (SPEP, protein ASHLIE, B12 with refelct MMA, B6, B1 - A1c and TSH were checked recently) and possible NCS/EMG. Needs education on excellent skin/nail monitoring and care to avoid future complications.        Adjustment to disability:  Clinical psychology to eval and treat as indicated  FEN: reg   Bowel: monitor; he is constipated so consider  increasing senokot dose and scheduling miralax pending his course   Bladder: monitor  DVT Prophylaxis: apixaban  GI Prophylaxis: PPI  Code: full - confirmed upon admission   Disposition: goal for home  ELOS:  one week to 10 days  - impaired endurance and stairs at home are main barriers. Might need regular rehab and then cardiac rehab due to poor endurance.    Rehab prognosis:  good  Follow up Appointments on Discharge: pcp, cardiology, cv surgery          De Horta, DO  Physical Medicine & Rehabilitation    I spent a total of  25 minutes face to face and coordinating care of Nick Zaldivar.  Over 50% of my time on the unit was spent counseling the patient and /or coordinating care regarding post cardiac surgery.

## 2024-02-27 NOTE — PROGRESS NOTES
"  VS: /80 (BP Location: Left arm)   Pulse 70   Temp 98.6  F (37  C) (Oral)   Resp 18   Ht 1.778 m (5' 10\")   Wt 104.9 kg (231 lb 4.2 oz)   SpO2 93%   BMI 33.18 kg/m       O2: 93% on 1.5L  NC   Output: Urinating without difficulty    Last BM: 2/26/24   loose stool   Activity: SBA with gaitbelt, 4ww for long bouts   Up for meals? Yes   Skin: Chest incision with 2 sores inferior to incision from drainage wholes and both appear to be starting to scab over with slight redness surrounding O/A's   approx 2 mm   surrounding O/A with no drainage, no odor, denies pain.  Superficial scrap to RLE medial ankle DRSG is CDI   Pain: Denies at this time but has intermittent RLE pain   CMS: A&O x 2 did not know the exact date or  name of facility but did know that he was in hospital and that the month was Feb.  denies numbness's or tingling    Dressing: CDI    Diet: Regular/thin/ liquid   LDA: N/A   Equipment:    Plan: TBD   Additional Info: Pt. Is [place] but does not have hearing aid's    states that he normally only wears one hearing aid but he lost it a few weeks ago PTA     "

## 2024-02-28 ENCOUNTER — APPOINTMENT (OUTPATIENT)
Dept: PHYSICAL THERAPY | Facility: CLINIC | Age: 79
DRG: 949 | End: 2024-02-28
Attending: PHYSICAL MEDICINE & REHABILITATION
Payer: MEDICARE

## 2024-02-28 ENCOUNTER — APPOINTMENT (OUTPATIENT)
Dept: OCCUPATIONAL THERAPY | Facility: CLINIC | Age: 79
DRG: 949 | End: 2024-02-28
Attending: PHYSICAL MEDICINE & REHABILITATION
Payer: MEDICARE

## 2024-02-28 PROCEDURE — 97535 SELF CARE MNGMENT TRAINING: CPT | Mod: GO

## 2024-02-28 PROCEDURE — 128N000003 HC R&B REHAB

## 2024-02-28 PROCEDURE — 250N000013 HC RX MED GY IP 250 OP 250 PS 637: Performed by: PHYSICIAN ASSISTANT

## 2024-02-28 PROCEDURE — 97112 NEUROMUSCULAR REEDUCATION: CPT | Mod: GP

## 2024-02-28 PROCEDURE — 97530 THERAPEUTIC ACTIVITIES: CPT | Mod: GP

## 2024-02-28 PROCEDURE — 97110 THERAPEUTIC EXERCISES: CPT | Mod: GP

## 2024-02-28 PROCEDURE — 99231 SBSQ HOSP IP/OBS SF/LOW 25: CPT | Performed by: PHYSICAL MEDICINE & REHABILITATION

## 2024-02-28 PROCEDURE — 250N000013 HC RX MED GY IP 250 OP 250 PS 637: Performed by: PHYSICAL MEDICINE & REHABILITATION

## 2024-02-28 RX ADMIN — POLYETHYLENE GLYCOL 3350 17 G: 17 POWDER, FOR SOLUTION ORAL at 20:27

## 2024-02-28 RX ADMIN — APIXABAN 5 MG: 5 TABLET, FILM COATED ORAL at 20:20

## 2024-02-28 RX ADMIN — NYSTATIN: 100000 CREAM TOPICAL at 20:20

## 2024-02-28 RX ADMIN — SENNOSIDES AND DOCUSATE SODIUM 1 TABLET: 8.6; 5 TABLET ORAL at 20:20

## 2024-02-28 RX ADMIN — FUROSEMIDE 40 MG: 40 TABLET ORAL at 16:30

## 2024-02-28 RX ADMIN — Medication 1 TABLET: at 07:51

## 2024-02-28 RX ADMIN — Medication 37.5 MG: at 07:50

## 2024-02-28 RX ADMIN — ROSUVASTATIN CALCIUM 40 MG: 20 TABLET, FILM COATED ORAL at 20:20

## 2024-02-28 RX ADMIN — LIDOCAINE 1 PATCH: 4 PATCH TOPICAL at 07:51

## 2024-02-28 RX ADMIN — APIXABAN 5 MG: 5 TABLET, FILM COATED ORAL at 07:51

## 2024-02-28 RX ADMIN — SENNOSIDES AND DOCUSATE SODIUM 1 TABLET: 8.6; 5 TABLET ORAL at 07:51

## 2024-02-28 RX ADMIN — AMIODARONE HYDROCHLORIDE 200 MG: 200 TABLET ORAL at 07:51

## 2024-02-28 RX ADMIN — ASPIRIN 81 MG: 81 TABLET, COATED ORAL at 07:51

## 2024-02-28 RX ADMIN — NYSTATIN: 100000 CREAM TOPICAL at 08:01

## 2024-02-28 RX ADMIN — PANTOPRAZOLE SODIUM 40 MG: 40 TABLET, DELAYED RELEASE ORAL at 07:51

## 2024-02-28 RX ADMIN — Medication 37.5 MG: at 20:20

## 2024-02-28 RX ADMIN — FUROSEMIDE 40 MG: 40 TABLET ORAL at 07:51

## 2024-02-28 ASSESSMENT — ACTIVITIES OF DAILY LIVING (ADL)
ADLS_ACUITY_SCORE: 26
ADLS_ACUITY_SCORE: 25
ADLS_ACUITY_SCORE: 25
ADLS_ACUITY_SCORE: 26
ADLS_ACUITY_SCORE: 25
ADLS_ACUITY_SCORE: 26
ADLS_ACUITY_SCORE: 26
ADLS_ACUITY_SCORE: 25
ADLS_ACUITY_SCORE: 26
ADLS_ACUITY_SCORE: 25
ADLS_ACUITY_SCORE: 25
ADLS_ACUITY_SCORE: 26
ADLS_ACUITY_SCORE: 25
ADLS_ACUITY_SCORE: 25

## 2024-02-28 NOTE — PLAN OF CARE
Goal Outcome Evaluation:      Plan of Care Reviewed With: patient    Overall Patient Progress: no change    Outcome Evaluation: Continent of B/B, regular diet thin liquids, takes medications whole with water. Stand by assist with transfers, denies pain this shift. Dentures off and in recipient w/ cleaning tab at bathroom sink. Slept well overnight.       Spoke with patient, she will come to office for blood work (orders in place). Will schedule an appointment for a wellness visit.

## 2024-02-28 NOTE — PLAN OF CARE
Goal Outcome Evaluation:      Plan of Care Reviewed With: patient    Overall Patient Progress: improvingOverall Patient Progress: improving    Pt is alert and oriented  x 4 and able to make his needs known, continent of both bowel and bladder, regular diet thin liquids, takes medications whole with water, ate 100% of his dinner, stand by assist with transfers, vital signs stable, denies pain nor discomfort when asked. Will continue plan of care.

## 2024-02-28 NOTE — PLAN OF CARE
Goal Outcome Evaluation:      Plan of Care Reviewed With: patient    Overall Patient Progress: improvingOverall Patient Progress: improving    Outcome Evaluation: Pt alert and oriented x4,   VSS. Pt didn't have oxygen at night. O2 sats 87% on RA at 0745, O2 sats 95% on O2 1L/NC. Pt didn't use oxygen during therapy. O2 sats >96% on RA at 1140. IS encouraged.   Sternal incision CDI, NILS.   Continent of bowel and bladder, LBM 2/26  SBA in roon and SBA with 4WW, ambulating in hallway during therapy.  Continue with POC.

## 2024-02-28 NOTE — PLAN OF CARE
Discharge Planner Post-Acute Rehab PT:      Discharge Plan: home w/ spouse, adult children     Precautions: falls, sternal     Current Status:  Bed Mobility: min A with bed flat and rail used  Transfer: supervision  Gait: no device in room, CGA; 4ww hallway weaning O2, [employer]/ O2 tank mgmt assist  Stairs: 4x6 steps in stairwell with seated rest between each bout; L rail, CGA,step-to pattern  Balance: stands w/o UE support     Outcome Measures:   Gait speed 2/23:        -w/o device: 0.3 m/s       -w/ 4ww: 0.43 m/s  6 Minute Walk Test on Feb 24: 107 m  HILL on Feb 25 44/56     Assessment: Focus overall on building overall activity tolerance with strength and balance exercises. Pt tolerating well on RA throughout session, SpO2 >92% throughout, remains overall limited by fatigue.      Other Barriers to Discharge (DME, Family Training, etc):   Stairs to access bathroom/shower

## 2024-02-28 NOTE — PLAN OF CARE
Discharge Planner Post-Acute Rehab OT:     Discharge Plan: home with assist for heavy IADLs, OP CR    Precautions: fall, sternal, O2 for activity    Current Status:  ADLs:  Mobility: SBA without AD in room, SBA with 4WW for hallway  Grooming: SBA  Dressing: UB: IND pull over shirt LB: Min A Pants=reassess Footwear: Mod I slipper socks with sock aide, IND skecher slip on shoes, requires assist for compression stockings   Bathing: Min A seated on bench with hand held shower head  Toileting: SBA with grab bar  IADLs: completes cooking, grocery shopping, driving at baseline, anticipate will need increased assist with heavy IADLs at discharge  Vision/Cognition: glasses + double vision at baseline, ACE III score 86/100--mild memory impairments    Assessment: focus to progress ADLs, reviewed sternal precautions with dressing, pt able to doff/don pull over polo shirt IND. Donned slip on skechers fshoes from chair without issue. Trialed donning socks with chair assist pt unable to on RLE. Was able to don with wide sock and and wider sock this tx. Reviewed benefit of continuing to use chair stretch to increase ROM for LB dsg.     Other Barriers to Discharge (DME, Family Training, etc):   Family training completed 2/27.  Has shower chair. Daughter provided with handout on reacher, sock aid, long handled sponge and reports will obtain.

## 2024-02-29 ENCOUNTER — APPOINTMENT (OUTPATIENT)
Dept: PHYSICAL THERAPY | Facility: CLINIC | Age: 79
DRG: 949 | End: 2024-02-29
Attending: PHYSICAL MEDICINE & REHABILITATION
Payer: MEDICARE

## 2024-02-29 ENCOUNTER — APPOINTMENT (OUTPATIENT)
Dept: OCCUPATIONAL THERAPY | Facility: CLINIC | Age: 79
DRG: 949 | End: 2024-02-29
Attending: PHYSICAL MEDICINE & REHABILITATION
Payer: MEDICARE

## 2024-02-29 PROCEDURE — 97530 THERAPEUTIC ACTIVITIES: CPT | Mod: GP

## 2024-02-29 PROCEDURE — 128N000003 HC R&B REHAB

## 2024-02-29 PROCEDURE — 97535 SELF CARE MNGMENT TRAINING: CPT | Mod: GO

## 2024-02-29 PROCEDURE — 99232 SBSQ HOSP IP/OBS MODERATE 35: CPT | Mod: GC | Performed by: PHYSICAL MEDICINE & REHABILITATION

## 2024-02-29 PROCEDURE — 999N000150 HC STATISTIC PT MED CONFERENCE < 30 MIN

## 2024-02-29 PROCEDURE — 250N000013 HC RX MED GY IP 250 OP 250 PS 637: Performed by: PHYSICIAN ASSISTANT

## 2024-02-29 PROCEDURE — 97112 NEUROMUSCULAR REEDUCATION: CPT | Mod: GP

## 2024-02-29 PROCEDURE — 999N000125 HC STATISTIC PATIENT MED CONFERENCE < 30 MIN

## 2024-02-29 PROCEDURE — 250N000013 HC RX MED GY IP 250 OP 250 PS 637: Performed by: PHYSICAL MEDICINE & REHABILITATION

## 2024-02-29 PROCEDURE — 97110 THERAPEUTIC EXERCISES: CPT | Mod: GP

## 2024-02-29 RX ADMIN — AMIODARONE HYDROCHLORIDE 200 MG: 200 TABLET ORAL at 08:14

## 2024-02-29 RX ADMIN — FUROSEMIDE 40 MG: 40 TABLET ORAL at 08:14

## 2024-02-29 RX ADMIN — BISACODYL 10 MG: 10 SUPPOSITORY RECTAL at 20:01

## 2024-02-29 RX ADMIN — SENNOSIDES AND DOCUSATE SODIUM 1 TABLET: 8.6; 5 TABLET ORAL at 08:14

## 2024-02-29 RX ADMIN — ROSUVASTATIN CALCIUM 40 MG: 20 TABLET, FILM COATED ORAL at 21:17

## 2024-02-29 RX ADMIN — POLYETHYLENE GLYCOL 3350 17 G: 17 POWDER, FOR SOLUTION ORAL at 14:48

## 2024-02-29 RX ADMIN — Medication 37.5 MG: at 08:14

## 2024-02-29 RX ADMIN — Medication 1 TABLET: at 08:14

## 2024-02-29 RX ADMIN — LIDOCAINE 1 PATCH: 4 PATCH TOPICAL at 08:15

## 2024-02-29 RX ADMIN — APIXABAN 5 MG: 5 TABLET, FILM COATED ORAL at 20:01

## 2024-02-29 RX ADMIN — Medication 37.5 MG: at 21:17

## 2024-02-29 RX ADMIN — NYSTATIN: 100000 CREAM TOPICAL at 20:01

## 2024-02-29 RX ADMIN — PANTOPRAZOLE SODIUM 40 MG: 40 TABLET, DELAYED RELEASE ORAL at 06:17

## 2024-02-29 RX ADMIN — APIXABAN 5 MG: 5 TABLET, FILM COATED ORAL at 08:14

## 2024-02-29 RX ADMIN — ASPIRIN 81 MG: 81 TABLET, COATED ORAL at 08:14

## 2024-02-29 RX ADMIN — SENNOSIDES AND DOCUSATE SODIUM 1 TABLET: 8.6; 5 TABLET ORAL at 21:17

## 2024-02-29 ASSESSMENT — ACTIVITIES OF DAILY LIVING (ADL)
ADLS_ACUITY_SCORE: 25
ADLS_ACUITY_SCORE: 24
ADLS_ACUITY_SCORE: 25
ADLS_ACUITY_SCORE: 26
ADLS_ACUITY_SCORE: 25
ADLS_ACUITY_SCORE: 25
ADLS_ACUITY_SCORE: 24
ADLS_ACUITY_SCORE: 25
ADLS_ACUITY_SCORE: 24
ADLS_ACUITY_SCORE: 25
ADLS_ACUITY_SCORE: 25
ADLS_ACUITY_SCORE: 24
ADLS_ACUITY_SCORE: 24
ADLS_ACUITY_SCORE: 26
ADLS_ACUITY_SCORE: 24
ADLS_ACUITY_SCORE: 25

## 2024-02-29 NOTE — PLAN OF CARE
Patient has been assessed for Home Oxygen needs. Oxygen readings:    *Pulse oximetry (SpO2) = 96% on room air at rest while awake.      *SpO2 = 96% on room air during activity/with exercise.

## 2024-02-29 NOTE — PLAN OF CARE
"Acute Rehab Care Conference/Team Rounds    Type: Team Rounds    Present:  Dr. De Horta PM&R, Resident MD Estrada Butterfield, Dr. Shahana Cueto Neuropsychologist, Nelly Bennett PT, Mary Andrade OT, Shakila George LICSW, Brooklynn Avitia RD, Bindu Telles RN, and Nick \"Hai\" Zen Patient.     Discharge Barriers/Treatment/Education    Rehab Diagnosis: debility post CABG    Active Medical Co-morbidities/Prognosis:     Patient Active Problem List   Diagnosis Code     Coronary artery disease due to lipid rich plaque I25.10, I25.83     Primary hypertension I10     Pure hypercholesterolemia E78.00     Rheumatic aortic stenosis I06.0     Obesity E66.9     Benign paroxysmal positional vertigo due to bilateral vestibular disorder H81.13     Hyperkalemia E87.5     Angina pectoris (H24) I20.9     CARRILLO (dyspnea on exertion) R06.09     Status post coronary angiogram Z98.890     Other ill-defined heart diseases I51.89     S/P CABG (coronary artery bypass graft) Z95.1       Safety:Pt is alert and oriented. Use call light appropriately. SBA1 w/o device for ambulation. On oxygen at 1L/nc. Pt request to have it on overnight. Head of bed elevated and encourage to do deep breathing.    Pain:Denies pain.    Medications, Skin, Tubes/Lines: Can take medications whole. Sternal incision and chest tubes scab NILS. RLE graft  w/ primapore.BLE edema, wears TG during daytime.    Swallowing/Nutrition:  Orders Placed This Encounter      Combination Diet Regular Diet      Bowel/Bladder: Cont of B/Bm. LBM 2/26/24.    Psychosocial: , lives in a home with spouse, adult dtr, and adult son. Wife has dementia. Dtr works from home most days and son has had strokes but indep with his own cares. Pt denied mental health, substance use, or financial concerns. Good support. Indep at baseline. Hard of hearing. Retired.     ADLs/IADLs:ADLs:    Mobility: mod Iwithout AD in room, SBA with 4WW for hallway    Grooming: SBA    Dressing: UB: " IND pull over shirt LB: Min A Pants=reassess Footwear: Mod I slipper socks with sock aide, IND skecher slip on shoes, requires assist for compression stockings     Bathing: Min A seated on bench with hand held shower head    Toileting: SBA with grab bar  IADLs: completes cooking, grocery shopping, driving at baseline, anticipate will need increased assist with heavy IADLs at discharge  Vision/Cognition: glasses + double vision at baseline, ACE III score 86/100--mild memory impairments    Mobility: Pt demonstrating progress in functional mobility, nearing discharge. Pt currently is demonstrating bed mobility Sonido, transfers SBA (with vs w/o AD), and amb with SBA w/ 4WW. Per pt HILL score, near falls risk cutoff score, thus pt continues to utilize 4WW for longer distance amb, but amb in-room w/o AD. Pt demonstrates stairs navigation CGA with L rail. Pt family has participated in family training, recommendation for HH PT with transition to OP PT.     Cognition/Language:    Community Re-Entry: Recommendation for HH therapy follow up prior to community re-entry.    Transportation: Car transfer with assist. Anticipate family to provide transportation. Community mobility w/ 4WW and assist.    Decision maker: self    Plan of Care and goals reviewed and updated.    Discharge Plan/Recommendations    Fall Precautions: continue    Patient/Family input to goals: yes     Estimated length of stay: 14 days     Overall plan for the patient: reach a level of mod I       Utilization Review and Continued Stay Justification    Medical Necessity Criteria:    For any criteria that is not met, please document reason and plan for discharge, transfer, or modification of plan of care to address.    Requires intensive rehabilitation program to treat functional deficits?: Yes    Requires 3x per week or greater involvement of rehabilitation physician to oversee rehabilitation program?: Yes    Requires rehabilitation nursing interventions?:  Yes    Patient is making functional progress?: Yes    There is a potential for additional functional progress? Yes    Patient is participating in therapy 3 hours per day a minimum of 5 days per week or 15 hours per week in 7 day period?:Yes    Has discharge needs that require coordinated discharge planning approach?:Yes      Barriers/Concerns related to meeting medical necessity criteria:  none    Team Plan to Address Concern:  As needed      Final Physician Sign off    Statement of Approval:  De Horta, DO      Patient Goals    Social Work Goals: Confirm discharge recommendations with therapy, coordinate safe discharge plan and remain available to support and assist as needed.    OT Predicted Duration/Target Date for Goal Attainment: 03/04/24  Therapy Frequency (OT): 6 times/week  OT: Hygiene/Grooming: independent, modified independent  OT: Upper Body Dressing: Independent, Supervision/stand-by assist  OT: Lower Body Dressing: Independent, Modified independent  OT: Upper Body Bathing: Independent, Modified independent  OT: Lower Body Bathing: Independent, Modified independent     OT: Transfer: Independent, Modified independent (walk in shower transfer with appropriate DME)  OT: Toilet Transfer/Toileting: Independent, Modified independent  OT: Meal Preparation: Independent, Modified independent, with simple meal preparation, ambulatory level, within precautions  OT: Home Management: Independent, Modified independent, with light demand household tasks, ambulatory level, within precautions  OT: Cognitive: Patient/caregiver will verbalize understanding of cognitive assessment results/recommendations as needed for safe discharge planning       PT Predicted Duration/Target Date for Goal Attainment: 03/04/24  PT Frequency: 6x/week  PT: Bed Mobility: Independent  PT: Transfers: Supervision/stand-by assist, Goal Met  PT: Gait: Supervision/stand-by assist, Goal Met  PT: Stairs: Goal Met (CGA)  PT: Perform aerobic  activity with stable cardiovascular response: treadmill  PT: Goal 1: SBA car transfer goal met  PT: Goal 2: Goal discontinued                              Patient/Family Goal: Medication Management: Patient will have knowledge on medication management and pass MAP if needed before discharge from ARU        Goal: Skin Integrity: Patient will reposition independently in bed/chair to prevent pressure injury during his stay in ARU         Goal: Safety Management: Patient wiill use call light appropriately and wait for assistance until made BHARATH/ Independent or discharges from ARU

## 2024-02-29 NOTE — PLAN OF CARE
"Goal Outcome Evaluation: Progressing  Patient has had eliquis education previously prior to coming to [facility]. A copy of \"Guide to the Newer Oral Anticoagulants\" given to patient to review and show to family.  Tomorrow patient's daughter plans to come for family training from 1:15-2:15.  She would like to be shown wound care at that time by nursing.  Sleep study ordered for tonight to see if patient qualifies for home O2 at night.  Now modified independent in the room and to the bathroom.   Sternal incision healing well.    Edema to bilateral lower extremities. Tubi  applied this am.  LBM 2/26/24. Passing flatus, bowel sounds positive.  Denies feeling uncomfortable. Declined miralax this morning however took it this afternoon.                        "

## 2024-02-29 NOTE — PLAN OF CARE
Discharge Planner Post-Acute Rehab OT:     Discharge Plan: home with assist for heavy IADLs, OP CR    Precautions: fall, sternal, O2 for activity    Current Status:  ADLs:  Mobility: mod I without AD in room, SBA with 4WW for hallway  Grooming:mod I  Dressing: UB: IND pull over shirt LB: Min A Pants=reassess Footwear: Mod I slipper socks with sock aide, IND skecher slip on shoes, requires assist for compression stockings   Bathing: Min A seated on bench with hand held shower head.walk in shower transfer sba side stepping over 4 inch lip  Toileting: mod I handicap height toilet with grab bar  IADLs: completes cooking, grocery shopping, driving at baseline, anticipate will need increased assist with heavy IADLs at discharge  Vision/Cognition: glasses + double vision at baseline, ACE III score 86/100--mild memory impairments    Assessment: pt independent in room without device toilet comfort height at home will be functional for pt. Sba/mod I advanced low family room style chair transfers, pt increase use of plb and energy conservation with ot tx    Other Barriers to Discharge (DME, Family Training, etc):   Family training completed 2/27.  Has shower chair. Daughter provided with handout on reacher, sock aid, long handled sponge and reports will obtain.

## 2024-02-29 NOTE — PROGRESS NOTES
"  [facility]   Acute Rehabilitation Unit  Daily progress note    INTERVAL HISTORY  Nursing notes reviewed, no acute events overnight. The patient requires 1 LPM of O2 at night. Had PRN miralax given 2/28.     Per david review LBM is 2/26.    Today the patient is doing well, he called his daughter Carlotta while the medical team, SW, and therapies provided an update. All questions were answered. The patient agrees that he has been requiring oxygen at night and was informed that we will be looking at his oxygen levels. The patient was also concerned about his edema, therapies explained about his tubi  and slip on skechers.    Otherwise the patient denies headaches, dizziness, lightheadedness, denies nausea or vomiting, shortness of breath, chest pain, abdominal pain or pain anywhere else at this time. Patient also denies new bowel or bladder issues. There were no further concerns at this time.        Functional  Please refer to today's team rounds note for current functional status.    ROS: 10 point ROS neg other than the symptoms noted above in the HPI.          MEDICATIONS  Scheduled meds   amiodarone  200 mg Oral Daily    apixaban ANTICOAGULANT  5 mg Oral BID    aspirin  81 mg Oral Daily    furosemide  40 mg Oral BID    lidocaine  1 patch Transdermal Q24H    metoprolol tartrate  37.5 mg Oral BID    multivitamin w/minerals  1 tablet Oral Daily    nystatin   Topical BID    pantoprazole  40 mg Oral QAM AC    rosuvastatin  40 mg Oral At Bedtime    senna-docusate  1 tablet Oral BID       PRN meds:  acetaminophen, bisacodyl, guaiFENesin, ipratropium - albuterol 0.5 mg/2.5 mg/3 mL, magnesium hydroxide, nitroGLYcerin, - MEDICATION INSTRUCTIONS -, polyethylene glycol      PHYSICAL EXAM  BP (!) 140/69 (BP Location: Left arm)   Pulse 77   Temp 98.1  F (36.7  C) (Oral)   Resp 18   Ht 1.778 m (5' 10\")   Wt 104.2 kg (229 lb 11.2 oz)   SpO2 94%   BMI 32.96 kg/m    Gen: NAD, resting " in bed   HEENT: NCAT, EOMI, wearing NC O2  Cardio: 2+ radial pulse, well perfused  Pulm: nonlabored, symmetrical chest rise  Abd: protuberant, nontender, + BS  Ext: there is edema in B/L LE, non tender, Bandage on RLE medail distal leg CDI.  Diminished sensation in RLE.    Neuro/MSK: AAOx3, full AROM thoughout     LABS    Lab Results   Component Value Date    WBC 10.5 02/20/2024     Lab Results   Component Value Date    RBC 3.02 02/20/2024     Lab Results   Component Value Date    HGB 9.0 02/20/2024     Lab Results   Component Value Date    HCT 28.2 02/20/2024     Lab Results   Component Value Date    MCV 93 02/20/2024     Lab Results   Component Value Date    MCH 29.8 02/20/2024     Lab Results   Component Value Date    MCHC 31.9 02/20/2024     Lab Results   Component Value Date    RDW 14.0 02/20/2024     Lab Results   Component Value Date     02/21/2024     Last Comprehensive Metabolic Panel:  Lab Results   Component Value Date     02/26/2024    POTASSIUM 4.3 02/26/2024    CHLORIDE 100 02/26/2024    CO2 31 (H) 02/26/2024    ANIONGAP 5 (L) 02/26/2024    GLC 92 02/26/2024    BUN 20.9 02/26/2024    CR 1.08 02/26/2024    GFRESTIMATED 70 02/26/2024    BUBBA 8.5 (L) 02/26/2024           ASSESSMENT AND PLAN  Nick Zaldivar is a 78 year old man with past medical history of HTN, HLD, aortic stenosis, obesity, heart failure with preserved EF and CAD who was admitted for planned CABG & AVR, underwent 3 vessel coronary artery bypass 2/14/24 valve replacement was unable to be performed. Hospital course complicated by hypervolemia, atelectasis, acute blood loss anemia, and A-fib.  Functionally noted to have impaired strength, impaired balance, and impaired activity tolerance, he was admitted to rehab 2/22/24.         Admission to acute inpatient rehab s/p CABG x 3.    Impairment group code: 9        PT, OT 90 minutes of each on a daily basis up to 6 days per week, in addition to rehab nursing and close management of  physiatrist.       Impairment of ADL's: Noted to have impaired strength, impaired activity tolerance, and impaired balance leading to decreased ability to independently complete ADL's.  Will benefit from ongoing OT with goal for MOD I with basic ADLs.      Impairment of mobility:  Noted to have impaired strength, impaired activity tolerance, and impaired balance leading to decreased mobility.  Will benefit from ongoing PT with goal for BHARATH with basic mobility.      Medical Conditions  CAD s/p CABG x 3    Completed 2/14/24 per Dr. Flanagan.   -sternal precautions x 8 weeks  -cleanse incision daily  -had 2 staples left on the right distal leg area - asked the nursing team to remove, continue monitor site  -asa 81  -continue crestor  -follow up cv surgery     Post operative A-fib  -started on amiodarone tapered down and admitted to aru on 200 mg daily x 21 days  -continue apixaban  -continue metoprolol  -follow up Dr. Michael as scheduled.      HF preserved EF  -continue metoprolol 37.5 mg bid  -continue lasix 40 mg bid (home dose)  -monitor weight and volume status daily      Hypoxic respiratory failure  Atelectasis  -mucinex bid prn  -duo nebs bid prn   -encourage IS  -monitor oxygen level- oxygen prn to keep sats >90%  -Patient has required 1 LPM of O2 via Nasal canula at night. On 2/29 he had an O2 walk test with therapies and a test for overnight oxygen saturation was ordered.     Acute blood loss anemia  Hgb 13.9 pre-op down to 8.9 2/16/24 stable 9.0 2/20.  -2/26: 9.1      Pre Diabetes  Hgb A1C 5.9%  -trend with primary care     Polyneuropathy ?   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 This can be due to DM vs PAD vs idiopathic vs combination. Should follow up with PCP for basic screening blood work (SPEP, protein ASHLIE, B12 with refelct MMA, B6, B1 - A1c and TSH were checked recently) and possible NCS/EMG. Needs education on excellent skin/nail  monitoring and care to avoid future complications.        Adjustment to disability:  Clinical psychology to eval and treat as indicated  FEN: reg   Bowel: monitor; he is constipated so consider increasing senokot dose and scheduling miralax pending his course   Bladder: monitor  DVT Prophylaxis: apixaban  GI Prophylaxis: PPI  Code: full - confirmed upon admission   Disposition: goal for home  ELOS:  one week to 10 days  - impaired endurance and stairs at home are main barriers. Might need regular rehab and then cardiac rehab due to poor endurance.    Tentative date for discharge is 3/2  Rehab prognosis:  good  Follow up Appointments on Discharge: pcp, cardiology, cv surgery          Patient seen and discussed with my attending Dr Mychal Butterfield MD   Resident Physician PGY-2  Physical Medicine and Rehabilitation  02/29/2024 11:37 AM

## 2024-02-29 NOTE — PROGRESS NOTES
"  [facility]   Acute Rehabilitation Unit  Daily progress note    INTERVAL HISTORY  Nick Zaldivar was seen and examined at bedside. No acute events overnight.  Denies chest pain, shortness of breath, no fever or chills.  Making great progress thus far with therapy.  [facility]  Will need overnight Pulse Ox for home O2 if needed.       Functional  PT:  Bed Mobility: min A with bed flat and rail used  Transfer: supervision  Gait: no device in room, CGA; 4ww hallway weaning O2, Suopervision/ O2 tank mgmt assist  Stairs: 4x6 steps in stairwell with seated rest between each bout; L rail, CGA,step-to pattern  Balance: stands w/o UE support     Outcome Measures:   Gait speed 2/23:        -w/o device: 0.3 m/s       -w/ 4ww: 0.43 m/s  6 Minute Walk Test on Feb 24: 107 m  HILL on Feb 25 44/56    ROS: 10 point ROS neg other than the symptoms noted above in the HPI.          MEDICATIONS  Scheduled meds    amiodarone  200 mg Oral Daily     apixaban ANTICOAGULANT  5 mg Oral BID     aspirin  81 mg Oral Daily     furosemide  40 mg Oral BID     lidocaine  1 patch Transdermal Q24H     metoprolol tartrate  37.5 mg Oral BID     multivitamin w/minerals  1 tablet Oral Daily     nystatin   Topical BID     pantoprazole  40 mg Oral QAM AC     rosuvastatin  40 mg Oral At Bedtime     senna-docusate  1 tablet Oral BID       PRN meds:  acetaminophen, bisacodyl, guaiFENesin, ipratropium - albuterol 0.5 mg/2.5 mg/3 mL, magnesium hydroxide, nitroGLYcerin, - MEDICATION INSTRUCTIONS -, polyethylene glycol      PHYSICAL EXAM  BP (!) 140/69 (BP Location: Left arm)   Pulse 77   Temp 98.1  F (36.7  C) (Oral)   Resp 18   Ht 1.778 m (5' 10\")   Wt 104.2 kg (229 lb 11.2 oz)   SpO2 94%   BMI 32.96 kg/m    Gen: NAD, resting in chair   HEENT: NCAT, EOMI, on RA   Cardio: 2+ radial pulse, well perfused  Pulm: nonlabored, symmetrical chest rise  Abd: protuberant, nontender, + BS  Ext: there is edema " in B/L LE, non tender, Bandage on RLE medail distal leg CDI.  Diminished sensation in RLE.    Neuro/MSK: AAOx3, full AROM thoughout     LABS    Lab Results   Component Value Date    WBC 10.5 02/20/2024     Lab Results   Component Value Date    RBC 3.02 02/20/2024     Lab Results   Component Value Date    HGB 9.0 02/20/2024     Lab Results   Component Value Date    HCT 28.2 02/20/2024     Lab Results   Component Value Date    MCV 93 02/20/2024     Lab Results   Component Value Date    MCH 29.8 02/20/2024     Lab Results   Component Value Date    MCHC 31.9 02/20/2024     Lab Results   Component Value Date    RDW 14.0 02/20/2024     Lab Results   Component Value Date     02/21/2024     Last Comprehensive Metabolic Panel:  Lab Results   Component Value Date     02/26/2024    POTASSIUM 4.3 02/26/2024    CHLORIDE 100 02/26/2024    CO2 31 (H) 02/26/2024    ANIONGAP 5 (L) 02/26/2024    GLC 92 02/26/2024    BUN 20.9 02/26/2024    CR 1.08 02/26/2024    GFRESTIMATED 70 02/26/2024    BUBBA 8.5 (L) 02/26/2024           ASSESSMENT AND PLAN  Nick Zaldivar is a 78 year old man with past medical history of HTN, HLD, aortic stenosis, obesity, heart failure with preserved EF and CAD who was admitted for planned CABG & AVR, underwent 3 vessel coronary artery bypass 2/14/24 valve replacement was unable to be performed. Hospital course complicated by hypervolemia, atelectasis, acute blood loss anemia, and A-fib.  Functionally noted to have impaired strength, impaired balance, and impaired activity tolerance, he was admitted to rehab 2/22/24.         Admission to acute inpatient rehab s/p CABG x 3.    Impairment group code: 9        1. PT, OT 90 minutes of each on a daily basis up to 6 days per week, in addition to rehab nursing and close management of physiatrist.       2. Impairment of ADL's: Noted to have impaired strength, impaired activity tolerance, and impaired balance leading to decreased ability to independently  complete ADL's.  Will benefit from ongoing OT with goal for MOD I with basic ADLs.      3. Impairment of mobility:  Noted to have impaired strength, impaired activity tolerance, and impaired balance leading to decreased mobility.  Will benefit from ongoing PT with goal for BHARATH with basic mobility.      Medical Conditions  CAD s/p CABG x 3    Completed 2/14/24 per Dr. Flanagan.   -sternal precautions x 8 weeks  -cleanse incision daily  -had 2 staples left on the right distal leg area - asked the nursing team to remove, continue monitor site  -asa 81  -continue crestor  -follow up cv surgery     Post operative A-fib  -started on amiodarone tapered down and admitted to aru on 200 mg daily x 21 days  -continue apixaban  -continue metoprolol  -follow up Dr. Michael as scheduled.      HF preserved EF  -continue metoprolol 37.5 mg bid  -continue lasix 40 mg bid (home dose)  -monitor weight and volume status daily      Hypoxic respiratory failure  Atelectasis  -mucinex bid prn  -duo nebs bid prn   -encourage IS  -monitor oxygen level- oxygen prn to keep sats >90%     Acute blood loss anemia  Hgb 13.9 pre-op down to 8.9 2/16/24 stable 9.0 2/20.  -2/26: 9.1      Pre Diabetes  Hgb A1C 5.9%  -trend with primary care     Polyneuropathy ?   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 This can be due to DM vs PAD vs idiopathic vs combination. Should follow up with PCP for basic screening blood work (SPEP, protein ASHLIE, B12 with refelct MMA, B6, B1 - A1c and TSH were checked recently) and possible NCS/EMG. Needs education on excellent skin/nail monitoring and care to avoid future complications.        4. Adjustment to disability:  Clinical psychology to eval and treat as indicated  5. FEN: reg   6. Bowel: monitor; he is constipated so consider increasing senokot dose and scheduling miralax pending his course   7. Bladder: monitor  8. DVT Prophylaxis: apixaban  9. GI  Prophylaxis: PPI  10. Code: full - confirmed upon admission   11. Disposition: goal for home  12. ELOS:  one week to 10 days  - impaired endurance and stairs at home are main barriers. Might need regular rehab and then cardiac rehab due to poor endurance.    13. Rehab prognosis:  good  14. Follow up Appointments on Discharge: pcp, cardiology, cv surgery          De Horta, DO  Physical Medicine & Rehabilitation    I spent a total of  25 minutes face to face and coordinating care of Nick Zaldivar.  Over 50% of my time on the unit was spent counseling the patient and /or coordinating care regarding post cardiac surgery.

## 2024-02-29 NOTE — PLAN OF CARE
Goal Outcome Evaluation:      Plan of Care Reviewed With: patient    Overall Patient Progress: improvingOverall Patient Progress: improving    Outcome Evaluation: Pt is alert and oriented x 4. Vitals stable. Denied pain. Sternal incision and right and left medial knee incisions, as well as CT sites, NILS and healing well. Right lower leg incision dressing changed. BLE tubigrips. Warm application to right inner thigh comforting per pt. SBA with transfers.

## 2024-02-29 NOTE — PLAN OF CARE
Discharge Planner Post-Acute Rehab PT:      Discharge Plan: home w/ spouse, adult children     Precautions: falls, sternal     Current Status:  Bed Mobility: min A with bed flat and rail used  Transfer: supervision  Gait: no device in room, CGA; 4ww hallway weaning O2, Suopervision/ O2 tank mgmt assist  Stairs: 4x6 steps in stairwell with seated rest between each bout; L rail, CGA,step-to pattern  Balance: stands w/o UE support     Outcome Measures:   Gait speed 2/23:        -w/o device: 0.3 m/s       -w/ 4ww: 0.43 m/s  6 Minute Walk Test on Feb 24: 107 m  HILL on Feb 25 44/56     Assessment: Continued emphasis on building overall activity tolerance and endurance today. Home O2 test performed, patient remained >96% on RA before and following gait and stairs. Continues to be limited by fatigue and decreased endurance.      Other Barriers to Discharge (DME, Family Training, etc):   Stairs to access bathroom/shower

## 2024-02-29 NOTE — PLAN OF CARE
Goal Outcome Evaluation:      Plan of Care Reviewed With: patient    Overall Patient Progress: improvingOverall Patient Progress: improving    Outcome Evaluation: Alert and oriented x4; [place] with pocket talker but didn't used it. Patient denies of pain/SOB at rest/ nause and vomiting. O2 sat stable on high 90's. Observed to have SOB on exertion with relief at rest. Patient reported that yesterday he was not using his oxygen at night; woke up short of breath. Patient needs encouragement to use IS. IS at 500-600cc level. O2 placed per nc at 1LPM.Left a sticky note for MD.  Patient with scabbed incision on abdomen; sternal incision healed. Edematous lower legs. Tubi  off; removed at night. Continent of bladder. No BM since 2/26/24 prn miralax given. Patient is up with SBA with no assistive device. Patient on sternal and fall precaution.

## 2024-02-29 NOTE — PROGRESS NOTES
Team rounds this morning. Will plan for pt to discharge home Sat 03/02/24. Plan for HC now. Referral for HC RN, PT and OT sent to [facility] and pending. Pt is agreeable to the plan. PT doing walk test this afternoon. JOHANN this evening. Order placed by MD and Charge RN notified. SW will follow-up tomorrow on O2 needs and coordinate as needed. SW will plan to meet with pt and complete IRF and IMM before discharge home. No other immediate needs identified at this time. SW will continue to follow.     ADDENDUM: [facility] accepted for RN, PT, and OT.  updated HC liaison that pt is [place] and may be best to meet with pt prior to discharge to confirm and schedule SOC. Information added to AVS. SW will meet with pt tomorrow to update, follow-up on O2 needs, and complete IRF and IMM ahead of discharge.     Home Health Care:   [facility] PH: [phone]   Nurse, physical therapy, occupational therapy    [facility] Acute Inpatient Rehab    PH: [phone] & Fax: [phone]  Email: [email or web address]

## 2024-03-01 ENCOUNTER — APPOINTMENT (OUTPATIENT)
Dept: OCCUPATIONAL THERAPY | Facility: CLINIC | Age: 79
DRG: 949 | End: 2024-03-01
Attending: PHYSICAL MEDICINE & REHABILITATION
Payer: MEDICARE

## 2024-03-01 ENCOUNTER — APPOINTMENT (OUTPATIENT)
Dept: PHYSICAL THERAPY | Facility: CLINIC | Age: 79
DRG: 949 | End: 2024-03-01
Attending: PHYSICAL MEDICINE & REHABILITATION
Payer: MEDICARE

## 2024-03-01 PROCEDURE — 97110 THERAPEUTIC EXERCISES: CPT | Mod: GP | Performed by: PHYSICAL THERAPIST

## 2024-03-01 PROCEDURE — 97535 SELF CARE MNGMENT TRAINING: CPT | Mod: GO

## 2024-03-01 PROCEDURE — 128N000003 HC R&B REHAB

## 2024-03-01 PROCEDURE — 97530 THERAPEUTIC ACTIVITIES: CPT | Mod: GP

## 2024-03-01 PROCEDURE — 99232 SBSQ HOSP IP/OBS MODERATE 35: CPT | Mod: GC | Performed by: PHYSICAL MEDICINE & REHABILITATION

## 2024-03-01 PROCEDURE — 97530 THERAPEUTIC ACTIVITIES: CPT | Mod: GP | Performed by: PHYSICAL THERAPIST

## 2024-03-01 PROCEDURE — 250N000013 HC RX MED GY IP 250 OP 250 PS 637: Performed by: PHYSICAL MEDICINE & REHABILITATION

## 2024-03-01 PROCEDURE — 250N000013 HC RX MED GY IP 250 OP 250 PS 637: Performed by: PHYSICIAN ASSISTANT

## 2024-03-01 RX ORDER — PANTOPRAZOLE SODIUM 40 MG/1
40 TABLET, DELAYED RELEASE ORAL
Qty: 30 TABLET | Refills: 0 | Status: SHIPPED | OUTPATIENT
Start: 2024-03-02 | End: 2024-03-25

## 2024-03-01 RX ORDER — ASPIRIN 81 MG/1
81 TABLET ORAL DAILY
Qty: 30 TABLET | Refills: 0 | Status: SHIPPED | OUTPATIENT
Start: 2024-03-01 | End: 2024-03-31

## 2024-03-01 RX ORDER — AMIODARONE HYDROCHLORIDE 200 MG/1
200 TABLET ORAL DAILY
Qty: 30 TABLET | Refills: 0 | Status: SHIPPED | OUTPATIENT
Start: 2024-03-01 | End: 2024-03-21

## 2024-03-01 RX ORDER — ROSUVASTATIN CALCIUM 40 MG/1
40 TABLET, COATED ORAL AT BEDTIME
Qty: 30 TABLET | Refills: 0 | Status: SHIPPED | OUTPATIENT
Start: 2024-03-01 | End: 2024-08-07

## 2024-03-01 RX ORDER — FUROSEMIDE 20 MG
40 TABLET ORAL 2 TIMES DAILY
Qty: 120 TABLET | Refills: 0 | Status: SHIPPED | OUTPATIENT
Start: 2024-03-01 | End: 2024-03-25

## 2024-03-01 RX ORDER — METOPROLOL TARTRATE 37.5 MG/1
37.5 TABLET, FILM COATED ORAL 2 TIMES DAILY
Qty: 60 TABLET | Refills: 0 | Status: SHIPPED | OUTPATIENT
Start: 2024-03-01 | End: 2024-08-07

## 2024-03-01 RX ORDER — AMOXICILLIN 250 MG
1 CAPSULE ORAL 2 TIMES DAILY PRN
Qty: 60 TABLET | Refills: 0 | Status: SHIPPED | OUTPATIENT
Start: 2024-03-01 | End: 2024-03-21 | Stop reason: ALTCHOICE

## 2024-03-01 RX ORDER — NYSTATIN 100000 U/G
CREAM TOPICAL 2 TIMES DAILY PRN
Qty: 30 G | Refills: 0 | Status: SHIPPED | OUTPATIENT
Start: 2024-03-01 | End: 2024-03-31

## 2024-03-01 RX ADMIN — NYSTATIN: 100000 CREAM TOPICAL at 20:08

## 2024-03-01 RX ADMIN — APIXABAN 5 MG: 5 TABLET, FILM COATED ORAL at 08:41

## 2024-03-01 RX ADMIN — Medication 37.5 MG: at 08:40

## 2024-03-01 RX ADMIN — Medication 1 TABLET: at 08:39

## 2024-03-01 RX ADMIN — SENNOSIDES AND DOCUSATE SODIUM 1 TABLET: 8.6; 5 TABLET ORAL at 20:11

## 2024-03-01 RX ADMIN — SENNOSIDES AND DOCUSATE SODIUM 1 TABLET: 8.6; 5 TABLET ORAL at 08:41

## 2024-03-01 RX ADMIN — FUROSEMIDE 40 MG: 40 TABLET ORAL at 08:39

## 2024-03-01 RX ADMIN — AMIODARONE HYDROCHLORIDE 200 MG: 200 TABLET ORAL at 08:39

## 2024-03-01 RX ADMIN — FUROSEMIDE 40 MG: 40 TABLET ORAL at 15:48

## 2024-03-01 RX ADMIN — LIDOCAINE 1 PATCH: 4 PATCH TOPICAL at 08:38

## 2024-03-01 RX ADMIN — ROSUVASTATIN CALCIUM 40 MG: 20 TABLET, FILM COATED ORAL at 20:10

## 2024-03-01 RX ADMIN — Medication 37.5 MG: at 20:11

## 2024-03-01 RX ADMIN — ASPIRIN 81 MG: 81 TABLET, COATED ORAL at 08:41

## 2024-03-01 RX ADMIN — APIXABAN 5 MG: 5 TABLET, FILM COATED ORAL at 20:11

## 2024-03-01 RX ADMIN — PANTOPRAZOLE SODIUM 40 MG: 40 TABLET, DELAYED RELEASE ORAL at 07:00

## 2024-03-01 ASSESSMENT — ACTIVITIES OF DAILY LIVING (ADL)
ADLS_ACUITY_SCORE: 25
ADLS_ACUITY_SCORE: 24
ADLS_ACUITY_SCORE: 25
ADLS_ACUITY_SCORE: 24
ADLS_ACUITY_SCORE: 25
ADLS_ACUITY_SCORE: 24
ADLS_ACUITY_SCORE: 24
ADLS_ACUITY_SCORE: 25
ADLS_ACUITY_SCORE: 24
ADLS_ACUITY_SCORE: 25
ADLS_ACUITY_SCORE: 25
ADLS_ACUITY_SCORE: 24
ADLS_ACUITY_SCORE: 25
ADLS_ACUITY_SCORE: 24
ADLS_ACUITY_SCORE: 24
ADLS_ACUITY_SCORE: 25

## 2024-03-01 NOTE — PLAN OF CARE
Patient alert and oriented. Slept through this shift. Denied pain or discomfort. MOD I in room. Voiding adequately without concern. On Oximetry study during shift. No SOB or chest pain voiced. Resting in bed with no acute distress noted. Call light within reach. Continue with plan of care.

## 2024-03-01 NOTE — PLAN OF CARE
Goal Outcome Evaluation:      Plan of Care Reviewed With: patient    Overall Patient Progress: improvingOverall Patient Progress: improving    Outcome Evaluation: Pt alert and oriented x4, [place], VSS, O2 sats >94% on RA. IS encouraged.  C/o mild pain to left side of chest, scheduled lidocaine patch applied.   Sternal incision, old chest tube sites CDI, NILS.  Had a shower with OT.   Independent in room for mobility, using 4WW in hallways.  Continent of bowel and bladder, LBM 2/29  Current medication list provided and reviewed each medication with patient, pt verbalized understanding.    On track for discharge home tomorrow.

## 2024-03-01 NOTE — DISCHARGE SUMMARY
[facility]   Acute Rehabilitation Unit  Discharge summary     Date of Admission: 2/22/2024  Date of Discharge: 3/2/2024  Disposition: home  Primary Care Physician: Regulo Briones  Attending physician: De Horta DO         DISCHARGE DIAGNOSIS    Admission to acute inpatient rehab s/p CABG x 3.    Impairment group code: 9  Impaired functional mobility  Impaired ADLs    Patient Active Problem List   Diagnosis Code    Coronary artery disease due to lipid rich plaque I25.10, I25.83    Primary hypertension I10    Pure hypercholesterolemia E78.00    Rheumatic aortic stenosis I06.0    Obesity E66.9    Benign paroxysmal positional vertigo due to bilateral vestibular disorder H81.13    Hyperkalemia E87.5    Angina pectoris (H24) I20.9    CARRILLO (dyspnea on exertion) R06.09    Status post coronary angiogram Z98.890    Other ill-defined heart diseases I51.89    S/P CABG (coronary artery bypass graft) Z95.1        BRIEF SUMMARY    'Vega Zaldivar is a 78 year old man with past medical history of moderate aortic valve stenosis, CAD with prior PCI, Heart failure with preserved EF, HTN, HLD, obesity, who was admitted 2/14/24 for planned CABG x3, plan for avr but unable due to calcifications.  He was extubated 2/14/24.  Transferred out of ICU 2/16/24.  He had mild intermittent hypoxic respiratory failure felt to be 2/2 atelectasis.  Additionally had post operative A-fib which resolved after amiodarone administration.  He has mild ble edema admitted to aru on home lasix 40 mg bid.       Functionally noted to have impaired strength, impaired activity tolerance.  He is currently completing supine to sit transfer with min assist.  He is ambulating with min assist without assistive device and cga with walker.  He is completing stairs with cga-min assist.  He is toileting with stand by assist and cga for toiet transfer.  Goals for mod I- I with basic mobility and adls.     "  On arrival to rehab, he was doing well. He is [place] and wanted me to stand on his right side. His wife and daughter came towards the end of interview and were very nice and supportive.   He has had some pain around the incision lately since he started getting himself out of bed and holding/pulling the bedrail (but not at rest). Denied any focal weakness but noted that he is \"winded all the time\" and doesn't have enough energy. 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 Sleep is interrupted by nursing care but not primary issue. No paresthesia in hands and bilateral upper extremities. Swallowing is difficult because his throat is very dry; no changes in his memory and cognition. No problem with urination but he is constipated and feels bloated. ''        REHABILITAITON Course    PT:       Discharge Planner Post-Acute Rehab PT:      Discharge Plan: home w/ spouse, adult children     Precautions: falls, sternal     Current Status:  Bed Mobility: min A with bed flat and rail used  Transfer: supervision  Gait: no device in room, CGA; 4ww hallway weaning O2, [employer]/ O2 tank mgmt assist  Stairs: 4x6 steps in stairwell with seated rest between each bout; L rail, CGA,step-to pattern  Balance: stands w/o UE support     Outcome Measures:   Gait speed 2/23:        -w/o device: 0.3 m/s       -w/ 4ww: 0.43 m/s  6 Minute Walk Test on Feb 24: 107 m  HILL on Feb 25 44/56             OT:    Current Status:  ADLs:  Mobility: SBA without AD in room, SBA with 4WW for hallway  Grooming: SBA  Dressing: UB: IND pull over shirt LB: Min A Pants=reassess Footwear: Mod I slipper socks with sock aide, IND skecher slip on shoes, requires assist for compression stockings   Bathing: Min A seated on bench with hand held shower head  Toileting: SBA with grab bar  IADLs: completes cooking, grocery shopping, driving at baseline, anticipate will need increased assist " with heavy IADLs at discharge  Vision/Cognition: glasses + double vision at baseline, ACE III score 86/100--mild memory impairments     Assessment: focus to progress ADLs, reviewed sternal precautions with dressing, pt able to doff/don pull over polo shirt IND. Donned slip on skechers fshoes from chair without issue. Trialed donning socks with chair assist pt unable to on RLE. Was able to don with wide sock and and wider sock this tx. Reviewed benefit of continuing to use chair stretch to increase ROM for LB dsg.      Other Barriers to Discharge (DME, Family Training, etc):   Family training completed 2/27.  Has shower chair. Daughter provided with handout on reacher, sock aid, long handled sponge and reports will obtain.              MEDICAL COURSE    'Vega Zaldivar is a 78 year old man with past medical history of HTN, HLD, aortic stenosis, obesity, heart failure with preserved EF and CAD who was admitted for planned CABG & AVR, underwent 3 vessel coronary artery bypass 2/14/24 valve replacement was unable to be performed. Hospital course complicated by hypervolemia, atelectasis, acute blood loss anemia, and A-fib.  Functionally noted to have impaired strength, impaired balance, and impaired activity tolerance, he was admitted to rehab 2/22/24.         Admission to acute inpatient rehab s/p CABG x 3.    Impairment group code: 9        PT, OT 90 minutes of each on a daily basis up to 6 days per week, in addition to rehab nursing and close management of physiatrist.       Impairment of ADL's: Noted to have impaired strength, impaired activity tolerance, and impaired balance leading to decreased ability to independently complete ADL's.  Will benefit from ongoing OT with goal for MOD I with basic ADLs.      Impairment of mobility:  Noted to have impaired strength, impaired activity tolerance, and impaired balance leading to decreased mobility.  Will benefit from ongoing PT with goal for BHARATH with basic mobility.       Medical Conditions  CAD s/p CABG x 3    Completed 2/14/24 per Dr. Flanagan.   -sternal precautions x 8 weeks  -cleanse incision daily  -had 2 staples left on the right distal leg area - asked the nursing team to remove, continue monitor site  -asa 81  -continue crestor  -follow up cv surgery     Post operative A-fib  -started on amiodarone tapered down and admitted to aru on 200 mg daily x 21 days  -continue apixaban  -continue metoprolol  -follow up Dr. Michael as scheduled.      HF preserved EF  -continue metoprolol 37.5 mg bid  -continue lasix 40 mg bid (home dose)  -monitor weight and volume status daily      Hypoxic respiratory failure  Atelectasis  -mucinex bid prn  -duo nebs bid prn   -encourage IS  -monitored oxygen level- oxygen prn to keep sats >90%  -Patient required 1 LPM of O2 via Nasal canula at night during his stay. On 2/29 he had an O2 walk test in which he had an SPO2 of 96% while at rest and during activity.    -Per oxygen night test the patient desaturated below 88% for more than 5 minutes.  -Follow up with a sleep study (referral sent) to rule out causes for nocturnal hypoxia.     Acute blood loss anemia, remained stable  Hgb 13.9 pre-op down to 8.9 2/16/24 stable 9.0 2/20.  -2/26: 9.1  remained stable.     Pre Diabetes  Hgb A1C 5.9%  -trend with primary care     Polyneuropathy ?   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 This can be due to DM vs PAD vs idiopathic vs combination. Should follow up with PCP for basic screening blood work (SPEP, protein ASHLIE, B12 with refelct MMA, B6, B1 - A1c and TSH were checked recently) and possible NCS/EMG. Needs education on excellent skin/nail monitoring and care to avoid future complications.       Follow up Appointments on Discharge: pcp, cardiology, cv surgery, sleep medicine     Documentation of Face to Face and Certification for Home Oxygen     I certify that patient: Nick Zaldivar is under  my care and that I, or a nurse practitioner or physician's assistant working with me, had a face-to-face encounter that meets the physician face-to-face encounter requirements with this patient on: 3/1/2024.     This encounter with the patient was in whole, or in part, for the following medical condition, which is the primary reason for home health care: Overnight oxygen desaturation, nocturnal Hypoxia.     I certify that, based on my findings, the following services are medically necessary home health services:  Home Oxygen overnight .     My clinical findings support the need for the above services because:  The patient had oxygen levels below 88% overnight     Attending hospital physician (the Medicare certified [facility] provider): De Horta DO     Physician Signature: See electronic signature associated with these discharge orders.  Date: 3/1/2024     Documentation of Face to Face and Certification for Home Health Services     I certify that patient: Nick Zaldivar is under my care and that I, or a nurse practitioner or physician's assistant working with me, had a face-to-face encounter that meets the physician face-to-face encounter requirements with this patient on: 3/1/2024.     This encounter with the patient was in whole, or in part, for the following medical condition, which is the primary reason for home health care: Dyspnea on exertion.     I certify that, based on my findings, the following services are medically necessary home health services: Nursing, Occupational Therapy, and Physical Therapy.     My clinical findings support the need for the above services because: Nurse is needed: For complex aftercare of surgical procedures because the patient needs instruction and cannot perform care on their own due to: Dyspnea on exertion, sternal precautions. and To provide assessment and oversight required in the home to assure adherence to the medical plan due to: dyspnea on exertion, sternal  precautions.., Occupational Therapy Services are needed to assess and treat cognitive ability and address ADL safety due to impairment in ADLs due to sternal precautions and dyspnea on exertion., and Physical Therapy Services are needed to assess and treat the following functional impairments: mobility, strength, ROM.     Further, I certify that my clinical findings support that this patient is homebound (i.e. absences from home require considerable and taxing effort and are for medical reasons or [facility] services or infrequently or of short duration when for other reasons) because:  sternal precautions, dyspnea on exertion .     Based on the above findings. I certify that this patient is confined to the home and needs intermittent skilled nursing care, physical therapy and/or speech therapy.  The patient is under my care, and I have initiated the establishment of the plan of care.  This patient will be followed by a physician who will periodically review the plan of care.  Physician/Provider to provide follow up care: Clinic, [facility] physician (the Medicare certified [facility] provider): De Horta DO  Physician Signature: See electronic signature associated with these discharge orders.  Date: 3/1/2024 ''    DISCHARGE MEDICATIONS  Current Discharge Medication List        START taking these medications    Details   pantoprazole (PROTONIX) 40 MG EC tablet Take 1 tablet (40 mg) by mouth every morning (before breakfast) for 30 days  Qty: 30 tablet, Refills: 0    Associated Diagnoses: S/P CABG (coronary artery bypass graft)           CONTINUE these medications which have CHANGED    Details   amiodarone (PACERONE) 200 MG tablet Take 1 tablet (200 mg) by mouth daily for 30 days  Qty: 30 tablet, Refills: 0    Associated Diagnoses: S/P CABG (coronary artery bypass graft)      apixaban ANTICOAGULANT (ELIQUIS) 5 MG tablet Take 1 tablet (5 mg) by mouth 2 times daily for 30  days  Qty: 60 tablet, Refills: 0    Associated Diagnoses: S/P CABG (coronary artery bypass graft)      aspirin 81 MG EC tablet Take 1 tablet (81 mg) by mouth daily for 30 days  Qty: 30 tablet, Refills: 0    Associated Diagnoses: S/P CABG (coronary artery bypass graft)      furosemide (LASIX) 20 MG tablet Take 2 tablets (40 mg) by mouth 2 times daily for 30 days  Qty: 120 tablet, Refills: 0    Associated Diagnoses: Rheumatic aortic stenosis      Metoprolol Tartrate 37.5 MG TABS Take 37.5 mg by mouth 2 times daily for 30 days  Qty: 60 tablet, Refills: 0    Associated Diagnoses: S/P CABG (coronary artery bypass graft)      nystatin (MYCOSTATIN) 603899 UNIT/GM external cream Apply topically 2 times daily as needed for dry skin  Qty: 30 g, Refills: 0    Associated Diagnoses: S/P CABG (coronary artery bypass graft)      rosuvastatin (CRESTOR) 40 MG tablet Take 1 tablet (40 mg) by mouth at bedtime for 30 days  Qty: 30 tablet, Refills: 0    Associated Diagnoses: S/P CABG (coronary artery bypass graft)      senna-docusate (SENOKOT-S/PERICOLACE) 8.6-50 MG tablet Take 1 tablet by mouth 2 times daily as needed for constipation  Qty: 60 tablet, Refills: 0    Associated Diagnoses: S/P CABG (coronary artery bypass graft)           CONTINUE these medications which have NOT CHANGED    Details   acetaminophen (TYLENOL) 325 MG tablet Take 2 tablets (650 mg) by mouth every 4 hours as needed for other (For optimal non-opioid multimodal pain management to improve pain control.)    Associated Diagnoses: S/P CABG (coronary artery bypass graft)      clotrimazole (LOTRIMIN) 1 % external cream Apply topically 2 times daily      guaiFENesin (MUCINEX) 600 MG 12 hr tablet Take 1 tablet (600 mg) by mouth 2 times daily as needed for congestion    Associated Diagnoses: S/P CABG (coronary artery bypass graft)      magnesium hydroxide (MILK OF MAGNESIA) 400 MG/5ML suspension Take 30 mLs by mouth daily as needed for constipation (Use if preventive  measures (senna-docusate, docusate, and polyethylene glycol) are not effective.)    Associated Diagnoses: S/P CABG (coronary artery bypass graft)      multivitamin w/minerals (THERA-VIT-M) tablet Take 1 tablet by mouth daily      nitroGLYcerin (NITROSTAT) 0.4 MG sublingual tablet See Admin Instructions           STOP taking these medications       ibuprofen (ADVIL/MOTRIN) 400 MG tablet Comments:   Reason for Stopping:                 DISCHARGE INSTRUCTIONS AND FOLLOW UP  Discharge Procedure Orders   Adult Sleep Eval & Management Referral   Standing Status: Future   Referral Priority: Routine: Next available opening Referral Type: Consultation   Number of Visits Requested: 1     Home Care Referral   Referral Priority: Routine: Next available opening Referral Type: Home Health Therapies & Aides   Number of Visits Requested: 1     Home Care Referral   Referral Priority: Routine: Next available opening Referral Type: Home Health Therapies & Aides   Number of Visits Requested: 1     Reason for your hospital stay   Order Comments: Post CABG     Activity   Order Comments: Your activity upon discharge: activity as tolerated and no driving      As tolerated with sternal precautions in mind (see below). No driving for 4 weeks or while on pain medication.      - Shower and wash your incisions daily with soap and water. No tub baths/hot tubs for 4 weeks. An antibacterial soap such as Dial or Safeguard is recommended.     - Check your incisions every day. If you notice any redness, drainage, or anything unusual, please call the surgeons office.     - No driving for 4 weeks after surgery or while on pain medication      - Do not lift anything more than 10 pounds for 8 weeks after surgery. After 8 weeks, advance lifting gradually as tolerated.     - You may have watery drainage from your chest tube site for 2-3 weeks after surgery. Your may cover with a Band-Aid to protect your clothing. Remove the Band-Aid every day and wash the  "site.     - If you have a leg lesion, you may have swelling for 2-3 months. Elevate your leg any time you are not walking.     - If you feel any \"popping\" or \"clicking\" sensations in your chest, your arms are out too far or you are putting too much weight into arm movements. Do not reach over your head or out to the side to pull something. Do not do any arm exercises or use any exercise equipment that involves arm movement. If you feel your sternum moving, call the surgeon's office.     - Increase your daily activity as explained by Cardiac Rehab. You are encouraged to enroll in an Outpatient Cardiac Rehab Program.     - No active sports using your upper arms for 3 months. This includes fishing, hunting, bowling, swimming, tennis or golf.     - No physical activity such as cutting the grass, raking, vacuuming, changing sheets on your bed, snow shoveling, or using a  for 3 months.     - Use incentive spirometer 6-8 times per day for 2 weeks.     Order Specific Question Answer Comments   Is discharge order? Yes      Adult [facility] Follow-up and recommended labs and tests   Order Comments: Follow up with primary care provider, [facility], within 7 days for hospital follow- up.      Appointments on [place] and/or [facility] (with [facility] or [facility] provider or service). Call [phone] if you haven't heard regarding these appointments within 7 days of discharge.     Oxygen Order   Order Comments: Oxygen Documentation  I certify that this patient, Nick Zaldivar has been under my care (or a nurse practitioner or physician's assistant working with me). This is the face-to-face encounter for oxygen medical necessity.      At the time of this encounter, I have reviewed the qualifying testing and have determined that supplemental oxygen is reasonable and necessary and is expected to improve the patient's condition in a home setting.       Patient has continued oxygen desaturation due to " nocturnal hypoxia.    If portability is ordered, is the patient mobile within the home? yes     Order Specific Question Answer Comments   Medical Equipment (DME) Supplier: [facility]    PATIENT INSTRUCTIONS: If you did not receive this ordered item today, please contact [facility] [facility] for availability ([place] Locations: [phone], [place]: [phone]).    Start Date: 3/1/2024    Type: New/Recertification    Oxygen Consult Reqt: Call [employer] [facility] before patient leaves at [phone]    Did the patient have SpO2 (sat) testing (only needed for new oxgyen or liter flow changes)? Yes    Length of Need: Lifetime    Frequency of Use: Nocturnal    Mode of Delivery - Nocturnal Nasal Cannula    Liter Flow - Nocturnal (LPM): 1-2 LPM    Need for Portable Oxygen Equipment: Yes    Evaluate for Conserving Device: Yes    Maintain Sats >= 90%    The face to face evaluation was performed on: 3/1/2024    Peak Flow Meter: Yes      Diet   Order Comments: Follow this diet upon discharge: Orders Placed This Encounter      Room Service      Combination Diet Regular Diet     Order Specific Question Answer Comments   Is discharge order? Yes           PHYSICAL EXAMINATION    Most recent Vital Signs:   Vitals:    24 0651 24 0746 24 0835 24 1548   BP: 131/65 (P) 132/70 117/59 131/67   BP Location: Left arm (P) Left arm Left arm Left arm   Patient Position:   Chair Semi-Juarez's   Cuff Size:    Adult Regular   Pulse: 72  74 73   Resp: 16   16   Temp: 98  F (36.7  C)   97.8  F (36.6  C)   TempSrc: Oral   Oral   SpO2: 92% (P) 95% 94% 95%   Weight: 103.2 kg (227 lb 9.6 oz)      Height:         Gen: NAD, sitting up in chair  HEENT: NCAT, EOMI,    Cardio: well perfused  Pulm: nonlabored, symmetrical chest rise  Abd: protuberant, nontender,   Ext: there is edema in B/L LE, non tender   Neuro/MSK: AAOx3, full AROM thoughout          Discharge summary was forwarded to  Clinic, [facility] (PCP) at the time of discharge, so as to bridge from hospital to outpatient care.     It was our pleasure to care for Nick Zaldivar during this hospitalization. Please do not hesitate to contact me should there be questions regarding the hospital course or discharge plan.        Jessie Farrell MD  Physical Medicine & Rehabilitation     I spent a total of 30 minutes face-to-face and managing the care of the patient. Over 50% of my time on the unit was spent counseling the patient and coordinating care. See note for details.

## 2024-03-01 NOTE — PLAN OF CARE
Discharge Planner Post-Acute Rehab OT:     Discharge Plan: home with assist for heavy IADLs, OP CR    Precautions: fall, sternal, O2 for activity    Current Status:  ADLs:  Mobility: mod I without AD in room, SBA with 4WW for hallway  Grooming:mod I  Dressing: UB: IND pull over shirt LB: mod I Footwear: Mod I slipper socks with sock aide, IND skecher slip on shoes, requires assist for compression stockings   Bathing:mod I seated on bench with hand held shower head standng for allyn cares mod I with rail.walk in shower transfer sba side stepping over 4 inch lip  Toileting: mod I handicap height toilet with grab bar  IADLs: completes cooking, grocery shopping, driving at baseline, anticipate will need increased assist with heavy IADLs at discharge  Vision/Cognition: glasses + double vision at baseline, ACE III score 86/100--mild memory impairments    Assessment: increased shower to mod I, sba shower trasnfer. ot focus on family education with daughter. Daughter educated ion techniques for dressing leg ex to incresae l/b derssing energy conservation techniques and plb techniques with sence of smell to increase more automatic breathing and transfers to lower surfaces and bed transfer daughter ordered bed wedge with pt needing hob raised due to feeling sob, training completed with daughter able to verbalize understanding in all areas discussed darien for d/c with home carept has all equipment needed bathbench long handelded equipment and comfort height 17 inch toilet. pt r   Other Barriers to Discharge (DME, Family Training, etc):   Family training completed 3-1.ot focus on family education with daughter daughter educated ion techniques for dressing leg ex to incresae l/b derssing energy conservation techniques and plb techniques with sence of smell to increase more automatic breathing and transfers to lower surfaces and bed transfer daughter ordered bed wedge with pt needing hob raised due to feeling sob, training  completed with daughter able to verbalize understanding in all areas discussed. pt ready for d/c with home care

## 2024-03-01 NOTE — PLAN OF CARE
Physical Therapy Discharge Summary    Reason for therapy discharge:    Discharged to home with home therapy.    Progress towards therapy goal(s). See goals on Care Plan in [facility] electronic health record for goal details.  Goals met    Therapy recommendation(s):    Continued therapy is recommended.  Rationale/Recommendations:  Has met goals to allow for safe discharge home; ongoing therapies for progressing community mobility, activity tolerance, gait speed, functional balance.

## 2024-03-01 NOTE — PLAN OF CARE
Goal Outcome Evaluation:      Plan of Care Reviewed With: patient    Overall Patient Progress: improvingOverall Patient Progress: improving    Outcome Evaluation: Alert and oriented x4. VSS. Denies of pain/SOB/ Nause and vomiting. O2 sat stable. Patient on oximetry study. Patient continent of bladder. No BM since Monday. PRN bisacodyl supp given with large hard BM. Patient on sternal precaution. Edematous legs. Refused to elevate legs. Tubi  off. Skin: pls see flowsheet.

## 2024-03-01 NOTE — PROGRESS NOTES
[facility]   Met/spoke with patient/spouse/family to discuss plans for home care services at discharge. Discharge 3/2/24.Patient agrees to have [facility] provide SN/PT/OT services.  Patient care support center processing referral.  Patient verbalized understanding that they will receive a phone call from [facility] to schedule initial home care visit. Anticipated start of care date is Monday 3/4/24. Patient/family has 24 hour phone number for [facility] to call with any questions or concerns.

## 2024-03-01 NOTE — PROGRESS NOTES
[facility]   Acute Rehabilitation Unit  Daily progress note    INTERVAL HISTORY  Nursing notes reviewed, no acute events overnight. Had overnight oxygen study.      Today the patient is sitting up in his chair, he states to have had a bad night due to having trouble sleeping, he states this happens from time to time and that at home he usually would go down to his living room and sleep at his sofa. The patient is awaiting results to see if he would need oxygen upon discharge.      Otherwise the patient denies headaches, dizziness, lightheadedness, denies nausea or vomiting, shortness of breath, chest pain, abdominal pain or pain anywhere else at this time. Patient also denies new bowel or bladder issues. There were no further concerns at this time.        Functional  Per PT 2/29  Current Status:  Bed Mobility: min A with bed flat and rail used  Transfer: supervision  Gait: no device in room, CGA; 4ww hallway weaning O2, [employer]/ O2 tank mgmt assist  Stairs: 4x6 steps in stairwell with seated rest between each bout; L rail, CGA,step-to pattern  Balance: stands w/o UE support     Outcome Measures:   Gait speed 2/23:        -w/o device: 0.3 m/s       -w/ 4ww: 0.43 m/s  6 Minute Walk Test on Feb 24: 107 m  HILL on Feb 25 44/56    ROS: 10 point ROS neg other than the symptoms noted above in the HPI.          MEDICATIONS  Scheduled meds   amiodarone  200 mg Oral Daily    apixaban ANTICOAGULANT  5 mg Oral BID    aspirin  81 mg Oral Daily    furosemide  40 mg Oral BID    lidocaine  1 patch Transdermal Q24H    metoprolol tartrate  37.5 mg Oral BID    multivitamin w/minerals  1 tablet Oral Daily    nystatin   Topical BID    pantoprazole  40 mg Oral QAM AC    rosuvastatin  40 mg Oral At Bedtime    senna-docusate  1 tablet Oral BID       PRN meds:  acetaminophen, bisacodyl, guaiFENesin, ipratropium - albuterol 0.5 mg/2.5 mg/3 mL, magnesium hydroxide, nitroGLYcerin, - MEDICATION  "INSTRUCTIONS -, polyethylene glycol      PHYSICAL EXAM  /59 (BP Location: Left arm, Patient Position: Chair)   Pulse 74   Temp 98  F (36.7  C) (Oral)   Resp 16   Ht 1.778 m (5' 10\")   Wt 103.2 kg (227 lb 9.6 oz)   SpO2 94%   BMI 32.66 kg/m    Gen: NAD, sitting up in chair  HEENT: NCAT, EOMI,    Cardio: well perfused  Pulm: nonlabored, symmetrical chest rise  Abd: protuberant, nontender,   Ext: there is edema in B/L LE, non tender   Neuro/MSK: AAOx3, full AROM thoughout      LABS    Lab Results   Component Value Date    WBC 10.5 02/20/2024     Lab Results   Component Value Date    RBC 3.02 02/20/2024     Lab Results   Component Value Date    HGB 9.0 02/20/2024     Lab Results   Component Value Date    HCT 28.2 02/20/2024     Lab Results   Component Value Date    MCV 93 02/20/2024     Lab Results   Component Value Date    MCH 29.8 02/20/2024     Lab Results   Component Value Date    MCHC 31.9 02/20/2024     Lab Results   Component Value Date    RDW 14.0 02/20/2024     Lab Results   Component Value Date     02/21/2024     Last Comprehensive Metabolic Panel:  Lab Results   Component Value Date     02/26/2024    POTASSIUM 4.3 02/26/2024    CHLORIDE 100 02/26/2024    CO2 31 (H) 02/26/2024    ANIONGAP 5 (L) 02/26/2024    GLC 92 02/26/2024    BUN 20.9 02/26/2024    CR 1.08 02/26/2024    GFRESTIMATED 70 02/26/2024    BUBBA 8.5 (L) 02/26/2024           ASSESSMENT AND PLAN  Nick Zaldivar is a 78 year old man with past medical history of HTN, HLD, aortic stenosis, obesity, heart failure with preserved EF and CAD who was admitted for planned CABG & AVR, underwent 3 vessel coronary artery bypass 2/14/24 valve replacement was unable to be performed. Hospital course complicated by hypervolemia, atelectasis, acute blood loss anemia, and A-fib.  Functionally noted to have impaired strength, impaired balance, and impaired activity tolerance, he was admitted to rehab 2/22/24.         Admission to acute " inpatient rehab s/p CABG x 3.    Impairment group code: 9        PT, OT 90 minutes of each on a daily basis up to 6 days per week, in addition to rehab nursing and close management of physiatrist.       Impairment of ADL's: Noted to have impaired strength, impaired activity tolerance, and impaired balance leading to decreased ability to independently complete ADL's.  Will benefit from ongoing OT with goal for MOD I with basic ADLs.      Impairment of mobility:  Noted to have impaired strength, impaired activity tolerance, and impaired balance leading to decreased mobility.  Will benefit from ongoing PT with goal for BHARATH with basic mobility.      Medical Conditions  CAD s/p CABG x 3    Completed 2/14/24 per Dr. Flanagan.   -sternal precautions x 8 weeks  -cleanse incision daily  -had 2 staples left on the right distal leg area - asked the nursing team to remove, continue monitor site  -asa 81  -continue crestor  -follow up cv surgery     Post operative A-fib  -started on amiodarone tapered down and admitted to aru on 200 mg daily x 21 days  -continue apixaban  -continue metoprolol  -follow up Dr. Michael as scheduled.      HF preserved EF  -continue metoprolol 37.5 mg bid  -continue lasix 40 mg bid (home dose)  -monitor weight and volume status daily      Hypoxic respiratory failure  Atelectasis  -mucinex bid prn  -duo nebs bid prn   -encourage IS  -monitor oxygen level- oxygen prn to keep sats >90%  -Patient has required 1 LPM of O2 via Nasal canula at night. On 2/29 he had an O2 walk test with therapies and a test for overnight oxygen saturation was ordered.  -The patient was able to complete walk test with PT and had an SPO2 of 96% while at rest and during activity.  -Per oxygen night test the patient desaturated below 88% for more than 5 minutes. Oxygen DME ordered.  -Sleep medicine referral sent to rule out other etiologies for nocturnal hypoxia     Acute blood loss anemia  Hgb 13.9 pre-op down to 8.9 2/16/24  stable 9.0 2/20.  -2/26: 9.1      Pre Diabetes  Hgb A1C 5.9%  -trend with primary care     Polyneuropathy ?   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 This can be due to DM vs PAD vs idiopathic vs combination. Should follow up with PCP for basic screening blood work (SPEP, protein ASHLIE, B12 with refelct MMA, B6, B1 - A1c and TSH were checked recently) and possible NCS/EMG. Needs education on excellent skin/nail monitoring and care to avoid future complications.        Adjustment to disability:  Clinical psychology to eval and treat as indicated  FEN: reg   Bowel: monitor; he is constipated so consider increasing senokot dose and scheduling miralax pending his course   Bladder: monitor  DVT Prophylaxis: apixaban  GI Prophylaxis: PPI  Code: full - confirmed upon admission   Disposition: goal for home  ELOS:  one week to 10 days  - impaired endurance and stairs at home are main barriers. Might need regular rehab and then cardiac rehab due to poor endurance.    Tentative date for discharge is 3/2  Rehab prognosis:  good  Follow up Appointments on Discharge: pcp, cardiology, cv surgery    Documentation of Face to Face and Certification for Home Oxygen    I certify that patient: Nick Zaldivar is under my care and that I, or a nurse practitioner or physician's assistant working with me, had a face-to-face encounter that meets the physician face-to-face encounter requirements with this patient on: 3/1/2024.    This encounter with the patient was in whole, or in part, for the following medical condition, which is the primary reason for home health care: Overnight oxygen desaturation, nocturnal Hypoxia.    I certify that, based on my findings, the following services are medically necessary home health services:  Home Oxygen overnight .    My clinical findings support the need for the above services because:  The patient had oxygen levels below 88%  overnight    Attending hospital physician (the Medicare certified [facility] provider): De Horta DO    Physician Signature: See electronic signature associated with these discharge orders.  Date: 3/1/2024     Documentation of Face to Face and Certification for Home Health Services    I certify that patient: Nick Zaldivar is under my care and that I, or a nurse practitioner or physician's assistant working with me, had a face-to-face encounter that meets the physician face-to-face encounter requirements with this patient on: 3/1/2024.    This encounter with the patient was in whole, or in part, for the following medical condition, which is the primary reason for home health care: Dyspnea on exertion.    I certify that, based on my findings, the following services are medically necessary home health services: Nursing, Occupational Therapy, and Physical Therapy.    My clinical findings support the need for the above services because: Nurse is needed: For complex aftercare of surgical procedures because the patient needs instruction and cannot perform care on their own due to: Dyspnea on exertion, sternal precautions. and To provide assessment and oversight required in the home to assure adherence to the medical plan due to: dyspnea on exertion, sternal precautions.., Occupational Therapy Services are needed to assess and treat cognitive ability and address ADL safety due to impairment in ADLs due to sternal precautions and dyspnea on exertion., and Physical Therapy Services are needed to assess and treat the following functional impairments: mobility, strength, ROM.    Further, I certify that my clinical findings support that this patient is homebound (i.e. absences from home require considerable and taxing effort and are for medical reasons or Alev[employer] services or infrequently or of short duration when for other reasons) because:  sternal precautions, dyspnea on exertion .    Based on the above findings. I  certify that this patient is confined to the home and needs intermittent skilled nursing care, physical therapy and/or speech therapy.  The patient is under my care, and I have initiated the establishment of the plan of care.  This patient will be followed by a physician who will periodically review the plan of care.  Physician/Provider to provide follow up care: Anselmo, [facility] physician (the [facility]): De Horta DO  Physician Signature: See electronic signature associated with these discharge orders.  Date: 3/1/2024       Patient seen and discussed with my attending Dr Mychal Butterfield MD   Resident Physician PGY-2  Physical Medicine and Rehabilitation  03/01/2024 11:59 AM

## 2024-03-01 NOTE — PLAN OF CARE
Goal Outcome Evaluation:      Plan of Care Reviewed With: patient    Overall Patient Progress: improvingOverall Patient Progress: improving    Outcome Evaluation: Alert and oriented x 4. Vitals stable. Denied any pain. Family (daughter and wife) at bedside this afternoon. Daughter educated on incision cares and s/sx of infection to monitor. CT sites scabbed and sternal & norman medial knee incisions, NILS and healing well. Right lower leg dressing changed. Pt mod i in room. Good appetite at dinner. Continent bowel and bladder. LBM 2/26/24.

## 2024-03-01 NOTE — PROGRESS NOTES
"Walk test completed yesterday by PT. Pt on RA with activity and no O2 needed with rest or activity. JOHANN completed last night. Pt will qualify for overnight O2. Order and F2F completed by MD. JOHANN, order, and F2F, along with face sheet, faxed to  DME for processing.     Received a call from  DME and they have everything that they need.  DME contacting pt and family to coordinate and nothing else needed from writer.     Met with pt, pt wife, and pt dtr at bedside. Provided pt with update on [facility] HC acceptance and overnight O2 set up.  DME information in pt AVS. Pt denied questions or concerns and expressed understanding. IRF and IMM completed with pt. Pt planning to discharge home tomorrow, Sat 03/02/24. Family ride home. No additional SW needs identified.     Home Health Care:   [facility] PH: [phone] (previously known as: [facility])  Nurse, physical therapy, occupational therapy    Home Oxygen:   [facility]urable Medical Equipment (DME)  PH: [phone] x 2 x 1  Fax: [phone]    Located within [facility] Pain Assessment  Pain Effect on Sleep  \"Over the past 5 days, how much of the time has pain made it hard for you to sleep at night?\"    0. Does not apply - I have not had any pain or hurting in the past 5 days     Shakila George [facility] Acute Inpatient Rehab    PH: [phone] & Fax: [phone]  Email: [email or web address]             "

## 2024-03-02 VITALS
BODY MASS INDEX: 32.58 KG/M2 | WEIGHT: 227.6 LBS | SYSTOLIC BLOOD PRESSURE: 117 MMHG | RESPIRATION RATE: 16 BRPM | DIASTOLIC BLOOD PRESSURE: 64 MMHG | TEMPERATURE: 97.8 F | HEART RATE: 75 BPM | HEIGHT: 70 IN | OXYGEN SATURATION: 97 %

## 2024-03-02 PROCEDURE — 99238 HOSP IP/OBS DSCHRG MGMT 30/<: CPT | Performed by: STUDENT IN AN ORGANIZED HEALTH CARE EDUCATION/TRAINING PROGRAM

## 2024-03-02 PROCEDURE — 250N000013 HC RX MED GY IP 250 OP 250 PS 637: Performed by: PHYSICAL MEDICINE & REHABILITATION

## 2024-03-02 PROCEDURE — 250N000013 HC RX MED GY IP 250 OP 250 PS 637: Performed by: PHYSICIAN ASSISTANT

## 2024-03-02 RX ADMIN — PANTOPRAZOLE SODIUM 40 MG: 40 TABLET, DELAYED RELEASE ORAL at 06:53

## 2024-03-02 RX ADMIN — FUROSEMIDE 40 MG: 40 TABLET ORAL at 07:58

## 2024-03-02 RX ADMIN — ASPIRIN 81 MG: 81 TABLET, COATED ORAL at 07:59

## 2024-03-02 RX ADMIN — SENNOSIDES AND DOCUSATE SODIUM 1 TABLET: 8.6; 5 TABLET ORAL at 07:59

## 2024-03-02 RX ADMIN — Medication 1 TABLET: at 07:59

## 2024-03-02 RX ADMIN — Medication 37.5 MG: at 07:58

## 2024-03-02 RX ADMIN — APIXABAN 5 MG: 5 TABLET, FILM COATED ORAL at 07:59

## 2024-03-02 RX ADMIN — AMIODARONE HYDROCHLORIDE 200 MG: 200 TABLET ORAL at 07:58

## 2024-03-02 RX ADMIN — LIDOCAINE 1 PATCH: 4 PATCH TOPICAL at 07:59

## 2024-03-02 ASSESSMENT — ACTIVITIES OF DAILY LIVING (ADL)
ADLS_ACUITY_SCORE: 24

## 2024-03-02 NOTE — PLAN OF CARE
Goal Outcome Evaluation:    Overall Patient Progress: no change    Outcome Evaluation: No change in Pt progress this shift.    Pt is alert and oriented. On 1L of oxygen via nasal cannula during NOC. Continent of B&B. LBM 2/29. Mod I in room with walker. Denied pain, SOB, CP, and n/t. Call light within reach. Pt is able to make needs known and slept through majority of the shift. Tubigrips on at the end of the shift. Planning for discharge 3/2. Will continue with POC.

## 2024-03-02 NOTE — PLAN OF CARE
Goal Outcome Evaluation:      Plan of Care Reviewed With: patient    Overall Patient Progress: improving    Outcome Evaluation: Pt knowledgable of meds, no change to skin, continues to be safe IND in room    FOCUS/GOAL  Discharge planning    ASSESSMENT, INTERVENTIONS AND CONTINUING PLAN FOR GOAL:  Pt Aox4, using call light to make needs known. IND in room, BHARATH hallways.  Denies pain, cough, sob, chest pain, dizziness, n/v.  Pt wearing 1 LPM via NC overnight.  Cont of B&B, LBM 2/29.  Reg/thin, taking pills whole with water.  Working with therapies.  Nursing will continue with POC.

## 2024-03-02 NOTE — PLAN OF CARE
Goal Outcome Evaluation: pt alert and oriented x4. Able to make needs known.Continent of bowel and bladder. LBM 3/2. MOD I in the room. Pt discharged today home with his medications. Discharge instructions given to patient and daughter. Assist x1 to the car with no difficulties.

## 2024-03-04 ENCOUNTER — PATIENT OUTREACH (OUTPATIENT)
Dept: CARE COORDINATION | Facility: CLINIC | Age: 79
End: 2024-03-04
Payer: MEDICARE

## 2024-03-04 NOTE — PROGRESS NOTES
Clinic Care Coordination Contact  [facility]: Post-Discharge Note  SITUATION                                                      Admission:    Admission Date: 02/22/24   Reason for Admission: Coronary artery disease s/p Triple vessel coronary artery bypass grafting; Separate reversed saphenous vein grafts to right posterior descending, the obtuse marginal 1 and the left anterior descending diagonal branch coronary arteries.  Discharge:   Discharge Date: 03/02/24  Discharge Diagnosis: Admission to acute inpatient rehab s/p CABG x 3: 1. Impairment group code: 9. 1. Impaired functional mobility  2. Impaired ADLs    BACKGROUND                                                      Per hospital discharge summary and inpatient provider notes:    Nick Zaldivar is a 78 year old man with past medical history of moderate aortic valve stenosis, CAD with prior PCI, Heart failure with preserved EF, HTN, HLD, obesity, who was admitted 2/14/24 for planned CABG x3, plan for avr but unable due to calcifications.  He was extubated 2/14/24.  Transferred out of ICU 2/16/24.  He had mild intermittent hypoxic respiratory failure felt to be 2/2 atelectasis.  Additionally had post operative A-fib which resolved after amiodarone administration.  He has mild ble edema admitted to aru on home lasix 40 mg bid.       Functionally noted to have impaired strength, impaired activity tolerance.  He is currently completing supine to sit transfer with min assist.  He is ambulating with min assist without assistive device and cga with walker.  He is completing stairs with cga-min assist.  He is toileting with stand by assist and cga for toiet transfer.  Goals for mod I- I with basic mobility and adls.      On arrival to rehab, he was doing well. He is [place] and wanted me to stand on his right side. His wife and daughter came towards the end of interview and were very nice and supportive.   He has had some pain around the incision lately since he  "started getting himself out of bed and holding/pulling the bedrail (but not at rest). Denied any focal weakness but noted that he is \"winded all the time\" and doesn't have enough energy. 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 Sleep is interrupted by nursing care but not primary issue. No paresthesia in hands and bilateral upper extremities. Swallowing is difficult because his throat is very dry; no changes in his memory and cognition. No problem with urination but he is constipated and feels bloated.     ASSESSMENT      Discharge Assessment  How are you doing now that you are home?: \"He is doing okay he is doing much better now that he is home. He is pretty weak but he is walking and we have the home health nurse here and we are going to  How are your symptoms? (Red Flag symptoms escalate to triage hotline per guidelines): Improved  Do you feel your condition is stable enough to be safe at home until your provider visit?: Yes  Does the patient have their discharge instructions? : Yes  Does the patient have questions regarding their discharge instructions? : No  Were you started on any new medications or were there changes to any of your previous medications? : Yes  Does the patient have all of their medications?: Yes  Do you have questions regarding any of your medications? : No  Do you have all of your needed medical supplies or equipment (DME)?  (i.e. oxygen tank, CPAP, cane, etc.): Yes  Discharge follow-up appointment scheduled within 14 calendar days? : Yes  Discharge Follow Up Appointment Date: 03/12/24  Discharge Follow Up Appointment Scheduled with?: Specialty Care Provider (Cardiology appt 3/12. PCP appt 3/15.)    Post-op ([facility] CTA Only)  If the patient had a surgery or procedure, do they have any questions for a nurse?: No    PLAN                                                      Outpatient Plan:      Adult [facility] Follow-up " and recommended labs and tests  Follow up with primary care provider, [facility], within 7 days for hospital follow- up.    Appointments on [place] and/or [facility] (with [facility] or [facility] provider or service). Call [phone] if you haven't heard regarding these appointments within 7 days of discharge.    Additional Services:   Adult Sleep Eval & Management Referral  Please be aware that coverage of these services is subject to the terms and limitations of your health insurance plan.  Call member services at your health plan with any benefit or coverage questions.   [employer] will call you to coordinate your care as prescribed by your provider. If you don't hear from a representative within 2 business days, please call [phone].  Reason for Referral: Sleep Apnea  Insomnia  Scheduling Instructions: [employer] will call you to coordinate your care as prescribed by your provider. If you don't hear from a representative within 2 business days, please call [phone].  Additional Information: patient desaturated overnight and was below 88% of O2 for a little bit less than 2 hrs. O2 walk test passed. overnight oxygen test (no pass)    Home Care Referral  Reason for Referral: Skilled Nursing  Physical Therapy  Physical Therapy Eval and Treat for: Gait Training  Home Safety Assessment  Range of Motion  Therapeutic Exercise  Transfer Training  Lymphedema (OT can eval and treat based on availability)  Skilled Nursing Eval and Treat for: Wound assessment and care  Disease management  Complex medication teaching and monitoring  Additional Services Needed: Occupational Therapy  Occupational Therapy Eval and Treat for: ADLs  Home Safety  Bathing  Adaptive Therapy  Is the patient homebound?: Yes  Homebound Status (describe the functional limitations that support this patient is confined to his/her home. Medicaid  recipients are not required to be homebound.): Dyspnea on exertion that  makes it unsafe to leave home without clinical deterioration  I attest that I saw or will see the patient on this date: 3/1/2024  Provider to follow patient: SILAS [facility]    Home Care Referral  Your provider has ordered home health services. If you have not been contacted within 2 days of your discharge please  call the selected Home Care agency listed on your Discharge document. If a Home Care agency is NOT listed, please call [phone].  Reason for Referral: Skilled Nursing  Physical Therapy  Physical Therapy Eval and Treat for: Gait Training  Home Safety Assessment  Range of Motion  Therapeutic Exercise  Transfer Training  Skilled Nursing Eval and Treat for: Complex aftercare  Complex medication teaching and monitoring  Disease management  Wound assessment and care  Caregiver training  Additional Services Needed: Occupational Therapy  Home Health Aide  Occupational Therapy Eval and Treat for: ADLs  Adaptive Therapy  Home Safety  Bathing  Is the patient homebound?: Yes  Homebound Status (describe the functional limitations that support this patient is confined to his/her home. Medicaid recipients are not required to be homebound.): Dyspnea on exertion that makes it unsafe to leave home without clinical  deterioration  I attest that I saw or will see the patient on this date: 3/1/2024  Provider to follow patient: SILAS [facility]    Future Appointments   Date Time Provider Department Center   3/12/2024 12:00 PM [facility]   3/21/2024 12:50 PM [facility]el Abdul [facility]   3/25/2024  4:20 PM Jas Hope MD [facility]         For any urgent concerns, please contact our 24 hour nurse triage line: [phone] ([phone])         CHACHO Marin  [phone]  [facility]

## 2024-03-12 ENCOUNTER — OFFICE VISIT (OUTPATIENT)
Dept: CARDIOLOGY | Facility: CLINIC | Age: 79
End: 2024-03-12
Payer: MEDICARE

## 2024-03-12 VITALS
RESPIRATION RATE: 16 BRPM | DIASTOLIC BLOOD PRESSURE: 64 MMHG | BODY MASS INDEX: 33 KG/M2 | OXYGEN SATURATION: 95 % | HEART RATE: 72 BPM | WEIGHT: 230 LBS | SYSTOLIC BLOOD PRESSURE: 118 MMHG

## 2024-03-12 DIAGNOSIS — Z95.1 S/P CABG (CORONARY ARTERY BYPASS GRAFT): Primary | ICD-10-CM

## 2024-03-12 PROCEDURE — 99024 POSTOP FOLLOW-UP VISIT: CPT

## 2024-03-12 NOTE — PATIENT INSTRUCTIONS
- Surgically doing well. Incisions are healing well with no signs of infection.  - Hemodynamics are stable.  - Follow up with your cardiology as scheduled (03/21 and 3/25)  - Continue Cardiac Rehab until completed.   - May start driving tomorrow, 03/13 (4 weeks post-op) if not using narcotic pain medications.  - Continue strict sternal precautions until 04/10/2024. No lifting >10bs; may gradually increase at this point (8 weeks post-op).   - Take 40 mg lasix by mouth THREE times a day for THREE days, then go back to 40 mg TWICE a day  - No need for further follow-up with CV surgery unless concerns. Feel free to call our office with questions. [phone]

## 2024-03-12 NOTE — PROGRESS NOTES
CARDIOTHORACIC SURGERY FOLLOW-UP VISIT     Nick Zaldivar   1945   [id number]      Reason for visit: Post operative clinic visit. Patient underwent CABGx3 with Dr. Flanagan on 02/14/2024.     HPI: Nick Zaldivar is a 78 year old year old male seen in clinic for a routine follow-up appointment after surgery. Patient has past medical history as below. Hospital course was remarkable for post-op a-fib. Patient was discharged to ARU and subsequently home 3/1.    Patient has been doing overall well since discharge. Patient did not yet follow-up with primary care since leaving the hospital although has an appointment in a few days. Reports that incision is healing well. Denies fevers, peripheral edema. Appetite is improving and patient is voiding without difficulty. Weight has been stable. Pain management at this point with no medication. Patient has not yet been attending cardiac rehab but is scheduled to begin. Patient is on Eliquis for a-fib with appropriate follow-up in place.    PAST MEDICAL HISTORY:  Past Medical History:   Diagnosis Date    Aortic stenosis     Coronary artery disease     Hypertension        PAST SURGICAL HISTORY:  Past Surgical History:   Procedure Laterality Date    CORONARY ARTERY BYPASS GRAFT, WITH AORTIC VALVE REPLACEMENT, WITH ENDOSCOPIC VESSEL PROCUREMENT N/A 2/14/2024    Procedure: CORONARY ARTERY BYPASS GRAFT TIMES THREE, LEFT INTERNAL MAMMARY ARTERY HARVEST, BILATERAL LEG ENDOSCOPIC VESSEL PROCUREMENT, EPIAORTIC ULTRASOUND;  Surgeon: Carmelina Flanagan MD;  Location: [facility] Main OR    CV CORONARY ANGIOGRAM N/A 1/22/2024    Procedure: Coronary Angiogram;  Surgeon: Harman Ring MD;  Location: [facility] CATH [facility] CV    CV RIGHT HEART CATH MEASUREMENTS RECORDED N/A 1/22/2024    Procedure: Right Heart Catheterization;  Surgeon: Harman Ring MD;  Location: [facility] CATH LAB CV    TRANSESOPHAGEAL ECHOCARDIOGRAM INTRAOPERATIVE N/A 2/14/2024    Procedure: ANESTHESIA TRANSESOPHAGEAL  ECHOCARDIOGRAM;  Surgeon: Carmelina Flanagan MD;  Location: [facility] Main OR       CURRENT MEDICATIONS:     Current Outpatient Medications:     acetaminophen (TYLENOL) 325 MG tablet, Take 2 tablets (650 mg) by mouth every 4 hours as needed for other (For optimal non-opioid multimodal pain management to improve pain control.), Disp: , Rfl:     amiodarone (PACERONE) 200 MG tablet, Take 1 tablet (200 mg) by mouth daily for 30 days, Disp: 30 tablet, Rfl: 0    apixaban ANTICOAGULANT (ELIQUIS) 5 MG tablet, Take 1 tablet (5 mg) by mouth 2 times daily for 30 days, Disp: 60 tablet, Rfl: 0    aspirin 81 MG EC tablet, Take 1 tablet (81 mg) by mouth daily for 30 days, Disp: 30 tablet, Rfl: 0    clotrimazole (LOTRIMIN) 1 % external cream, Apply topically daily, Disp: , Rfl:     furosemide (LASIX) 20 MG tablet, Take 2 tablets (40 mg) by mouth 2 times daily for 30 days, Disp: 120 tablet, Rfl: 0    Metoprolol Tartrate 37.5 MG TABS, Take 37.5 mg by mouth 2 times daily for 30 days, Disp: 60 tablet, Rfl: 0    multivitamin w/minerals (THERA-VIT-M) tablet, Take 1 tablet by mouth daily, Disp: , Rfl:     nitroGLYcerin (NITROSTAT) 0.4 MG sublingual tablet, See Admin Instructions, Disp: , Rfl:     nystatin (MYCOSTATIN) 186877 UNIT/GM external cream, Apply topically 2 times daily as needed for dry skin (Patient taking differently: Apply topically daily as needed for dry skin), Disp: 30 g, Rfl: 0    pantoprazole (PROTONIX) 40 MG EC tablet, Take 1 tablet (40 mg) by mouth every morning (before breakfast) for 30 days, Disp: 30 tablet, Rfl: 0    rosuvastatin (CRESTOR) 40 MG tablet, Take 1 tablet (40 mg) by mouth at bedtime for 30 days, Disp: 30 tablet, Rfl: 0    senna-docusate (SENOKOT-S/PERICOLACE) 8.6-50 MG tablet, Take 1 tablet by mouth 2 times daily as needed for constipation, Disp: 60 tablet, Rfl: 0    guaiFENesin (MUCINEX) 600 MG 12 hr tablet, Take 1 tablet (600 mg) by mouth 2 times daily as needed for congestion (Patient not taking:  Reported on 3/12/2024), Disp: , Rfl:     ALLERGIES:      Allergies   Allergen Reactions    Bee Venom      Other Reaction(s): Edema       ROS:  Gen: No fevers, weight loss/gain  CV: Denies chest pain, peripheral edema  Pulm: Denies SOB  GI/: Voiding without problems, appetite improving.     LABS:  None    IMAGING:  None    PHYSICAL EXAM:   /64 (BP Location: Right arm, Patient Position: Sitting, Cuff Size: Adult Large)   Pulse 72   Resp 16   Wt 104.3 kg (230 lb)   SpO2 95%   BMI 33.00 kg/m    General: Alert and oriented, pleasant, no acute distress.  CV:  No peripheral edema.  Pulm: Easy work of breathing on room air.   GI: Soft, non-tender, and non-distended  Incision: Chest and leg incisions clean dry and intact without erythema, swelling or drainage  Neuro: CNs grossly intact.      ASSESSMENT/PLAN:  Nick Zaldivar is a 78 year old year old male status post CABG who returns to clinic for postop visit.     - Surgically doing well. Incisions are healing well with no signs of infection.  - Hemodynamics are stable. No medication changes were needed today.  - Follow up with your cardiology as scheduled (03/21 and 3/25)  - Continue Cardiac Rehab until completed.   - May start driving tomorrow, 03/13 (4 weeks post-op) if not using narcotic pain medications.  - Continue strict sternal precautions until 04/10/2024. No lifting >10bs; may gradually increase at this point (8 weeks post-op).   - Take 40 mg lasix by mouth THREE times a day for THREE days, then go back to 40 mg TWICE a day to help w/ leg swelling.  - No need for further follow-up with CV surgery unless concerns. Feel free to call our office with questions. [phone]       uN Chavarria PA-C  [facility] Cardiothoracic Surgery  Pager: [phone]  March 12, 2024

## 2024-03-21 ENCOUNTER — OFFICE VISIT (OUTPATIENT)
Dept: CARDIOLOGY | Facility: CLINIC | Age: 79
End: 2024-03-21
Payer: MEDICARE

## 2024-03-21 VITALS
DIASTOLIC BLOOD PRESSURE: 56 MMHG | SYSTOLIC BLOOD PRESSURE: 118 MMHG | OXYGEN SATURATION: 97 % | WEIGHT: 227 LBS | RESPIRATION RATE: 24 BRPM | BODY MASS INDEX: 32.57 KG/M2 | HEART RATE: 66 BPM

## 2024-03-21 DIAGNOSIS — Z95.1 S/P CABG (CORONARY ARTERY BYPASS GRAFT): Primary | ICD-10-CM

## 2024-03-21 PROCEDURE — 93246 EXT ECG>7D<15D RECORDING: CPT | Performed by: INTERNAL MEDICINE

## 2024-03-21 PROCEDURE — 99205 OFFICE O/P NEW HI 60 MIN: CPT | Performed by: INTERNAL MEDICINE

## 2024-03-21 PROCEDURE — G2211 COMPLEX E/M VISIT ADD ON: HCPCS | Performed by: INTERNAL MEDICINE

## 2024-03-21 RX ORDER — DOCUSATE SODIUM 100 MG/1
100 CAPSULE, LIQUID FILLED ORAL 2 TIMES DAILY
COMMUNITY
End: 2024-08-07

## 2024-03-21 NOTE — NURSING NOTE
Nick Zaldivar arrived here on 3/21/2024 1:29 PM for 8-14 Days  Zio monitor placement per ordering provider Franko for the diagnosis S/P CABG (coronary artery bypass graft) [Z95.1]   Patient s skin was prepped per protocol.  Zio monitor was placed.  Instructions were reviewed with and given to the patient.  Patient verbalized understanding of wear, troubleshooting and monitor return instructions.

## 2024-03-21 NOTE — PATIENT INSTRUCTIONS
[facility]  Cardiac Electrophysiology  [address]  [place] [address]   Office: [phone]  Fax: [phone]       Thank you for seeing us in clinic today - it is a pleasure to be a part of your care team.  Below is a summary of our plan from today's visit.      Stop Amiodarone  Zio patch for 2 weeks  Decision on Eliquis after Zio patch is returned  Recommend over the counter heart rhythm monitoring devices       Please do not hesitate to be in touch with our office at [phone] with any questions that may arise.      Thank you for trusting us with your care,    Shakeel Abdul MD  Clinical Cardiac Electrophysiology  [facility]  [address]  [place] [address]   Office: [phone]  Fax: [phone]

## 2024-03-21 NOTE — LETTER
3/21/2024    [facility]  [address] [address]    RE: Nick Carrascoer       Dear Colleague,     I had the pleasure of seeing Nick [facility] in the [facility].    HEART CARE ENCOUNTER CONSULTATON NOTE      [facility]  [phone]      Assessment/Recommendations   Assessment/Plan:    Nick Zaldivar is a very pleasant 78 year old male with PMH  moderate aortic valve stenosis, CAD with prior PCI and CABG, Heart failure with preserved EF, HTN, HLD, post op AF who presents today to the EP clinic.    Post op atrial fibrillation  - we will get a 2 week Zio patch  - Stop Amiodarone today  - If Zio patch shows no AF will discontinue Eliquis  - Recommend over the counter heart rhythm monitoring devices     2. CAD s/p CABG and PCI  - Recovering well from the procedure    3. HFpEF  - Euvolemic  - continue current meds    4. HTN  - well controlled  - continue same meds    5. Hyperlipidemia  -   - continue statin    Time spent: 60 minutes spent on the date of the encounter doing chart review, history and exam, documentation and further activities as noted above.    The longitudinal plan of care for the condition(s) below were addressed during this visit. Due to the added complexity in care, I will continue support in the subsequent management of this condition(s) and with the ongoing continuity of care of this condition(s).          History of Present Illness/Subjective    HPI: Nick Zaldivar is a very pleasant 78 year old male with PMH  moderate aortic valve stenosis, CAD with prior PCI and CABG, Heart failure with preserved EF, HTN, HLD, post op AF who presents today to the EP clinic.    Hai underwent a CABGx3 in February 2024. He was found to have post operative A-fib which resolved after amiodarone administration. He does not have any recollection of any symptoms. He has been compliant with his meds. His recovery has been good. He will  starting home rehab tomorrow      Recent Echocardiogram Results (personally reviewed):    Interpretation Summary     Left ventricular size, wall motion and function are normal. The ejection  fraction is 60-65%.  Normal right ventricle size and systolic function.  Moderate valvular aortic stenosis.  There is mild (1+) aortic regurgitation.  Ascending Aorta dilatation is present.      Labs below reviewed personally     Physical Examination  Review of Systems   Vitals: /56 (BP Location: Right arm, Patient Position: Sitting, Cuff Size: Adult Large)   Pulse 66   Resp 24   Wt 103 kg (227 lb)   SpO2 97%   BMI 32.57 kg/m    BMI= Body mass index is 32.57 kg/m .  Wt Readings from Last 3 Encounters:   03/21/24 103 kg (227 lb)   03/12/24 104.3 kg (230 lb)   03/01/24 103.2 kg (227 lb 9.6 oz)       General Appearance:   no distress, normal body habitus   ENT/Mouth: membranes moist, no oral lesions or bleeding gums.      EYES:  no scleral icterus, normal conjunctivae   Neck: no carotid bruits or thyromegaly   Chest/Lungs:   lungs are clear to auscultation, no rales or wheezing, + sternal scar, equal chest wall expansion    Cardiovascular:   Regular. Normal first and second heart sounds with Systolic murmurs, rubs, or gallops; the carotid, radial and posterior tibial pulses are intact, minimal edema bilaterally    Abdomen:  no organomegaly, masses, bruits, or tenderness; bowel sounds are present   Extremities: no cyanosis or clubbing   Skin: no xanthelasma, warm.    Neurologic: normal  bilateral, no tremors     Psychiatric: alert and oriented x3, calm        Please refer above for cardiac ROS details.        Medical History  Surgical History Family History Social History   Past Medical History:   Diagnosis Date    Aortic stenosis     Coronary artery disease     Hypertension      Past Surgical History:   Procedure Laterality Date    CORONARY ARTERY BYPASS GRAFT, WITH AORTIC VALVE REPLACEMENT, WITH ENDOSCOPIC VESSEL  PROCUREMENT N/A 2024    Procedure: CORONARY ARTERY BYPASS GRAFT TIMES THREE, LEFT INTERNAL MAMMARY ARTERY HARVEST, BILATERAL LEG ENDOSCOPIC VESSEL PROCUREMENT, EPIAORTIC ULTRASOUND;  Surgeon: Carmelina Flanagan MD;  Location: [facility] OR    CV CORONARY ANGIOGRAM N/A 2024    Procedure: Coronary Angiogram;  Surgeon: Harman Ring MD;  Location: [facility] CATH LAB CV    CV RIGHT HEART CATH MEASUREMENTS RECORDED N/A 2024    Procedure: Right Heart Catheterization;  Surgeon: Harman Ring MD;  Location: [facility] CATH LAB CV    TRANSESOPHAGEAL ECHOCARDIOGRAM INTRAOPERATIVE N/A 2024    Procedure: ANESTHESIA TRANSESOPHAGEAL ECHOCARDIOGRAM;  Surgeon: Carmelina Flanagan MD;  Location: [facility] OR     No family history on file.     Social History     Socioeconomic History    Marital status:      Spouse name: Not on file    Number of children: Not on file    Years of education: Not on file    Highest education level: Not on file   Occupational History    Not on file   Tobacco Use    Smoking status: Former     Types: Cigarettes     Quit date: 1978     Years since quittin.2    Smokeless tobacco: Never   Vaping Use    Vaping Use: Never used   Substance and Sexual Activity    Alcohol use: Not Currently    Drug use: Never    Sexual activity: Not on file   Other Topics Concern    Not on file   Social History Narrative    Not on file     Social Determinants of Health     Financial Resource Strain: Not on file   Food Insecurity: Not on file   Transportation Needs: Not on file   Physical Activity: Not on file   Stress: Not on file   Social Connections: Not on file   Interpersonal Safety: Not on file   Housing Stability: Not on file           Medications  Allergies   Current Outpatient Medications   Medication Sig Dispense Refill    acetaminophen (TYLENOL) 325 MG tablet Take 2 tablets (650 mg) by mouth every 4 hours as needed for other (For optimal non-opioid multimodal pain  management to improve pain control.)      apixaban ANTICOAGULANT (ELIQUIS) 5 MG tablet Take 1 tablet (5 mg) by mouth 2 times daily for 30 days 60 tablet 0    aspirin 81 MG EC tablet Take 1 tablet (81 mg) by mouth daily for 30 days 30 tablet 0    clotrimazole (LOTRIMIN) 1 % external cream Apply topically daily      docusate sodium (COLACE) 100 MG capsule Take 100 mg by mouth 2 times daily      furosemide (LASIX) 20 MG tablet Take 2 tablets (40 mg) by mouth 2 times daily for 30 days 120 tablet 0    Metoprolol Tartrate 37.5 MG TABS Take 37.5 mg by mouth 2 times daily for 30 days 60 tablet 0    multivitamin w/minerals (THERA-VIT-M) tablet Take 1 tablet by mouth daily      nitroGLYcerin (NITROSTAT) 0.4 MG sublingual tablet See Admin Instructions      nystatin (MYCOSTATIN) 322298 UNIT/GM external cream Apply topically 2 times daily as needed for dry skin (Patient taking differently: Apply topically daily as needed for dry skin) 30 g 0    pantoprazole (PROTONIX) 40 MG EC tablet Take 1 tablet (40 mg) by mouth every morning (before breakfast) for 30 days 30 tablet 0    rosuvastatin (CRESTOR) 40 MG tablet Take 1 tablet (40 mg) by mouth at bedtime for 30 days 30 tablet 0       Allergies   Allergen Reactions    Bee Venom      Other Reaction(s): Edema          Lab Results    Chemistry/lipid CBC Cardiac Enzymes/BNP/TSH/INR   Recent Labs   Lab Test 04/05/21  0933   CHOL 202*   HDL 29*   *   TRIG 207*     Recent Labs   Lab Test 04/05/21  0933 06/25/20  1109 07/12/18  1253   * 159* 150*     Recent Labs   Lab Test 02/26/24  0726      POTASSIUM 4.3   CHLORIDE 100   CO2 31*   GLC 92   BUN 20.9   CR 1.08   GFRESTIMATED 70   BUBBA 8.5*     Recent Labs   Lab Test 02/26/24  0726 02/20/24  0514 02/18/24  0430   CR 1.08 1.11 1.17     Recent Labs   Lab Test 02/06/24  0834   A1C 5.9*          Recent Labs   Lab Test 02/26/24  0726   WBC 9.9   HGB 9.1*   HCT 29.8*   MCV 97   *     Recent Labs   Lab Test 02/26/24  0726  02/20/24  0731 02/16/24  1139   HGB 9.1* 9.0* 8.9*    Recent Labs   Lab Test 03/26/20  2141   TROPONINI <0.01     Recent Labs   Lab Test 01/03/20  1106   BNP 33     Recent Labs   Lab Test 02/04/22  1032   TSH 0.99     Recent Labs   Lab Test 02/14/24  1259 02/06/24  0834   INR 1.43* 1.03        Shakeel Abdul MD    Thank you for allowing me to participate in the care of your patient.    Sincerely,   Shakeel Abdul MD     [facility]  cc:   Carmelina Flanagan MD  [address]  [place] [address]

## 2024-03-25 ENCOUNTER — OFFICE VISIT (OUTPATIENT)
Dept: CARDIOLOGY | Facility: CLINIC | Age: 79
End: 2024-03-25
Payer: MEDICARE

## 2024-03-25 VITALS
HEART RATE: 84 BPM | SYSTOLIC BLOOD PRESSURE: 130 MMHG | RESPIRATION RATE: 24 BRPM | BODY MASS INDEX: 32.43 KG/M2 | WEIGHT: 226 LBS | DIASTOLIC BLOOD PRESSURE: 68 MMHG

## 2024-03-25 DIAGNOSIS — I25.83 CORONARY ARTERY DISEASE DUE TO LIPID RICH PLAQUE: Primary | ICD-10-CM

## 2024-03-25 DIAGNOSIS — E66.09 CLASS 1 OBESITY DUE TO EXCESS CALORIES WITHOUT SERIOUS COMORBIDITY WITH BODY MASS INDEX (BMI) OF 32.0 TO 32.9 IN ADULT: ICD-10-CM

## 2024-03-25 DIAGNOSIS — I10 PRIMARY HYPERTENSION: ICD-10-CM

## 2024-03-25 DIAGNOSIS — I48.0 PAROXYSMAL ATRIAL FIBRILLATION (H): ICD-10-CM

## 2024-03-25 DIAGNOSIS — I25.10 CORONARY ARTERY DISEASE DUE TO LIPID RICH PLAQUE: Primary | ICD-10-CM

## 2024-03-25 DIAGNOSIS — I50.31 ACUTE DIASTOLIC CONGESTIVE HEART FAILURE (H): ICD-10-CM

## 2024-03-25 DIAGNOSIS — I06.0 RHEUMATIC AORTIC STENOSIS: ICD-10-CM

## 2024-03-25 DIAGNOSIS — E78.00 PURE HYPERCHOLESTEROLEMIA: ICD-10-CM

## 2024-03-25 DIAGNOSIS — Z95.1 S/P CABG (CORONARY ARTERY BYPASS GRAFT): ICD-10-CM

## 2024-03-25 DIAGNOSIS — E66.811 CLASS 1 OBESITY DUE TO EXCESS CALORIES WITHOUT SERIOUS COMORBIDITY WITH BODY MASS INDEX (BMI) OF 32.0 TO 32.9 IN ADULT: ICD-10-CM

## 2024-03-25 PROBLEM — Z98.890 STATUS POST CORONARY ANGIOGRAM: Status: RESOLVED | Noted: 2024-01-22 | Resolved: 2024-03-25

## 2024-03-25 LAB
ANION GAP SERPL CALCULATED.3IONS-SCNC: 11 MMOL/L (ref 7–15)
BUN SERPL-MCNC: 16 MG/DL (ref 8–23)
CALCIUM SERPL-MCNC: 9 MG/DL (ref 8.8–10.2)
CHLORIDE SERPL-SCNC: 98 MMOL/L (ref 98–107)
CREAT SERPL-MCNC: 0.95 MG/DL (ref 0.67–1.17)
DEPRECATED HCO3 PLAS-SCNC: 30 MMOL/L (ref 22–29)
EGFRCR SERPLBLD CKD-EPI 2021: 81 ML/MIN/1.73M2
GLUCOSE SERPL-MCNC: 97 MG/DL (ref 70–99)
NT-PROBNP SERPL-MCNC: 681 PG/ML (ref 0–1800)
POTASSIUM SERPL-SCNC: 4 MMOL/L (ref 3.4–5.3)
SODIUM SERPL-SCNC: 139 MMOL/L (ref 135–145)

## 2024-03-25 PROCEDURE — 99214 OFFICE O/P EST MOD 30 MIN: CPT | Performed by: INTERNAL MEDICINE

## 2024-03-25 PROCEDURE — 36415 COLL VENOUS BLD VENIPUNCTURE: CPT | Performed by: INTERNAL MEDICINE

## 2024-03-25 PROCEDURE — G2211 COMPLEX E/M VISIT ADD ON: HCPCS | Performed by: INTERNAL MEDICINE

## 2024-03-25 PROCEDURE — 80048 BASIC METABOLIC PNL TOTAL CA: CPT | Performed by: INTERNAL MEDICINE

## 2024-03-25 PROCEDURE — 83880 ASSAY OF NATRIURETIC PEPTIDE: CPT | Performed by: INTERNAL MEDICINE

## 2024-03-25 RX ORDER — PANTOPRAZOLE SODIUM 40 MG/1
90 TABLET, DELAYED RELEASE ORAL
Qty: 30 TABLET | Refills: 1 | Status: SHIPPED | OUTPATIENT
Start: 2024-03-25 | End: 2024-04-29

## 2024-03-25 RX ORDER — FUROSEMIDE 40 MG
40 TABLET ORAL 2 TIMES DAILY
Qty: 180 TABLET | Refills: 4 | Status: SHIPPED | OUTPATIENT
Start: 2024-03-25

## 2024-03-25 NOTE — PROGRESS NOTES
[facility]  Heart Care Clinic Follow-up Note    Assessment & Plan        (I25.10,  I25.83) Coronary artery disease due to lipid rich plaque  (primary encounter diagnosis)  Comment: In anticipation of TAVR he underwent diagnostic angiography showing 20 to 30% left main stenosis, proximal LAD 70% stenosis with first diagonal 90% stenosis.  Circumflex has a proximal 60% followed by 70% stenosis with first obtuse marginal artery 50 to 60% proximal and second obtuse marginal mild disease.  Right coronary artery has a proximal 60% followed by diffuse 50 to 60% mid distal as well as PDA 60% stenosis.  This prompted coronary artery bypass grafting.    (Z95.1) S/P CABG (coronary artery bypass graft)  Comment: February 14, 2024 he had a vein graft to the LAD, vein graft obtuse marginal artery, and vein graft to right coronary artery.  Postop cares.    (I50.31) Acute diastolic congestive heart failure (H)  Comment: Does have some bipedal edema and exam shows JVD.  Concerned he is up about 5 to 10 pounds of water.  Given this I will take the liberty of changing his furosemide from 40 twice daily to 80 mg for the next 3 mornings, check renal profile today and check again in 4 days.  I have asked him to fluid and salt restrict.    (I48.0) Paroxysmal atrial fibrillation (H)  Comment: This was postoperative.  Amiodarone has been discontinued.  He is on Eliquis and currently wearing event monitor and might consider stopping Eliquis.    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  This showed peak velocity of 3.2 m/s with a mean gradient of 22 mmHg and a valve area 1.3 cm .  Suspect may need to consider TAVR.      (I10) Primary hypertension  Comment: Under good control currently.    (E78.00) Pure  hypercholesterolemia  Comment: Total cholesterol 112 an LDL of 52 on rosuvastatin which is excellent.    (E66.09,  Z68.32) Class 1 obesity due to excess calories without serious comorbidity with body mass index (BMI) of 32.0 to 32.9 in adult  Comment: Continue to work on weight loss.    (H81.13) Benign paroxysmal positional vertigo due to bilateral vestibular disorder  Comment: Sounds orthostatic, will check echo, if there is not severe aortic stenosis we will then get event monitor.    Plan  1.  Work on weight loss.  2.  Check renal profile and BNP today.  3.  Change furosemide from 40 mg twice daily to 80 mg for the next 3 mornings.  Check renal profile in 5 days.  4.  Consider ENT clinic or balance center to look at his vertigo.  5.  Fluid and salt restrict.  6.  Follow-up with me in about 3 months or sooner if needed.    Subjective  CC: 78-year-old white gentleman being seen post hospital discharge follow-up.  He was discharged from TCU following open heart surgery on March 1 and living at home independently with his daughter and wife.  He is getting PT and OT.  Some days he is short winded with bipedal edema.  Other days his breathing is better than it was before surgery.  He has occasional sharp stabbing sensations in his sternal wound.  There is no syncope, presyncope, PND, orthopnea.  He does have benign positional vertigo when he lies down he gets vertiginous.    Medications  Current Outpatient Medications   Medication Sig Dispense Refill    acetaminophen (TYLENOL) 325 MG tablet Take 2 tablets (650 mg) by mouth every 4 hours as needed for other (For optimal non-opioid multimodal pain management to improve pain control.)      apixaban ANTICOAGULANT (ELIQUIS) 5 MG tablet Take 1 tablet (5 mg) by mouth 2 times daily 60 tablet 0    aspirin 81 MG EC tablet Take 1 tablet (81 mg) by mouth daily for 30 days 30 tablet 0    clotrimazole (LOTRIMIN) 1 % external cream Apply topically daily      docusate sodium (COLACE)  100 MG capsule Take 100 mg by mouth 2 times daily      furosemide (LASIX) 40 MG tablet Take 1 tablet (40 mg) by mouth 2 times daily 180 tablet 4    Metoprolol Tartrate 37.5 MG TABS Take 37.5 mg by mouth 2 times daily for 30 days 60 tablet 0    multivitamin w/minerals (THERA-VIT-M) tablet Take 1 tablet by mouth daily      nitroGLYcerin (NITROSTAT) 0.4 MG sublingual tablet See Admin Instructions      nystatin (MYCOSTATIN) 586541 UNIT/GM external cream Apply topically 2 times daily as needed for dry skin (Patient taking differently: Apply topically daily as needed for dry skin) 30 g 0    pantoprazole (PROTONIX) 40 MG EC tablet Take 2 tablets (80 mg) by mouth every morning (before breakfast) 30 tablet 1    rosuvastatin (CRESTOR) 40 MG tablet Take 1 tablet (40 mg) by mouth at bedtime for 30 days 30 tablet 0       Objective  /68 (BP Location: Right arm, Patient Position: Sitting, Cuff Size: Adult Large)   Pulse 84   Resp 24   Wt 102.5 kg (226 lb)   BMI 32.43 kg/m      General Appearance:    Alert, cooperative, no distress, appears stated age   Head:    Normocephalic, without obvious abnormality, atraumatic   Throat:   Lips, mucosa, and tongue normal; teeth and gums normal   Neck:   Supple, symmetrical, trachea midline, no adenopathy;        thyroid:  No enlargement/tenderness/nodules; no carotid    bruit, to jaw at 30 degrees JVD   Back:     Symmetric, no curvature, ROM normal, no CVA tenderness   Lungs:     Clear to auscultation bilaterally, respirations unlabored   Chest wall:    No tenderness, midline sternotomy scar   Heart:    Regular rate and rhythm, S1 and S2 normal, no murmur, rub   or gallop   Abdomen:     Soft, non-tender, distended, bowel sounds active all four quadrants,     no masses, no organomegaly   Extremities:   Normal, atraumatic, no cyanosis, 2/4 bipedal pitting edema   Pulses:   2+ and symmetric all extremities   Skin:   Skin color, texture, turgor normal, no rashes or lesions  "    Results    Lab Results personally reviewed   Lab Results   Component Value Date    CHOL 202 (H) 04/05/2021    CHOL 251 (H) 06/25/2020     Lab Results   Component Value Date    HDL 29 (L) 04/05/2021    HDL 33 (L) 06/25/2020     No components found for: \"LDLCALC\"  Lab Results   Component Value Date    TRIG 207 (H) 04/05/2021    TRIG 293 (H) 06/25/2020     Lab Results   Component Value Date    WBC 9.9 02/26/2024    HGB 9.1 (L) 02/26/2024    HCT 29.8 (L) 02/26/2024     (H) 02/26/2024     Lab Results   Component Value Date    BUN 20.9 02/26/2024     02/26/2024    CO2 31 (H) 02/26/2024         "

## 2024-03-25 NOTE — LETTER
3/25/2024    [facility]  [address] [address]    RE: Nick Zaldivar       Dear Colleague,     I had the pleasure of seeing Nick Zaldivar in the [facility].      [facility]  Heart Care Clinic Follow-up Note    Assessment & Plan        (I25.10,  I25.83) Coronary artery disease due to lipid rich plaque  (primary encounter diagnosis)  Comment: In anticipation of TAVR he underwent diagnostic angiography showing 20 to 30% left main stenosis, proximal LAD 70% stenosis with first diagonal 90% stenosis.  Circumflex has a proximal 60% followed by 70% stenosis with first obtuse marginal artery 50 to 60% proximal and second obtuse marginal mild disease.  Right coronary artery has a proximal 60% followed by diffuse 50 to 60% mid distal as well as PDA 60% stenosis.  This prompted coronary artery bypass grafting.    (Z95.1) S/P CABG (coronary artery bypass graft)  Comment: February 14, 2024 he had a vein graft to the LAD, vein graft obtuse marginal artery, and vein graft to right coronary artery.  Postop cares.    (I50.31) Acute diastolic congestive heart failure (H)  Comment: Does have some bipedal edema and exam shows JVD.  Concerned he is up about 5 to 10 pounds of water.  Given this I will take the liberty of changing his furosemide from 40 twice daily to 80 mg for the next 3 mornings, check renal profile today and check again in 4 days.  I have asked him to fluid and salt restrict.    (I48.0) Paroxysmal atrial fibrillation (H)  Comment: This was postoperative.  Amiodarone has been discontinued.  He is on Eliquis and currently wearing event monitor and might consider stopping Eliquis.    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  This showed  peak velocity of 3.2 m/s with a mean gradient of 22 mmHg and a valve area 1.3 cm .  Suspect may need to consider TAVR.      (I10) Primary hypertension  Comment: Under good control currently.    (E78.00) Pure hypercholesterolemia  Comment: Total cholesterol 112 an LDL of 52 on rosuvastatin which is excellent.    (E66.09,  Z68.32) Class 1 obesity due to excess calories without serious comorbidity with body mass index (BMI) of 32.0 to 32.9 in adult  Comment: Continue to work on weight loss.    (H81.13) Benign paroxysmal positional vertigo due to bilateral vestibular disorder  Comment: Sounds orthostatic, will check echo, if there is not severe aortic stenosis we will then get event monitor.    Plan  1.  Work on weight loss.  2.  Check renal profile and BNP today.  3.  Change furosemide from 40 mg twice daily to 80 mg for the next 3 mornings.  Check renal profile in 5 days.  4.  Consider ENT clinic or balance center to look at his vertigo.  5.  Fluid and salt restrict.  6.  Follow-up with me in about 3 months or sooner if needed.    Subjective  CC: 78-year-old white gentleman being seen post hospital discharge follow-up.  He was discharged from TCU following open heart surgery on March 1 and living at home independently with his daughter and wife.  He is getting PT and OT.  Some days he is short winded with bipedal edema.  Other days his breathing is better than it was before surgery.  He has occasional sharp stabbing sensations in his sternal wound.  There is no syncope, presyncope, PND, orthopnea.  He does have benign positional vertigo when he lies down he gets vertiginous.    Medications  Current Outpatient Medications   Medication Sig Dispense Refill    acetaminophen (TYLENOL) 325 MG tablet Take 2 tablets (650 mg) by mouth every 4 hours as needed for other (For optimal non-opioid multimodal pain management to improve pain control.)      apixaban ANTICOAGULANT (ELIQUIS) 5 MG tablet Take 1 tablet (5 mg) by mouth 2  times daily 60 tablet 0    aspirin 81 MG EC tablet Take 1 tablet (81 mg) by mouth daily for 30 days 30 tablet 0    clotrimazole (LOTRIMIN) 1 % external cream Apply topically daily      docusate sodium (COLACE) 100 MG capsule Take 100 mg by mouth 2 times daily      furosemide (LASIX) 40 MG tablet Take 1 tablet (40 mg) by mouth 2 times daily 180 tablet 4    Metoprolol Tartrate 37.5 MG TABS Take 37.5 mg by mouth 2 times daily for 30 days 60 tablet 0    multivitamin w/minerals (THERA-VIT-M) tablet Take 1 tablet by mouth daily      nitroGLYcerin (NITROSTAT) 0.4 MG sublingual tablet See Admin Instructions      nystatin (MYCOSTATIN) 916703 UNIT/GM external cream Apply topically 2 times daily as needed for dry skin (Patient taking differently: Apply topically daily as needed for dry skin) 30 g 0    pantoprazole (PROTONIX) 40 MG EC tablet Take 2 tablets (80 mg) by mouth every morning (before breakfast) 30 tablet 1    rosuvastatin (CRESTOR) 40 MG tablet Take 1 tablet (40 mg) by mouth at bedtime for 30 days 30 tablet 0       Objective  /68 (BP Location: Right arm, Patient Position: Sitting, Cuff Size: Adult Large)   Pulse 84   Resp 24   Wt 102.5 kg (226 lb)   BMI 32.43 kg/m      General Appearance:    Alert, cooperative, no distress, appears stated age   Head:    Normocephalic, without obvious abnormality, atraumatic   Throat:   Lips, mucosa, and tongue normal; teeth and gums normal   Neck:   Supple, symmetrical, trachea midline, no adenopathy;        thyroid:  No enlargement/tenderness/nodules; no carotid    bruit, to jaw at 30 degrees JVD   Back:     Symmetric, no curvature, ROM normal, no CVA tenderness   Lungs:     Clear to auscultation bilaterally, respirations unlabored   Chest wall:    No tenderness, midline sternotomy scar   Heart:    Regular rate and rhythm, S1 and S2 normal, no murmur, rub   or gallop   Abdomen:     Soft, non-tender, distended, bowel sounds active all four quadrants,     no masses, no  "organomegaly   Extremities:   Normal, atraumatic, no cyanosis, 2/4 bipedal pitting edema   Pulses:   2+ and symmetric all extremities   Skin:   Skin color, texture, turgor normal, no rashes or lesions     Results    Lab Results personally reviewed   Lab Results   Component Value Date    CHOL 202 (H) 04/05/2021    CHOL 251 (H) 06/25/2020     Lab Results   Component Value Date    HDL 29 (L) 04/05/2021    HDL 33 (L) 06/25/2020     No components found for: \"LDLCALC\"  Lab Results   Component Value Date    TRIG 207 (H) 04/05/2021    TRIG 293 (H) 06/25/2020     Lab Results   Component Value Date    WBC 9.9 02/26/2024    HGB 9.1 (L) 02/26/2024    HCT 29.8 (L) 02/26/2024     (H) 02/26/2024     Lab Results   Component Value Date    BUN 20.9 02/26/2024     02/26/2024    CO2 31 (H) 02/26/2024             Thank you for allowing me to participate in the care of your patient.      Sincerely,     VEROINKA HOPE MD     [facility]  cc:   Veronika Hope MD  [address], [address]  [place] [address]      "

## 2024-03-25 NOTE — PATIENT INSTRUCTIONS
Mr Nick Zaldivar,  I enjoyed visiting with you and your daughter again today.  I am glad to hear you are doing well.  I am sorry that we had to do l surgery but I think the best thing for you.  Per our conversation I will check the blood work today, this will dictate how much potassium if you take.  I would like you to double your furosemide in the morning, only take two 40 mg or 80 mg Tuesday, Wednesday, and Thursday, limit fluid limit salt, we will get blood test on Friday and my goal is to get you to lose close to 5 to 10 pounds.  I will plan on seeing you 3 months or sooner if needed.  Jas Hope

## 2024-03-29 ENCOUNTER — LAB (OUTPATIENT)
Dept: LAB | Facility: CLINIC | Age: 79
End: 2024-03-29
Payer: MEDICARE

## 2024-03-29 DIAGNOSIS — E87.79 OTHER HYPERVOLEMIA: ICD-10-CM

## 2024-03-29 DIAGNOSIS — I06.0 RHEUMATIC AORTIC STENOSIS: ICD-10-CM

## 2024-03-29 LAB
ANION GAP SERPL CALCULATED.3IONS-SCNC: 9 MMOL/L (ref 7–15)
BUN SERPL-MCNC: 20.2 MG/DL (ref 8–23)
CALCIUM SERPL-MCNC: 9.5 MG/DL (ref 8.8–10.2)
CHLORIDE SERPL-SCNC: 102 MMOL/L (ref 98–107)
CREAT SERPL-MCNC: 1.08 MG/DL (ref 0.67–1.17)
DEPRECATED HCO3 PLAS-SCNC: 31 MMOL/L (ref 22–29)
EGFRCR SERPLBLD CKD-EPI 2021: 70 ML/MIN/1.73M2
GLUCOSE SERPL-MCNC: 116 MG/DL (ref 70–99)
POTASSIUM SERPL-SCNC: 4.3 MMOL/L (ref 3.4–5.3)
SODIUM SERPL-SCNC: 142 MMOL/L (ref 135–145)

## 2024-03-29 PROCEDURE — 36415 COLL VENOUS BLD VENIPUNCTURE: CPT

## 2024-03-29 PROCEDURE — 80048 BASIC METABOLIC PNL TOTAL CA: CPT

## 2024-04-08 PROCEDURE — 93248 EXT ECG>7D<15D REV&INTERPJ: CPT | Performed by: INTERNAL MEDICINE

## 2024-04-19 ENCOUNTER — HOSPITAL ENCOUNTER (OUTPATIENT)
Dept: CARDIAC REHAB | Facility: CLINIC | Age: 79
Discharge: HOME | End: 2024-04-19
Attending: THORACIC SURGERY (CARDIOTHORACIC VASCULAR SURGERY)
Payer: MEDICARE

## 2024-04-19 DIAGNOSIS — Z95.1 S/P CABG (CORONARY ARTERY BYPASS GRAFT): ICD-10-CM

## 2024-04-19 PROCEDURE — 93797 PHYS/QHP OP CAR RHAB WO ECG: CPT

## 2024-04-19 PROCEDURE — 93798 PHYS/QHP OP CAR RHAB W/ECG: CPT

## 2024-04-20 ENCOUNTER — MYC MEDICAL ADVICE (OUTPATIENT)
Dept: CARDIOLOGY | Facility: CLINIC | Age: 79
End: 2024-04-20
Payer: MEDICARE

## 2024-04-22 NOTE — TELEPHONE ENCOUNTER
Type Date User Summary Attachment   Insurance 02/23/2024  2:43 PM Lenora Rodriguez ICT (CR) -   Note:  Primary: MEDICARE  Secondary: AARP  Tertiary:      Discipline: CR     Prior Authorization Requirements, Limits or Special Info     X  Limitations/Not Covered/Additional Info:      Medicare: Limit 36 visit per episode of care  31 sessions have been used in previous episodes of care  Is KX modifier needed: After 5 sessions     CAUTION: This is not a guarantee of coverage. Patient's insurance company will determine coverage after services are rendered and the claim has been processed by the insurance company. Additionally, [employer] does not guarantee coverage of services provided.      .  Type Date User Summary Attachment   Insurance 02/23/2024  2:42 PM Lenora Rodriguez Outpatient Medicare Additional Info: -   Note:  Outpatient Medicare Additional Info:  HMO? no, if Yes, ID: , Opt: , Eff Dt:   MSP/other insurance? no, if Yes, other insurance name:      Medicare: Limit 36 visit per episode of care  31 sessions have been used in previous episodes of care  Is KX modifier needed: After 5 sessions     .  Type Date User Summary Attachment   Provider Comments 02/15/2024 11:21 AM Elmer Paula EP Provider Comments -   Note:  If you have not heard from the scheduling office within 2 business days, please call [phone] for all locations, with the exception of [place], please call [phone] and [place], please call [phone].     Please be aware that coverage of these services is subject to the terms and limitations of your health insurance plan.  Call member services at your health plan with any benefit or coverage questions    ----------------------------------------------------------------------------------------------------------------------  Responded to patient. Pt looks to be covered but encouraged him to call his insurance and check coverage benefits. Cost of care estimates line also provided. -[facility]

## 2024-04-23 ENCOUNTER — TELEPHONE (OUTPATIENT)
Dept: CARDIOLOGY | Facility: CLINIC | Age: 79
End: 2024-04-23
Payer: MEDICARE

## 2024-04-23 NOTE — TELEPHONE ENCOUNTER
Received a fax from [facility] requesting Dr. Hope's signature on order form that says to discontinued Amiodarone. Dr. Hope did not discontinue Amiodarone, MARY BOSS did- Dr. Abdul. Faxed this back to [facility] and included e-signed discontinuation of medication + chart note.  The Rx was discontinued and this discontinuation was received by the pharmacy based on information in Epic. If further signature is needed despite electronic records, will have to send to EP for signature.  -[facility]

## 2024-04-28 DIAGNOSIS — Z95.1 S/P CABG (CORONARY ARTERY BYPASS GRAFT): ICD-10-CM

## 2024-04-29 RX ORDER — PANTOPRAZOLE SODIUM 40 MG/1
90 TABLET, DELAYED RELEASE ORAL
Qty: 60 TABLET | OUTPATIENT
Start: 2024-04-29

## 2024-04-29 RX ORDER — PANTOPRAZOLE SODIUM 40 MG/1
40 TABLET, DELAYED RELEASE ORAL
COMMUNITY

## 2024-04-29 NOTE — TELEPHONE ENCOUNTER
Last seen 3/25/24 - rec follow-up 3 months.  Dr Hope changed dose of pantoprazole prescribed by PCP.  Will review with provider.  In same office visit it is documented furosemide increase 40 mg BID to 80 mg every morning which are the same instructions he put on pantoprazole.    Med refill refused at this time, until discussed with provider.  -ral      Per PCP:

## 2024-04-29 NOTE — TELEPHONE ENCOUNTER
Rx updated to patient reported, 40 mg daily.  Pt updated, verbalized understanding and had no questions.  -ral    ----- Message -----  From: Jas Hope MD  Sent: 4/29/2024   3:55 PM CDT  To: Cyndi Eng RN    Yes, you are correct .  I would rather not refill.  The furosemide is only for 3 days.  I would ask you connect with the patient and see if he get his insulins refilled by his primary.  LF

## 2024-04-30 ENCOUNTER — HOSPITAL ENCOUNTER (OUTPATIENT)
Dept: CARDIAC REHAB | Facility: CLINIC | Age: 79
Discharge: HOME | End: 2024-04-30
Attending: THORACIC SURGERY (CARDIOTHORACIC VASCULAR SURGERY)
Payer: MEDICARE

## 2024-04-30 PROCEDURE — 93798 PHYS/QHP OP CAR RHAB W/ECG: CPT

## 2024-05-01 ENCOUNTER — TELEPHONE (OUTPATIENT)
Dept: CARDIOLOGY | Facility: CLINIC | Age: 79
End: 2024-05-01
Payer: MEDICARE

## 2024-05-01 NOTE — TELEPHONE ENCOUNTER
Patient scheduled for echo prior to Dr. Herminia spangler. Will hold on scheduling with our team and see what results of echo show.     Dionne Greene RN on 5/1/2024 at 4:14 PM

## 2024-05-01 NOTE — TELEPHONE ENCOUNTER
Patient saw our team in January, moderate aortic stenosis. Had cor angio showing 3 vessel disease and underwent CABG x 3 in February. Patient saw Dr. Hope in March who recommended follow up in 3 months. Patient is due to have repeat echo in June and see our team. Requested program/ reach out to discuss and ensure patient has echo.     Dionne Greene RN on 5/1/2024 at 9:11 AM

## 2024-05-02 ENCOUNTER — HOSPITAL ENCOUNTER (OUTPATIENT)
Dept: CARDIAC REHAB | Facility: CLINIC | Age: 79
Discharge: HOME | End: 2024-05-02
Attending: THORACIC SURGERY (CARDIOTHORACIC VASCULAR SURGERY)
Payer: MEDICARE

## 2024-05-02 PROCEDURE — 93798 PHYS/QHP OP CAR RHAB W/ECG: CPT

## 2024-05-07 ENCOUNTER — HOSPITAL ENCOUNTER (OUTPATIENT)
Dept: CARDIAC REHAB | Facility: CLINIC | Age: 79
Discharge: HOME | End: 2024-05-07
Attending: THORACIC SURGERY (CARDIOTHORACIC VASCULAR SURGERY)
Payer: MEDICARE

## 2024-05-07 PROCEDURE — 93798 PHYS/QHP OP CAR RHAB W/ECG: CPT | Mod: KX

## 2024-05-08 ENCOUNTER — TELEPHONE (OUTPATIENT)
Dept: CARDIOLOGY | Facility: CLINIC | Age: 79
End: 2024-05-08
Payer: MEDICARE

## 2024-05-08 NOTE — TELEPHONE ENCOUNTER
Received a handout from patient in clinic wanting to clarify his furosemide dosage. Writer confirmed that he should be on 40 mg bid with his daughter. Understanding verbalized. -aislinn

## 2024-05-09 ENCOUNTER — HOSPITAL ENCOUNTER (OUTPATIENT)
Dept: CARDIAC REHAB | Facility: CLINIC | Age: 79
Discharge: HOME | End: 2024-05-09
Attending: THORACIC SURGERY (CARDIOTHORACIC VASCULAR SURGERY)
Payer: MEDICARE

## 2024-05-09 PROCEDURE — 93798 PHYS/QHP OP CAR RHAB W/ECG: CPT | Mod: KX

## 2024-05-14 ENCOUNTER — HOSPITAL ENCOUNTER (OUTPATIENT)
Dept: CARDIAC REHAB | Facility: CLINIC | Age: 79
Discharge: HOME | End: 2024-05-14
Attending: THORACIC SURGERY (CARDIOTHORACIC VASCULAR SURGERY)
Payer: MEDICARE

## 2024-05-14 PROCEDURE — 93798 PHYS/QHP OP CAR RHAB W/ECG: CPT | Mod: KX

## 2024-05-16 ENCOUNTER — HOSPITAL ENCOUNTER (OUTPATIENT)
Dept: CARDIAC REHAB | Facility: CLINIC | Age: 79
Discharge: HOME | End: 2024-05-16
Attending: THORACIC SURGERY (CARDIOTHORACIC VASCULAR SURGERY)
Payer: MEDICARE

## 2024-05-16 PROCEDURE — 93798 PHYS/QHP OP CAR RHAB W/ECG: CPT | Mod: KX

## 2024-05-21 ENCOUNTER — HOSPITAL ENCOUNTER (OUTPATIENT)
Dept: CARDIAC REHAB | Facility: CLINIC | Age: 79
Discharge: HOME | End: 2024-05-21
Attending: THORACIC SURGERY (CARDIOTHORACIC VASCULAR SURGERY)
Payer: MEDICARE

## 2024-05-21 PROCEDURE — 93798 PHYS/QHP OP CAR RHAB W/ECG: CPT | Mod: KX

## 2024-05-21 PROCEDURE — 93797 PHYS/QHP OP CAR RHAB WO ECG: CPT | Mod: 59

## 2024-05-22 ENCOUNTER — TRANSFERRED RECORDS (OUTPATIENT)
Dept: CARDIOLOGY | Facility: CLINIC | Age: 79
End: 2024-05-22
Payer: MEDICARE

## 2024-05-23 ENCOUNTER — HOSPITAL ENCOUNTER (OUTPATIENT)
Dept: CARDIAC REHAB | Facility: CLINIC | Age: 79
Discharge: HOME | End: 2024-05-23
Attending: THORACIC SURGERY (CARDIOTHORACIC VASCULAR SURGERY)
Payer: MEDICARE

## 2024-05-23 PROCEDURE — 93798 PHYS/QHP OP CAR RHAB W/ECG: CPT | Mod: KX

## 2024-05-28 ENCOUNTER — HOSPITAL ENCOUNTER (OUTPATIENT)
Dept: CARDIAC REHAB | Facility: CLINIC | Age: 79
Discharge: HOME | End: 2024-05-28
Attending: THORACIC SURGERY (CARDIOTHORACIC VASCULAR SURGERY)
Payer: MEDICARE

## 2024-05-28 PROCEDURE — 93798 PHYS/QHP OP CAR RHAB W/ECG: CPT | Mod: KX

## 2024-05-30 ENCOUNTER — HOSPITAL ENCOUNTER (OUTPATIENT)
Dept: CARDIAC REHAB | Facility: CLINIC | Age: 79
Discharge: HOME | End: 2024-05-30
Attending: THORACIC SURGERY (CARDIOTHORACIC VASCULAR SURGERY)
Payer: MEDICARE

## 2024-05-30 PROCEDURE — 93798 PHYS/QHP OP CAR RHAB W/ECG: CPT | Mod: KX

## 2024-06-04 ENCOUNTER — HOSPITAL ENCOUNTER (OUTPATIENT)
Dept: CARDIAC REHAB | Facility: CLINIC | Age: 79
Discharge: HOME | End: 2024-06-04
Attending: THORACIC SURGERY (CARDIOTHORACIC VASCULAR SURGERY)
Payer: MEDICARE

## 2024-06-04 PROCEDURE — 93798 PHYS/QHP OP CAR RHAB W/ECG: CPT | Mod: KX

## 2024-06-06 ENCOUNTER — HOSPITAL ENCOUNTER (OUTPATIENT)
Dept: CARDIAC REHAB | Facility: CLINIC | Age: 79
Discharge: HOME | End: 2024-06-06
Attending: THORACIC SURGERY (CARDIOTHORACIC VASCULAR SURGERY)
Payer: MEDICARE

## 2024-06-06 PROCEDURE — 93798 PHYS/QHP OP CAR RHAB W/ECG: CPT | Mod: KX

## 2024-06-11 ENCOUNTER — HOSPITAL ENCOUNTER (OUTPATIENT)
Dept: CARDIAC REHAB | Facility: CLINIC | Age: 79
Discharge: HOME | End: 2024-06-11
Attending: THORACIC SURGERY (CARDIOTHORACIC VASCULAR SURGERY)
Payer: MEDICARE

## 2024-06-11 PROCEDURE — 93798 PHYS/QHP OP CAR RHAB W/ECG: CPT | Mod: KX

## 2024-06-12 ENCOUNTER — TELEPHONE (OUTPATIENT)
Dept: CARDIOLOGY | Facility: CLINIC | Age: 79
End: 2024-06-12
Payer: MEDICARE

## 2024-06-12 NOTE — TELEPHONE ENCOUNTER
Noted. Chart reviewed and plan per valve clinic note dated 5/1/24 outlines that structural team is following the patient and awaiting his echocardiogram before determining next steps.    Called Larissa and updated her that echo next week will determine follow up and next steps. Understanding verbalized. Echo scheduled 6/18. -[facility]

## 2024-06-12 NOTE — TELEPHONE ENCOUNTER
[facility] Call Center    Phone Message    May a detailed message be left on voicemail: yes     Reason for Call: Other: please call pts daughter back to discuss getting the valve replacement portion of procedure done 2/14/24 scheduled as that part was unable to be completed back in Feb. Per pts daughter      Action Taken: Other: cardiology     Travel Screening: Not Applicable         Thank you!  [facility]

## 2024-06-13 ENCOUNTER — HOSPITAL ENCOUNTER (OUTPATIENT)
Dept: CARDIAC REHAB | Facility: CLINIC | Age: 79
Discharge: HOME | End: 2024-06-13
Attending: THORACIC SURGERY (CARDIOTHORACIC VASCULAR SURGERY)
Payer: MEDICARE

## 2024-06-13 PROCEDURE — 93798 PHYS/QHP OP CAR RHAB W/ECG: CPT | Mod: KX

## 2024-06-18 ENCOUNTER — HOSPITAL ENCOUNTER (OUTPATIENT)
Dept: CARDIOLOGY | Facility: HOSPITAL | Age: 79
Discharge: HOME | End: 2024-06-18
Attending: INTERNAL MEDICINE | Admitting: INTERNAL MEDICINE
Payer: MEDICARE

## 2024-06-18 ENCOUNTER — HOSPITAL ENCOUNTER (OUTPATIENT)
Dept: CARDIAC REHAB | Facility: CLINIC | Age: 79
Discharge: HOME | End: 2024-06-18
Attending: THORACIC SURGERY (CARDIOTHORACIC VASCULAR SURGERY)
Payer: MEDICARE

## 2024-06-18 DIAGNOSIS — I06.0 RHEUMATIC AORTIC STENOSIS: ICD-10-CM

## 2024-06-18 LAB — LVEF ECHO: NORMAL

## 2024-06-18 PROCEDURE — 93306 TTE W/DOPPLER COMPLETE: CPT | Mod: 26 | Performed by: INTERNAL MEDICINE

## 2024-06-18 PROCEDURE — 255N000002 HC RX 255 OP 636: Performed by: INTERNAL MEDICINE

## 2024-06-18 PROCEDURE — 93798 PHYS/QHP OP CAR RHAB W/ECG: CPT | Mod: KX

## 2024-06-18 RX ADMIN — PERFLUTREN 3 ML: 6.52 INJECTION, SUSPENSION INTRAVENOUS at 14:28

## 2024-06-20 ENCOUNTER — HOSPITAL ENCOUNTER (OUTPATIENT)
Dept: CARDIAC REHAB | Facility: CLINIC | Age: 79
Discharge: HOME | End: 2024-06-20
Attending: THORACIC SURGERY (CARDIOTHORACIC VASCULAR SURGERY)
Payer: MEDICARE

## 2024-06-20 PROCEDURE — 93798 PHYS/QHP OP CAR RHAB W/ECG: CPT | Mod: KX

## 2024-07-21 ENCOUNTER — HEALTH MAINTENANCE LETTER (OUTPATIENT)
Age: 79
End: 2024-07-21

## 2024-08-07 ENCOUNTER — OFFICE VISIT (OUTPATIENT)
Dept: CARDIOLOGY | Facility: CLINIC | Age: 79
End: 2024-08-07
Attending: INTERNAL MEDICINE
Payer: MEDICARE

## 2024-08-07 VITALS
DIASTOLIC BLOOD PRESSURE: 60 MMHG | SYSTOLIC BLOOD PRESSURE: 128 MMHG | HEART RATE: 72 BPM | BODY MASS INDEX: 32.14 KG/M2 | WEIGHT: 224 LBS | OXYGEN SATURATION: 93 %

## 2024-08-07 DIAGNOSIS — E66.09 CLASS 1 OBESITY DUE TO EXCESS CALORIES WITHOUT SERIOUS COMORBIDITY WITH BODY MASS INDEX (BMI) OF 32.0 TO 32.9 IN ADULT: ICD-10-CM

## 2024-08-07 DIAGNOSIS — E78.00 PURE HYPERCHOLESTEROLEMIA: ICD-10-CM

## 2024-08-07 DIAGNOSIS — E66.811 CLASS 1 OBESITY DUE TO EXCESS CALORIES WITHOUT SERIOUS COMORBIDITY WITH BODY MASS INDEX (BMI) OF 32.0 TO 32.9 IN ADULT: ICD-10-CM

## 2024-08-07 DIAGNOSIS — Z95.1 S/P CABG (CORONARY ARTERY BYPASS GRAFT): ICD-10-CM

## 2024-08-07 DIAGNOSIS — I10 PRIMARY HYPERTENSION: ICD-10-CM

## 2024-08-07 DIAGNOSIS — I48.0 PAROXYSMAL ATRIAL FIBRILLATION (H): ICD-10-CM

## 2024-08-07 DIAGNOSIS — I06.0 RHEUMATIC AORTIC STENOSIS: ICD-10-CM

## 2024-08-07 DIAGNOSIS — I25.10 CORONARY ARTERY DISEASE DUE TO LIPID RICH PLAQUE: Primary | ICD-10-CM

## 2024-08-07 DIAGNOSIS — I25.83 CORONARY ARTERY DISEASE DUE TO LIPID RICH PLAQUE: Primary | ICD-10-CM

## 2024-08-07 PROCEDURE — G2211 COMPLEX E/M VISIT ADD ON: HCPCS | Performed by: INTERNAL MEDICINE

## 2024-08-07 PROCEDURE — 99214 OFFICE O/P EST MOD 30 MIN: CPT | Performed by: INTERNAL MEDICINE

## 2024-08-07 RX ORDER — ASPIRIN 81 MG/1
81 TABLET ORAL DAILY
COMMUNITY

## 2024-08-07 NOTE — LETTER
8/7/2024    [facility]  [address] [address]    RE: Nick Zaldivar       Dear Colleague,     I had the pleasure of seeing Nick Zaldivar in the [facility].      [facility]  Heart Care Clinic Follow-up Note    Assessment & Plan          (I25.10,  I25.83) Coronary artery disease due to lipid rich plaque  (primary encounter diagnosis)  Comment: In anticipation of TAVR he underwent diagnostic angiography showing 20 to 30% left main stenosis, proximal LAD 70% stenosis with first diagonal 90% stenosis.  Circumflex has a proximal 60% followed by 70% stenosis with first obtuse marginal artery 50 to 60% proximal and second obtuse marginal mild disease.  Right coronary artery has a proximal 60% followed by diffuse 50 to 60% mid distal as well as PDA 60% stenosis.  This prompted coronary artery bypass grafting.     (Z95.1) S/P CABG (coronary artery bypass graft)  Comment: February 14, 2024 he had a vein graft to the LAD, vein graft obtuse marginal artery, and vein graft to right coronary artery without aortic valve replacement.  Getting along well.    (I50.31) Acute diastolic congestive heart failure (H)  Comment: Does have some bipedal edema and exam shows JVD.  Concerned he is up about 5 to 10 pounds of water.  Given this took furosemide 40 mg twice daily, doing well, no significant signs or symptoms of heart failure, decrease furosemide to 1 a day, only 40 mg a morning.    (I48.0) Paroxysmal atrial fibrillation (H)  Comment: This was postoperative.  Amiodarone has been discontinued.  He is on Eliquis and currently off Eliquis due to no recurrence of atrial fibrillation.    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      (I10) Primary hypertension  Comment: Under good control currently.     (E78.00) Pure hypercholesterolemia  Comment: Total cholesterol 112 an LDL of 52 on rosuvastatin which is excellent.     (E66.09,  Z68.32) Class 1 obesity due to excess calories without serious comorbidity with body mass index (BMI) of 32.0 to 32.9 in adult  Comment: Continue to work on weight loss.    Plan  1.  Decrease furosemide to only once a day.  2.  Recheck echo in December.  This will be 6 months out from prior echo.  3.  Follow-up with me a year out from surgery which will be February 2025.  4.  If he should have significant heart failure or angina or syncope he knows to come to medical attention.    The longitudinal plan of care for the diagnosis(es)/condition(s) as documented were addressed during this visit. Due to the added complexity in care, I will continue to support Hai in the subsequent management and with ongoing continuity of care.     Subjective  CC: 78-year-old white gentleman here for a follow-up visit, he is living independently with his wife, he is here today with a granddaughter who is now pregnant with his first great grandson.  He is living independently physically active, getting along well without any significant chest pains, palpitations, shortness of breath, PND, therapy, syncope, dizziness or peripheral edema.    Medications  Current Outpatient Medications   Medication Sig Dispense Refill     acetaminophen (TYLENOL) 325 MG tablet Take 2 tablets (650 mg) by mouth every 4 hours as needed for other (For optimal non-opioid multimodal pain management to improve pain control.)       aspirin 81 MG EC tablet Take 81 mg by mouth daily       furosemide (LASIX) 40 MG tablet Take 1 tablet (40 mg) by mouth 2 times  "daily 180 tablet 4     Metoprolol Tartrate 37.5 MG TABS Take 37.5 mg by mouth 2 times daily for 30 days 60 tablet 0     multivitamin w/minerals (THERA-VIT-M) tablet Take 1 tablet by mouth daily       nitroGLYcerin (NITROSTAT) 0.4 MG sublingual tablet See Admin Instructions       pantoprazole (PROTONIX) 40 MG EC tablet Take 40 mg by mouth daily before breakfast       rosuvastatin (CRESTOR) 40 MG tablet Take 1 tablet (40 mg) by mouth at bedtime for 30 days 30 tablet 0       Objective  /60 (BP Location: Left arm, Patient Position: Sitting, Cuff Size: Adult Large)   Pulse 72   Wt 101.6 kg (224 lb)   SpO2 93%   BMI 32.14 kg/m      General Appearance:    Alert, cooperative, no distress, appears stated age   Head:    Normocephalic, without obvious abnormality, atraumatic   Throat:   Lips, mucosa, and tongue normal; teeth and gums normal   Neck:   Supple, symmetrical, trachea midline, no adenopathy;        thyroid:  No enlargement/tenderness/nodules; no carotid    bruit or JVD   Back:     Symmetric, no curvature, ROM normal, no CVA tenderness   Lungs:     Clear to auscultation bilaterally, respirations unlabored   Chest wall:    No tenderness or deformity   Heart:    Regular rate and rhythm, S1 and S2 normal, 2-3/6 systolic ejection murmur, crescendo decrescendo, early peaking, no rub   or gallop   Abdomen:     Soft, non-tender, bowel sounds active all four quadrants,     no masses, no organomegaly   Extremities:   Normal, atraumatic, no cyanosis or edema   Pulses:   2+ and symmetric all extremities   Skin:   Skin color, texture, turgor normal, no rashes or lesions     Results    Lab Results personally reviewed   Lab Results   Component Value Date    CHOL 202 (H) 04/05/2021    CHOL 251 (H) 06/25/2020     Lab Results   Component Value Date    HDL 29 (L) 04/05/2021    HDL 33 (L) 06/25/2020     No components found for: \"LDLCALC\"  Lab Results   Component Value Date    TRIG 207 (H) 04/05/2021    TRIG 293 (H) 06/25/2020 "     Lab Results   Component Value Date    WBC 9.9 02/26/2024    HGB 9.1 (L) 02/26/2024    HCT 29.8 (L) 02/26/2024     (H) 02/26/2024     Lab Results   Component Value Date    BUN 20.2 03/29/2024     03/29/2024    CO2 31 (H) 03/29/2024               Thank you for allowing me to participate in the care of your patient.      Sincerely,     VERONIKA HOPE MD     [facility]  cc:   Shabnam Hope MD  [address], [address]  [place] [address]

## 2024-08-07 NOTE — PROGRESS NOTES
[facility]  Heart Care Clinic Follow-up Note    Assessment & Plan          (I25.10,  I25.83) Coronary artery disease due to lipid rich plaque  (primary encounter diagnosis)  Comment: In anticipation of TAVR he underwent diagnostic angiography showing 20 to 30% left main stenosis, proximal LAD 70% stenosis with first diagonal 90% stenosis.  Circumflex has a proximal 60% followed by 70% stenosis with first obtuse marginal artery 50 to 60% proximal and second obtuse marginal mild disease.  Right coronary artery has a proximal 60% followed by diffuse 50 to 60% mid distal as well as PDA 60% stenosis.  This prompted coronary artery bypass grafting.     (Z95.1) S/P CABG (coronary artery bypass graft)  Comment: February 14, 2024 he had a vein graft to the LAD, vein graft obtuse marginal artery, and vein graft to right coronary artery without aortic valve replacement.  Getting along well.    (I50.31) Acute diastolic congestive heart failure (H)  Comment: Does have some bipedal edema and exam shows JVD.  Concerned he is up about 5 to 10 pounds of water.  Given this took furosemide 40 mg twice daily, doing well, no significant signs or symptoms of heart failure, decrease furosemide to 1 a day, only 40 mg a morning.    (I48.0) Paroxysmal atrial fibrillation (H)  Comment: This was postoperative.  Amiodarone has been discontinued.  He is on Eliquis and currently off Eliquis due to no recurrence of atrial fibrillation.    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      (I10)  Primary hypertension  Comment: Under good control currently.     (E78.00) Pure hypercholesterolemia  Comment: Total cholesterol 112 an LDL of 52 on rosuvastatin which is excellent.     (E66.09,  Z68.32) Class 1 obesity due to excess calories without serious comorbidity with body mass index (BMI) of 32.0 to 32.9 in adult  Comment: Continue to work on weight loss.    Plan  1.  Decrease furosemide to only once a day.  2.  Recheck echo in December.  This will be 6 months out from prior echo.  3.  Follow-up with me a year out from surgery which will be February 2025.  4.  If he should have significant heart failure or angina or syncope he knows to come to medical attention.    The longitudinal plan of care for the diagnosis(es)/condition(s) as documented were addressed during this visit. Due to the added complexity in care, I will continue to support Hai in the subsequent management and with ongoing continuity of care.     Subjective  CC: 78-year-old white gentleman here for a follow-up visit, he is living independently with his wife, he is here today with a granddaughter who is now pregnant with his first great grandson.  He is living independently physically active, getting along well without any significant chest pains, palpitations, shortness of breath, PND, therapy, syncope, dizziness or peripheral edema.    Medications  Current Outpatient Medications   Medication Sig Dispense Refill    acetaminophen (TYLENOL) 325 MG tablet Take 2 tablets (650 mg) by mouth every 4 hours as needed for other (For optimal non-opioid multimodal pain management to improve pain control.)      aspirin 81 MG EC tablet Take 81 mg by mouth daily      furosemide (LASIX) 40 MG tablet Take 1 tablet (40 mg) by mouth 2 times daily 180 tablet 4    Metoprolol Tartrate 37.5 MG TABS Take 37.5 mg by mouth 2 times daily for 30 days 60 tablet 0    multivitamin w/minerals (THERA-VIT-M) tablet Take 1 tablet by mouth daily      nitroGLYcerin (NITROSTAT) 0.4  "MG sublingual tablet See Admin Instructions      pantoprazole (PROTONIX) 40 MG EC tablet Take 40 mg by mouth daily before breakfast      rosuvastatin (CRESTOR) 40 MG tablet Take 1 tablet (40 mg) by mouth at bedtime for 30 days 30 tablet 0       Objective  /60 (BP Location: Left arm, Patient Position: Sitting, Cuff Size: Adult Large)   Pulse 72   Wt 101.6 kg (224 lb)   SpO2 93%   BMI 32.14 kg/m      General Appearance:    Alert, cooperative, no distress, appears stated age   Head:    Normocephalic, without obvious abnormality, atraumatic   Throat:   Lips, mucosa, and tongue normal; teeth and gums normal   Neck:   Supple, symmetrical, trachea midline, no adenopathy;        thyroid:  No enlargement/tenderness/nodules; no carotid    bruit or JVD   Back:     Symmetric, no curvature, ROM normal, no CVA tenderness   Lungs:     Clear to auscultation bilaterally, respirations unlabored   Chest wall:    No tenderness or deformity   Heart:    Regular rate and rhythm, S1 and S2 normal, 2-3/6 systolic ejection murmur, crescendo decrescendo, early peaking, no rub   or gallop   Abdomen:     Soft, non-tender, bowel sounds active all four quadrants,     no masses, no organomegaly   Extremities:   Normal, atraumatic, no cyanosis or edema   Pulses:   2+ and symmetric all extremities   Skin:   Skin color, texture, turgor normal, no rashes or lesions     Results    Lab Results personally reviewed   Lab Results   Component Value Date    CHOL 202 (H) 04/05/2021    CHOL 251 (H) 06/25/2020     Lab Results   Component Value Date    HDL 29 (L) 04/05/2021    HDL 33 (L) 06/25/2020     No components found for: \"LDLCALC\"  Lab Results   Component Value Date    TRIG 207 (H) 04/05/2021    TRIG 293 (H) 06/25/2020     Lab Results   Component Value Date    WBC 9.9 02/26/2024    HGB 9.1 (L) 02/26/2024    HCT 29.8 (L) 02/26/2024     (H) 02/26/2024     Lab Results   Component Value Date    BUN 20.2 03/29/2024     03/29/2024    CO2 " 31 (H) 03/29/2024

## 2024-08-07 NOTE — PATIENT INSTRUCTIONS
Mr Nick Zaldivar,  I enjoyed visiting with you again today.  I am glad to hear you are doing well.  Per our conversation take the FUROSEMIDE only 1 a day in AM.  We will get the echo around December.  I will plan on seeing you February.  Jas Hope

## 2024-10-01 RX ORDER — PANTOPRAZOLE SODIUM 40 MG/1
TABLET, DELAYED RELEASE ORAL
Qty: 60 TABLET | OUTPATIENT
Start: 2024-10-01

## 2024-12-09 ENCOUNTER — HOSPITAL ENCOUNTER (OUTPATIENT)
Dept: CARDIOLOGY | Facility: HOSPITAL | Age: 79
Discharge: HOME | End: 2024-12-09
Attending: INTERNAL MEDICINE | Admitting: INTERNAL MEDICINE
Payer: MEDICARE

## 2024-12-09 DIAGNOSIS — I06.0 RHEUMATIC AORTIC STENOSIS: ICD-10-CM

## 2024-12-09 LAB — LVEF ECHO: NORMAL

## 2024-12-09 PROCEDURE — 93306 TTE W/DOPPLER COMPLETE: CPT | Mod: 26 | Performed by: INTERNAL MEDICINE

## 2024-12-09 PROCEDURE — C8929 TTE W OR WO FOL WCON,DOPPLER: HCPCS

## 2024-12-09 PROCEDURE — 255N000002 HC RX 255 OP 636: Performed by: INTERNAL MEDICINE

## 2024-12-09 RX ADMIN — PERFLUTREN 2 ML: 6.52 INJECTION, SUSPENSION INTRAVENOUS at 13:59

## 2025-02-07 PROBLEM — G47.33 OSA (OBSTRUCTIVE SLEEP APNEA): Status: ACTIVE | Noted: 2025-02-07

## 2025-06-09 ENCOUNTER — HOSPITAL ENCOUNTER (OUTPATIENT)
Dept: CARDIOLOGY | Facility: CLINIC | Age: 80
Discharge: HOME | End: 2025-06-09
Attending: INTERNAL MEDICINE | Admitting: INTERNAL MEDICINE
Payer: MEDICARE

## 2025-06-09 DIAGNOSIS — I06.0 RHEUMATIC AORTIC STENOSIS: ICD-10-CM

## 2025-06-09 LAB — LVEF ECHO: NORMAL

## 2025-06-09 PROCEDURE — 93306 TTE W/DOPPLER COMPLETE: CPT

## 2025-06-09 PROCEDURE — 93306 TTE W/DOPPLER COMPLETE: CPT | Mod: 26 | Performed by: INTERNAL MEDICINE

## 2025-06-10 ENCOUNTER — TELEPHONE (OUTPATIENT)
Dept: CARDIOLOGY | Facility: CLINIC | Age: 80
End: 2025-06-10
Payer: MEDICARE

## 2025-06-10 ENCOUNTER — RESULTS FOLLOW-UP (OUTPATIENT)
Dept: CARDIOLOGY | Facility: CLINIC | Age: 80
End: 2025-06-10

## 2025-06-10 NOTE — TELEPHONE ENCOUNTER
===View-only below this line===  ----- Message -----  From: Shabnam Hope MD  Sent: 6/9/2025   5:03 PM CDT  To: [facility] Cv Rn Team Z; [facility] Valve Clinic [facility]    Given the fact that the aortic stenosis appears to be severe can we please have him evaluated by the structural heart team, would like to discuss TAVR with the next year.  LF

## 2025-06-10 NOTE — TELEPHONE ENCOUNTER
Echo imaging per report suggestive of severe aortic stenosis. Metrics reviewed. Will hold off on pre-imaging based on these. Schedule in valve clinic.     Dionne Greene RN on 6/10/2025 at 8:35 AM

## 2025-06-11 NOTE — TELEPHONE ENCOUNTER
[facility] Call Center    Phone Message    May a detailed message be left on voicemail: yes     Reason for Call: Other: Please call daughter Carlotta back to schedule TAVR appt for her father. Thank you     Action Taken: Other: cardiology    Travel Screening: Not Applicable   Thank you!  [facility]      Date of Service:

## 2025-06-12 NOTE — TELEPHONE ENCOUNTER
===View-only below this line===  ----- Message -----  From: Zayra Jauregui  Sent: 6/12/2025   3:24 PM CDT  To: Dionne Greene RN    7/25

## 2025-07-14 ENCOUNTER — DOCUMENTATION ONLY (OUTPATIENT)
Dept: CARDIOLOGY | Facility: CLINIC | Age: 80
End: 2025-07-14
Payer: MEDICARE

## 2025-07-14 DIAGNOSIS — I06.0 RHEUMATIC AORTIC STENOSIS: Primary | ICD-10-CM

## 2025-07-14 NOTE — PROGRESS NOTES
"Valve Clinic Prep Worksheet    Patient Name: Nick Zaldivar  : 1945  AGE: 79 year old     Patient Address: [address] [place] [address] BMI: 33.9   Height (CM):    Ht Readings from Last 1 Encounters:   24 1.778 m (5' 10\")                                                      Weight (kg):   Wt Readings from Last 1 Encounters:   25 109.3 kg (241 lb)                                                          Contact info/Phone number:  [phone]  Initial STS: 3.17%                                           Referred by: Dr. Hope   Date: 6/10/25  Insurance:Payor: MEDICARE / Plan: MEDICARE / Product Type: Medicare /    Patient Care Team: Patient Care Team:  [facility], [facility] as PCP - Shabnam Simon MD as Assigned Heart and Vascular Provider  Carmelina Flanagan MD as Assigned Heart and Vascular Surgical Provider    Past Medical History   Problem List:  2025: PALAK (obstructive sleep apnea)  2024: Paroxysmal atrial fibrillation (H)  2024: S/P CABG (coronary artery bypass graft)  2024: Angina pectoris  2024: CARRILLO (dyspnea on exertion)  2024: Status post coronary angiogram  2024: Other ill-defined heart diseases  2022: Benign paroxysmal positional vertigo due to bilateral   vestibular disorder  2022: Stage 3a chronic kidney disease (H)  2022: Hyperkalemia  2021: Coronary artery disease due to lipid rich plaque  2021: Primary hypertension  2021: Pure hypercholesterolemia  2021: Rheumatic aortic stenosis  2021: Obesity     Transthoracic Echocardiogram    Date (25): M mmHg Peak V: 2.69 m/sec CURT: 1.8 cm  DI: 0.47 SVI: 45 ml/m  EF: 60-65 %  Ao max P.9 mmHg        Comment on valves: mitral valve has no stenosis or regurgitation. Tricuspid valve has mild regurgitation. Severe aortic valve calcification is present, mild regurgitation and mild to moderate stenosis. 2D images of the valve are suggestive of severe stenosis.  "   Heart Cath Summary CTA (TAVR) VASU   Cardiac Catheterization  Result Date: 2024  CARDIAC CATHETERIZATION AND INTERVENTION REPORT    PATIENT NAME:  Nick Zaldivar          MEDICAL RECORD NUMBER: [id number]  : 1945       PROCEDURE DATE: 2024        CONCLUSIONS:   Multivessel coronary artery disease involving mid LAD, diagonal branch,   mid to distal left circumflex, and diffuse disease of the dominant, very   large right coronary artery.  Severely elevated right and left-sided filling pressures.  Mild pulmonary hypertension, primarily postcapillary.  Normal cardiac output and index.      RECOMMENDATIONS:   Usual postprocedure cares, please see orders  Recommend cardiothoracic surgery consult  for evaluation of surgical   revascularization and aortic valve replacement, although unclear if a   candidate given poor distal targets.  Alternatively, can consider medical   therapy versus percutaneous intervention (suboptimal candidate).  Will   discuss Mr. Zaldivar's case at our next multidisciplinary valve meeting.  Optimization of medical therapy, will increase Lasix dose (with follow up   BMP) and initiate Imdur.  Aggressive risk factor modification for secondary prevention.    PROCEDURE PERFORMED:   Selective right and left coronary angiography  Right heart catheterization    PRE-OP DIAGNOSIS:  Angina  Heart failure with preserved ejection fraction  Moderate aortic valve stenosis    POST-OP DIAGNOSIS: Same     PROCEDURALISTS: Harman Ring MD      DIAGNOSTIC PROCEDURAL DETAILS:   Signed, informed consent was obtained. The patient was placed on the table   and prepped and draped in the usual fashion. Time out and site   verification performed.   Access: Right radial artery and right brachial vein  Catheters: JL 3.5, JR4, Huntington-Silvino  Hemostasis: TR band for right radial artery, manual compression for right   brachial vein    PROCEDURAL MEDICATIONS:  Conscious sedation - midazolam and fentanyl,  please see procedure log for   dosing.   Local anesthesia - 1% lidocaine   Procedural anticoagulation - 75 units/kg of heparin     CONTRAST VOLUME: 60 mL Visipaque  BLOOD LOSS: minimal   FLUIDS: None   SPECIMENS: None  COMPLICATIONS: None    FINDINGS:    Right Heart Catheterization         RA: 15 mmHg         RV: 45/12/19 mmHg (s/d/ed)         PA: 45/22/31 mmHg (s/d/m)         PCWP: 20 mmHg          CO 5.3 L/min (thermodilution)  CI:  2.38 L/min/m2             Coronary Angiography:  LEFT MAIN: Normal caliber vessel that trifurcates into the left anterior   descending, ramus intermedius, and left circumflex arteries.  The left   main has 20 to 30% stenosis.  LEFT ANTERIOR DESCENDING: Normal caliber vessel that gives rise to large   diagonal branch and multiple septal perforators.  The LAD tapers into a   small vessel distally.  The proximal LAD has moderate diffuse disease   followed by 70% stenosis in the midportion.  Distally, the LAD has   moderate diffuse disease and tapers into a small vessel after giving rise   to a small diagonal 2 branch near the apex.  The large diagonal branch has   a 90% stenosis in the proximal to midportion with chronic total occlusion   of the distal branch and moderate to severe diffuse disease of the   proximal branch.  LEFT CIRCUMFLEX: Nondominant vessel that gives rise to a large OM1 branch,   and terminates into a large OM 2 branch distally.  The proximal left   circumflex has a 60% diffuse disease followed by 70% stenosis in the   distal portion prior to the OM 2 branch.  The OM1 branch has a 50 to 60%   proximal stenosis followed by mild diffuse disease distally.  The OM 2   branch has mild diffuse disease.  RIGHT CORONARY ARTERY: Very large dominant vessel that gives rise to a   very large right posterior descending artery that effectively supplies the   distal apex and posterolateral system.  The proximal right coronary artery   has a 60% calcific stenosis followed by diffuse  50 to 70% stenosis in the   mid to distal portion.  The PDA has mild diffuse disease in the proximal   portion followed by diffuse 60% stenosis in the mid to distal portion.    Posterolateral system has mild diffuse disease.  RAMUS INTERMEDIUS: Small vessel with mild to moderate diffuse disease.      Please do not hesitate to contact me if you have any questions or   concerns.  I was present for the procedure in its entirety.     Harman Ring MD  Interventional Cardiology       not ordered  Not ordered    Labs   Creat:   Creatinine   Date Value Ref Range Status   03/29/2024 1.08 0.67 - 1.17 mg/dL Final       GFR:   GFR Estimate   Date Value Ref Range Status   03/29/2024 70 >60 mL/min/1.73m2 Final   03/25/2024 81 >60 mL/min/1.73m2 Final   02/26/2024 70 >60 mL/min/1.73m2 Final   04/05/2021 >60 >60 mL/min/1.73m2 Final   06/25/2020 >60 >60 mL/min/1.73m2 Final   03/26/2020 >60 >60 mL/min/1.73m2 Final     GFR Estimate If Black   Date Value Ref Range Status   04/05/2021 >60 >60 mL/min/1.73m2 Final   06/25/2020 >60 >60 mL/min/1.73m2 Final   03/26/2020 >60 >60 mL/min/1.73m2 Final       Potassium:   Potassium   Date Value Ref Range Status   03/29/2024 4.3 3.4 - 5.3 mmol/L Final   02/22/2022 4.8 3.5 - 5.0 mmol/L Final     Potassium POCT   Date Value Ref Range Status   02/14/2024 4.1 3.4 - 5.3 mmol/L Final       Sodium:   Sodium   Date Value Ref Range Status   03/29/2024 142 135 - 145 mmol/L Final     Comment:     Reference intervals for this test were updated on 09/26/2023 to more accurately reflect our healthy population. There may be differences in the flagging of prior results with similar values performed with this method. Interpretation of those prior results can be made in the context of the updated reference intervals.        WBC:   WBC Count   Date Value Ref Range Status   02/26/2024 9.9 4.0 - 11.0 10e3/uL Final       Hgb:   Hemoglobin   Date Value Ref Range Status   02/26/2024 9.1 (L) 13.3 - 17.7 g/dL Final    02/20/2024 9.0 (L) 13.3 - 17.7 g/dL Final       HCT:   Hematocrit   Date Value Ref Range Status   02/26/2024 29.8 (L) 40.0 - 53.0 % Final       Plts:   Platelet Count   Date Value Ref Range Status   02/26/2024 478 (H) 150 - 450 10e3/uL Final       Albumin:   Lab Results   Component Value Date    ALBUMIN 3.5 02/15/2024    ALBUMIN 3.9 04/05/2021       INR:   INR   Date Value Ref Range Status   02/14/2024 1.43 (H) 0.85 - 1.15 Final        Current Medications: Allergies:    Current Outpatient Medications   Medication Sig Dispense Refill    acetaminophen (TYLENOL) 325 MG tablet Take 2 tablets (650 mg) by mouth every 4 hours as needed for other (For optimal non-opioid multimodal pain management to improve pain control.)      aspirin 81 MG EC tablet Take 81 mg by mouth daily      furosemide (LASIX) 40 MG tablet Take 1 tablet (40 mg) by mouth daily.      metoprolol tartrate (LOPRESSOR) 25 MG tablet Take 1 tablet (25 mg) by mouth 3 times daily.      multivitamin w/minerals (THERA-VIT-M) tablet Take 1 tablet by mouth daily      nitroGLYcerin (NITROSTAT) 0.4 MG sublingual tablet See Admin Instructions      pantoprazole (PROTONIX) 40 MG EC tablet Take 40 mg by mouth daily before breakfast      rosuvastatin (CRESTOR) 40 MG tablet Take 1 tablet (40 mg) by mouth at bedtime for 30 days 30 tablet 0     No current facility-administered medications for this visit.        Allergies   Allergen Reactions    Bee Venom      Other Reaction(s): Edema

## 2025-07-25 ENCOUNTER — LAB (OUTPATIENT)
Dept: CARDIOLOGY | Facility: CLINIC | Age: 80
End: 2025-07-25
Payer: MEDICARE

## 2025-07-25 DIAGNOSIS — I06.0 RHEUMATIC AORTIC STENOSIS: ICD-10-CM

## 2025-07-25 LAB
ATRIAL RATE - MUSE: 66 BPM
DIASTOLIC BLOOD PRESSURE - MUSE: NORMAL MMHG
INTERPRETATION ECG - MUSE: NORMAL
P AXIS - MUSE: 14 DEGREES
PR INTERVAL - MUSE: 210 MS
QRS DURATION - MUSE: 82 MS
QT - MUSE: 406 MS
QTC - MUSE: 425 MS
R AXIS - MUSE: 12 DEGREES
SYSTOLIC BLOOD PRESSURE - MUSE: NORMAL MMHG
T AXIS - MUSE: 37 DEGREES
VENTRICULAR RATE- MUSE: 66 BPM

## 2025-07-25 PROCEDURE — 93000 ELECTROCARDIOGRAM COMPLETE: CPT | Performed by: INTERNAL MEDICINE

## 2025-08-05 ENCOUNTER — HOSPITAL ENCOUNTER (OUTPATIENT)
Dept: CT IMAGING | Facility: CLINIC | Age: 80
Discharge: HOME | End: 2025-08-05
Attending: INTERNAL MEDICINE
Payer: MEDICARE

## 2025-08-05 DIAGNOSIS — I35.0 AORTIC STENOSIS: ICD-10-CM

## 2025-08-05 LAB
CREAT BLD-MCNC: 1 MG/DL (ref 0.7–1.2)
EGFRCR SERPLBLD CKD-EPI 2021: >60 ML/MIN/1.73M2

## 2025-08-05 PROCEDURE — 75572 CT HRT W/3D IMAGE: CPT | Mod: 26 | Performed by: INTERNAL MEDICINE

## 2025-08-05 PROCEDURE — 71275 CT ANGIOGRAPHY CHEST: CPT | Mod: 26 | Performed by: INTERNAL MEDICINE

## 2025-08-05 PROCEDURE — 71275 CT ANGIOGRAPHY CHEST: CPT

## 2025-08-05 PROCEDURE — 74174 CTA ABD&PLVS W/CONTRAST: CPT | Mod: 26 | Performed by: INTERNAL MEDICINE

## 2025-08-05 PROCEDURE — 82565 ASSAY OF CREATININE: CPT

## 2025-08-05 PROCEDURE — 250N000011 HC RX IP 250 OP 636: Performed by: INTERNAL MEDICINE

## 2025-08-05 RX ORDER — IOPAMIDOL 755 MG/ML
100 INJECTION, SOLUTION INTRAVASCULAR ONCE
Status: COMPLETED | OUTPATIENT
Start: 2025-08-05 | End: 2025-08-05

## 2025-08-05 RX ADMIN — IOPAMIDOL 100 ML: 755 INJECTION, SOLUTION INTRAVENOUS at 12:50

## 2025-08-07 ENCOUNTER — TELEPHONE (OUTPATIENT)
Dept: CARDIOLOGY | Facility: CLINIC | Age: 80
End: 2025-08-07
Payer: MEDICARE

## 2025-08-07 DIAGNOSIS — I35.0 AORTIC STENOSIS: Primary | ICD-10-CM

## 2025-08-10 ENCOUNTER — HEALTH MAINTENANCE LETTER (OUTPATIENT)
Age: 80
End: 2025-08-10

## (undated) DEVICE — SOL WATER IRRIG 1000ML BOTTLE 2F7114

## (undated) DEVICE — SU PROLENE 4-0 RB-1DA 36" 8557H

## (undated) DEVICE — CATH ANGIO WEDGE PRESSURE 5FRX110CM DL AI-07124

## (undated) DEVICE — SUCTION CANISTER MEDIVAC LINER 3000ML W/LID 65651-530

## (undated) DEVICE — GOWN IMPERVIOUS BREATHABLE SMART LG 89015

## (undated) DEVICE — SU STRATAFIX PDS PLUS 0 CT 45CM SXPP1A406

## (undated) DEVICE — KIT HAND CONTROL ACIST 014644 AR-P54

## (undated) DEVICE — SU DEVICE COR-KNOT MINI 031300

## (undated) DEVICE — CATH ANGIO 6FR JL3.5 100CM ST+

## (undated) DEVICE — CABLE MYO/LEAD PACING BLUE DISP 019-535

## (undated) DEVICE — SOL NACL 0.9% IRRIG 1000ML BOTTLE 2F7124

## (undated) DEVICE — CUSTOM PACK CAB SCV5BCBHEA

## (undated) DEVICE — LIGACLIP LARGE AESCULAP O4120-1

## (undated) DEVICE — CANNULA VESSEL DLP  30001

## (undated) DEVICE — Device

## (undated) DEVICE — SU 30IN EBND EXC SNTH 2-0 V-5 2 ARM BRD FIRM PLDGT PXX52N

## (undated) DEVICE — SU STRATAFIX MONOCRYL 4-0 SPIRAL PS-2 SXMP1B118

## (undated) DEVICE — STPL SKIN PROXIMATE 35 WIDE PMW35

## (undated) DEVICE — CUSTOM PACK CORONARY SAN5BCRHEA

## (undated) DEVICE — CONNECTOR BLAKE DRAIN SGL BCC1

## (undated) DEVICE — SUCTION TIP YANKAUER W/O VENT K86

## (undated) DEVICE — GUIDEWIRE FORTE FLOPPY J TOP 34949-05J

## (undated) DEVICE — CATH THORACIC STRAIGHT CLOTSTOP 36FR 100036

## (undated) DEVICE — SU ETHIBOND 3-0 BBDA 36" X588H

## (undated) DEVICE — SU STERNAL WIRE SINGLE #6 046-032

## (undated) DEVICE — CABLE MYO/LEAD PACING WHITE DISP 019-530

## (undated) DEVICE — CLIP HORIZON MULTI SM YELLOW 001204

## (undated) DEVICE — SU MYOSTERNAL WIRE  046-267

## (undated) DEVICE — SHTH INTRO 0.021IN ID 6FR DIA

## (undated) DEVICE — SU DEVICE ENDO COR KNOT QUICK LOAD RELOAD 030874

## (undated) DEVICE — CATH ANGIO JUDKINS R4 6FRX100CM INFINITI 534621T

## (undated) DEVICE — ELECTRODE DEFIB CADENCE 22550R

## (undated) DEVICE — ESU GROUND PAD ADULT REM W/15' CORD E7507DB

## (undated) DEVICE — SU DERMABOND ADVANCED .7ML DNX12

## (undated) DEVICE — CATH THORACIC RT ANGLE CLOTSTOP 28FR

## (undated) DEVICE — PITCHER STERILE 1000ML  SSK9004A

## (undated) DEVICE — SLEEVE TR BAND RADIAL COMPRESSION DEVICE 29CM XX-RF06L

## (undated) DEVICE — PACK MINOR SINGLE BASIN SSK3001

## (undated) DEVICE — ESU ELEC BLADE 2.75" COATED/INSULATED E1455

## (undated) DEVICE — LEAD PACER MYOCARDIAL BIPOLAR TEMPORARY 53CM 6495F

## (undated) DEVICE — SU STRATAFIX PDS PLUS 2-0 SPIRAL CT-1 30CM SXPP1B410

## (undated) DEVICE — HEMOSTASIS BONE OSTENE 2.5G SYNTHETIC 1503832

## (undated) DEVICE — SPONGE LAP 18X18" X8435

## (undated) DEVICE — SU PLEDGET SOFT TFE 3/8"X3/26"X1/16" PCP40

## (undated) DEVICE — NEEDLE SUTURE ANCHOR 1861-3DG

## (undated) DEVICE — CUSTOM PACK CV ST JOES SCV5BCVHEA

## (undated) DEVICE — DRSG DRAIN 4X4" 7086

## (undated) DEVICE — RX SURGIFLO HEMOSTATIC MATRIX W/THROMBIN 8ML 2994

## (undated) DEVICE — CLIP HORIZON MULTI MED BLUE 002204

## (undated) DEVICE — KIT ENDO VASOVIEW HARVESTING SYSTEM VH-3200

## (undated) DEVICE — GLOVE GAMMEX NEOPRENE ULTRA SZ 7 LF 8514

## (undated) DEVICE — BLADE KNIFE SURG 15 371115

## (undated) DEVICE — CELL SAVER

## (undated) DEVICE — COVER ULTRASOUND PROBE STRL 9001C0197

## (undated) DEVICE — SPONGE KITNER DISSECTING 7102*

## (undated) DEVICE — SUTURE MONOCRYL+ 4-0 PS-2 27IN MCP426H

## (undated) DEVICE — SU PROLENE 6-0 C-1DA 30" 8706H

## (undated) DEVICE — MANIFOLD KIT ANGIO AUTOMATED 014613

## (undated) DEVICE — SU PROLENE 7-0 BV175-6 24' M8737

## (undated) DEVICE — GUIDEWIRE VASC COUGAR LT 0.014IN 190CM 3CM TIP CGRLS190HS

## (undated) DEVICE — BLANKET BAIR ADLT PLYMR 60X36IN 63000

## (undated) DEVICE — NDL 30GA 1" 305128

## (undated) DEVICE — SUCTION DRY CHEST DRAIN OASIS 3600-100

## (undated) DEVICE — CATH SUCTION 14FR W/O CTRL DYND41962

## (undated) DEVICE — SYR ANGIOGRAPHY MULTIUSE KIT ACIST 014612

## (undated) DEVICE — GLOVE LINER COTTON MED 32-2076

## (undated) DEVICE — RX VISTASEAL FIBRIN SEALANT W/THROMBIN 10ML VST10

## (undated) DEVICE — PREP CHLORAPREP 26ML TINTED HI-LITE ORANGE 930815

## (undated) RX ORDER — FENTANYL CITRATE-0.9 % NACL/PF 10 MCG/ML
PLASTIC BAG, INJECTION (ML) INTRAVENOUS
Status: DISPENSED
Start: 2024-02-14

## (undated) RX ORDER — PROPOFOL 10 MG/ML
INJECTION, EMULSION INTRAVENOUS
Status: DISPENSED
Start: 2024-02-14

## (undated) RX ORDER — VASOPRESSIN 20 U/ML
INJECTION PARENTERAL
Status: DISPENSED
Start: 2024-02-14

## (undated) RX ORDER — FENTANYL CITRATE 50 UG/ML
INJECTION, SOLUTION INTRAMUSCULAR; INTRAVENOUS
Status: DISPENSED
Start: 2024-01-22

## (undated) RX ORDER — FENTANYL CITRATE 50 UG/ML
INJECTION, SOLUTION INTRAMUSCULAR; INTRAVENOUS
Status: DISPENSED
Start: 2024-02-14

## (undated) RX ORDER — DIAZEPAM 5 MG
TABLET ORAL
Status: DISPENSED
Start: 2024-01-22

## (undated) RX ORDER — VANCOMYCIN HYDROCHLORIDE 1 G/20ML
INJECTION, POWDER, LYOPHILIZED, FOR SOLUTION INTRAVENOUS
Status: DISPENSED
Start: 2024-02-14